# Patient Record
Sex: MALE | Race: WHITE | NOT HISPANIC OR LATINO | Employment: OTHER | URBAN - METROPOLITAN AREA
[De-identification: names, ages, dates, MRNs, and addresses within clinical notes are randomized per-mention and may not be internally consistent; named-entity substitution may affect disease eponyms.]

---

## 2017-04-28 ENCOUNTER — TELEPHONE (OUTPATIENT)
Dept: FAMILY MEDICINE | Facility: CLINIC | Age: 67
End: 2017-04-28

## 2017-04-28 NOTE — TELEPHONE ENCOUNTER
Left message that provider will be out of the clinic on day of appointment and will need to call back to reschedule appointment.

## 2017-05-19 DIAGNOSIS — E11.9 TYPE 2 DIABETES, HBA1C GOAL < 7%: Primary | ICD-10-CM

## 2017-05-22 ENCOUNTER — DOCUMENTATION ONLY (OUTPATIENT)
Dept: FAMILY MEDICINE | Facility: CLINIC | Age: 67
End: 2017-05-22

## 2017-05-22 NOTE — PROGRESS NOTES
Pre-Visit Chart Review  For Appointment Scheduled on 5/23/17.    Health Maintenance Due   Topic Date Due    TETANUS VACCINE  11/16/1966    Colonoscopy  11/16/1998    Zoster Vaccine  11/16/2008    Pneumococcal (65+) (2 of 2 - PCV13) 12/03/2014    Hemoglobin A1c  08/11/2015

## 2017-05-26 ENCOUNTER — DOCUMENTATION ONLY (OUTPATIENT)
Dept: FAMILY MEDICINE | Facility: CLINIC | Age: 67
End: 2017-05-26

## 2017-05-26 NOTE — PROGRESS NOTES
Pre-Visit Chart Review  For Appointment Scheduled on 5/31/17.    Health Maintenance Due   Topic Date Due    TETANUS VACCINE  11/16/1966    Colonoscopy  11/16/1998    Zoster Vaccine  11/16/2008    Pneumococcal (65+) (2 of 2 - PCV13) 12/03/2014    Hemoglobin A1c  08/11/2015

## 2017-05-31 ENCOUNTER — TELEPHONE (OUTPATIENT)
Dept: FAMILY MEDICINE | Facility: CLINIC | Age: 67
End: 2017-05-31

## 2017-05-31 ENCOUNTER — LAB VISIT (OUTPATIENT)
Dept: LAB | Facility: HOSPITAL | Age: 67
End: 2017-05-31
Attending: FAMILY MEDICINE
Payer: MEDICARE

## 2017-05-31 ENCOUNTER — OFFICE VISIT (OUTPATIENT)
Dept: FAMILY MEDICINE | Facility: CLINIC | Age: 67
End: 2017-05-31
Payer: MEDICARE

## 2017-05-31 VITALS
BODY MASS INDEX: 34.51 KG/M2 | SYSTOLIC BLOOD PRESSURE: 178 MMHG | TEMPERATURE: 98 F | RESPIRATION RATE: 18 BRPM | DIASTOLIC BLOOD PRESSURE: 70 MMHG | HEIGHT: 69 IN | WEIGHT: 233 LBS | HEART RATE: 60 BPM

## 2017-05-31 DIAGNOSIS — I10 HTN (HYPERTENSION), BENIGN: ICD-10-CM

## 2017-05-31 DIAGNOSIS — Z86.718 HISTORY OF DVT (DEEP VEIN THROMBOSIS): ICD-10-CM

## 2017-05-31 DIAGNOSIS — E11.9 TYPE 2 DIABETES MELLITUS WITHOUT COMPLICATION, UNSPECIFIED LONG TERM INSULIN USE STATUS: ICD-10-CM

## 2017-05-31 DIAGNOSIS — M65.30 TRIGGER FINGER, UNSPECIFIED FINGER, UNSPECIFIED LATERALITY: ICD-10-CM

## 2017-05-31 DIAGNOSIS — H61.22 LEFT EAR IMPACTED CERUMEN: ICD-10-CM

## 2017-05-31 DIAGNOSIS — I25.10 CORONARY ARTERY DISEASE INVOLVING NATIVE CORONARY ARTERY OF NATIVE HEART WITHOUT ANGINA PECTORIS: ICD-10-CM

## 2017-05-31 DIAGNOSIS — Z11.4 ENCOUNTER FOR SCREENING FOR HIV: ICD-10-CM

## 2017-05-31 DIAGNOSIS — I10 HTN (HYPERTENSION), BENIGN: Primary | ICD-10-CM

## 2017-05-31 DIAGNOSIS — E78.5 HYPERLIPIDEMIA WITH TARGET LOW DENSITY LIPOPROTEIN (LDL) CHOLESTEROL LESS THAN 100 MG/DL: ICD-10-CM

## 2017-05-31 DIAGNOSIS — Z29.9 PREVENTIVE MEASURE: ICD-10-CM

## 2017-05-31 DIAGNOSIS — Z12.11 COLON CANCER SCREENING: ICD-10-CM

## 2017-05-31 DIAGNOSIS — E11.9 TYPE 2 DIABETES, HBA1C GOAL < 7%: ICD-10-CM

## 2017-05-31 DIAGNOSIS — Z23 IMMUNIZATION DUE: ICD-10-CM

## 2017-05-31 DIAGNOSIS — L57.0 ACTINIC KERATOSIS: ICD-10-CM

## 2017-05-31 LAB
25(OH)D3+25(OH)D2 SERPL-MCNC: 69 NG/ML
ALBUMIN SERPL BCP-MCNC: 3.8 G/DL
ALP SERPL-CCNC: 59 U/L
ALT SERPL W/O P-5'-P-CCNC: 32 U/L
ANION GAP SERPL CALC-SCNC: 8 MMOL/L
AST SERPL-CCNC: 24 U/L
BASOPHILS # BLD AUTO: 0.03 K/UL
BASOPHILS NFR BLD: 0.5 %
BILIRUB SERPL-MCNC: 0.4 MG/DL
BUN SERPL-MCNC: 24 MG/DL
CALCIUM SERPL-MCNC: 9.5 MG/DL
CHLORIDE SERPL-SCNC: 106 MMOL/L
CHOLEST/HDLC SERPL: 5.5 {RATIO}
CO2 SERPL-SCNC: 26 MMOL/L
CREAT SERPL-MCNC: 1.5 MG/DL
DIFFERENTIAL METHOD: ABNORMAL
EOSINOPHIL # BLD AUTO: 0.1 K/UL
EOSINOPHIL NFR BLD: 2.2 %
ERYTHROCYTE [DISTWIDTH] IN BLOOD BY AUTOMATED COUNT: 12.8 %
EST. GFR  (AFRICAN AMERICAN): 54.5 ML/MIN/1.73 M^2
EST. GFR  (NON AFRICAN AMERICAN): 47.2 ML/MIN/1.73 M^2
GLUCOSE SERPL-MCNC: 183 MG/DL
HCT VFR BLD AUTO: 36.6 %
HDL/CHOLESTEROL RATIO: 18.1 %
HDLC SERPL-MCNC: 166 MG/DL
HDLC SERPL-MCNC: 30 MG/DL
HGB BLD-MCNC: 12.4 G/DL
LDLC SERPL CALC-MCNC: 101.8 MG/DL
LYMPHOCYTES # BLD AUTO: 1.5 K/UL
LYMPHOCYTES NFR BLD: 23.4 %
MCH RBC QN AUTO: 31.2 PG
MCHC RBC AUTO-ENTMCNC: 33.9 %
MCV RBC AUTO: 92 FL
MONOCYTES # BLD AUTO: 0.4 K/UL
MONOCYTES NFR BLD: 6 %
NEUTROPHILS # BLD AUTO: 4.3 K/UL
NEUTROPHILS NFR BLD: 67.6 %
NONHDLC SERPL-MCNC: 136 MG/DL
PLATELET # BLD AUTO: 172 K/UL
PMV BLD AUTO: 10.2 FL
POTASSIUM SERPL-SCNC: 4.6 MMOL/L
PROT SERPL-MCNC: 7.1 G/DL
RBC # BLD AUTO: 3.97 M/UL
SODIUM SERPL-SCNC: 140 MMOL/L
TRIGL SERPL-MCNC: 171 MG/DL
WBC # BLD AUTO: 6.36 K/UL

## 2017-05-31 PROCEDURE — 90670 PCV13 VACCINE IM: CPT | Mod: S$GLB,,, | Performed by: FAMILY MEDICINE

## 2017-05-31 PROCEDURE — 1159F MED LIST DOCD IN RCRD: CPT | Mod: S$GLB,,, | Performed by: FAMILY MEDICINE

## 2017-05-31 PROCEDURE — 99214 OFFICE O/P EST MOD 30 MIN: CPT | Mod: 25,S$GLB,, | Performed by: FAMILY MEDICINE

## 2017-05-31 PROCEDURE — G0009 ADMIN PNEUMOCOCCAL VACCINE: HCPCS | Mod: S$GLB,,, | Performed by: FAMILY MEDICINE

## 2017-05-31 PROCEDURE — 80061 LIPID PANEL: CPT

## 2017-05-31 PROCEDURE — 82306 VITAMIN D 25 HYDROXY: CPT

## 2017-05-31 PROCEDURE — 85025 COMPLETE CBC W/AUTO DIFF WBC: CPT

## 2017-05-31 PROCEDURE — 86592 SYPHILIS TEST NON-TREP QUAL: CPT

## 2017-05-31 PROCEDURE — 93005 ELECTROCARDIOGRAM TRACING: CPT | Mod: S$GLB,,, | Performed by: FAMILY MEDICINE

## 2017-05-31 PROCEDURE — 1126F AMNT PAIN NOTED NONE PRSNT: CPT | Mod: S$GLB,,, | Performed by: FAMILY MEDICINE

## 2017-05-31 PROCEDURE — 69209 REMOVE IMPACTED EAR WAX UNI: CPT | Mod: LT,S$GLB,, | Performed by: FAMILY MEDICINE

## 2017-05-31 PROCEDURE — 93010 ELECTROCARDIOGRAM REPORT: CPT | Mod: S$GLB,,, | Performed by: INTERNAL MEDICINE

## 2017-05-31 PROCEDURE — 36415 COLL VENOUS BLD VENIPUNCTURE: CPT | Mod: PO

## 2017-05-31 PROCEDURE — 4010F ACE/ARB THERAPY RXD/TAKEN: CPT | Mod: S$GLB,,, | Performed by: FAMILY MEDICINE

## 2017-05-31 PROCEDURE — 86703 HIV-1/HIV-2 1 RESULT ANTBDY: CPT

## 2017-05-31 PROCEDURE — 83036 HEMOGLOBIN GLYCOSYLATED A1C: CPT

## 2017-05-31 PROCEDURE — 80053 COMPREHEN METABOLIC PANEL: CPT

## 2017-05-31 PROCEDURE — 99999 PR PBB SHADOW E&M-EST. PATIENT-LVL III: CPT | Mod: PBBFAC,,, | Performed by: FAMILY MEDICINE

## 2017-05-31 RX ORDER — GLIPIZIDE 10 MG/1
10 TABLET ORAL 2 TIMES DAILY
Qty: 60 TABLET | Refills: 6 | Status: SHIPPED | OUTPATIENT
Start: 2017-05-31 | End: 2017-11-29 | Stop reason: SDUPTHER

## 2017-05-31 RX ORDER — PRAVASTATIN SODIUM 80 MG/1
80 TABLET ORAL DAILY
COMMUNITY
End: 2017-07-20 | Stop reason: SDUPTHER

## 2017-05-31 RX ORDER — GLIPIZIDE 10 MG/1
10 TABLET ORAL 2 TIMES DAILY
COMMUNITY
End: 2017-05-31 | Stop reason: SDUPTHER

## 2017-05-31 NOTE — PROGRESS NOTES
"[facility]  Progress Note    Subjective:       Patient ID: Amadeo Levin is a 66 y.o. male.    Chief Complaint: Establish Care and Annual Exam    HPI66 y.o.male     Mr Levin is a 65yo male with a PMH coronary artery disease, hypertension, and diabetes mellitus type 2 who presents today for establishment of care. All patients medical conditions have been under control with current medications. Patient complaining of his finger in left hand being stuck. Patient constantly uses finger to press down weed doreen. Finger will pop in and out with movement.  No further complaints at today's visit.     Review of Systems   Constitutional: Negative for activity change, appetite change, chills, fatigue and fever.   HENT: Negative for congestion, ear pain, postnasal drip, rhinorrhea, sinus pressure, sneezing and sore throat.    Eyes: Positive for redness. Negative for pain.   Respiratory: Negative for cough, chest tightness, shortness of breath and wheezing.    Cardiovascular: Negative for chest pain, palpitations and leg swelling.   Gastrointestinal: Negative for abdominal distention, abdominal pain, constipation, diarrhea, nausea and vomiting.   Genitourinary: Negative for difficulty urinating, dysuria, frequency and urgency.   Musculoskeletal: Negative for arthralgias, back pain, joint swelling and myalgias.   Skin: Negative for rash.   Neurological: Negative for dizziness, seizures, syncope, weakness and numbness.   Psychiatric/Behavioral: Negative for sleep disturbance. The patient is not nervous/anxious.        Objective:      Vitals:    05/31/17 0947 05/31/17 1035   BP: (!) 176/71 (!) 178/70   BP Location: Right arm Right arm   Patient Position: Sitting Sitting   BP Method: Automatic Manual   Pulse: 60    Resp: 18    Temp: 98.3 °F (36.8 °C)    TempSrc: Oral    Weight: 105.7 kg (233 lb 0.4 oz)    Height: 5' 9" (1.753 m)      Body mass index is 34.41 kg/m².  Physical Exam   Constitutional: He is oriented to " person, place, and time. He appears well-developed and well-nourished. No distress.   HENT:   Head: Normocephalic and atraumatic.   Left ear cerumen impaction   Eyes: Conjunctivae and EOM are normal. Pupils are equal, round, and reactive to light. No scleral icterus.   Neck: Normal range of motion. Neck supple. No JVD present.   Cardiovascular: Normal rate, regular rhythm, normal heart sounds and intact distal pulses.  Exam reveals no gallop and no friction rub.    No murmur heard.  Pulmonary/Chest: Effort normal and breath sounds normal. No respiratory distress. He has no wheezes.   Abdominal: Soft. Bowel sounds are normal. There is no tenderness. There is no guarding.   Musculoskeletal: Normal range of motion. He exhibits no tenderness.   Lymphadenopathy:     He has no cervical adenopathy.   Neurological: He is alert and oriented to person, place, and time. No cranial nerve deficit.   Skin: Skin is warm and dry. He is not diaphoretic.   Psychiatric: He has a normal mood and affect. His behavior is normal. Judgment and thought content normal.   Nursing note and vitals reviewed.      Assessment:       1. HTN (hypertension), benign    2. Coronary artery disease involving native coronary artery of native heart without angina pectoris    3. Type 2 diabetes mellitus without complication, unspecified long term insulin use status    4. Hyperlipidemia with target low density lipoprotein (LDL) cholesterol less than 100 mg/dL    5. BMI 34.0-34.9,adult    6. History of DVT (deep vein thrombosis)    7. Encounter for screening for HIV    8. Preventive measure    9. Immunization due    10. Colon cancer screening    11. Actinic keratosis    12. Trigger finger, unspecified finger, unspecified laterality    13. Left ear impacted cerumen        Plan:       HTN (hypertension), benign  -     Comprehensive metabolic panel; Future; Expected date: 05/31/2017  -     IN OFFICE EKG 12-LEAD (to [place])    Coronary artery disease involving  native coronary artery of native heart without angina pectoris  -     CBC auto differential; Future; Expected date: 05/31/2017    Type 2 diabetes mellitus without complication, unspecified long term insulin use status  -     Hemoglobin A1c; Future; Expected date: 05/31/2017  -     Microalbumin/creatinine urine ratio; Future; Expected date: 05/31/2017    Hyperlipidemia with target low density lipoprotein (LDL) cholesterol less than 100 mg/dL  -     Lipid panel; Future; Expected date: 05/31/2017    BMI 34.0-34.9,adult  -     Vitamin D; Future; Expected date: 05/31/2017    History of DVT (deep vein thrombosis)        -     EliQUIS 2.5 mg BID    Encounter for screening for HIV  -     HIV-1 and HIV-2 antibodies; Future; Expected date: 05/31/2017    Preventive measure  -     RPR; Future; Expected date: 05/31/2017    Trigger finger        - Ambulatory referral to      Left ear cerumen impaction         - Water irrigation with hydrogen peroxide was performed on left ear followed up removal of ear wax manually with curette patient tolerated procedure well without any complications       Patient readiness: acceptance and barriers:none    During the course of the visit the patient was educated and counseled about the following:     Diabetes:  Discussed general issues about diabetes pathophysiology and management.  Continued metformin; see  medication orders.  Hypertension:   Medication: no change.  Dietary sodium restriction.  Regular aerobic exercise.    Goals: Diabetes: Maintain Hemoglobin A1C below 7 and Hypertension: Reduce Blood Pressure    Did patient meet goals/outcomes: Yes    The following self management tools provided: declined    Patient Instructions (the written plan) was given to the patient/family.     Time spent with patient: 45 minutes      Return in about 3 months (around 8/31/2017).  Gurinder Turner MD  [facility]  5/31/2017 10:17 AM

## 2017-05-31 NOTE — TELEPHONE ENCOUNTER
----- Message from Makenna Quintero sent at 5/31/2017  1:25 PM CDT -----  Contact: Amadeo Julien is returning call. Please call [phone]. Thanks!

## 2017-05-31 NOTE — TELEPHONE ENCOUNTER
Staff called to see about specimen card and Derm appointment; at check out patient said he received card and would call back to schedule Derm appointment. Talked with patient that information passed along.

## 2017-05-31 NOTE — TELEPHONE ENCOUNTER
Left message for patient to call back to schedule appointment and see if he received occult cards

## 2017-06-01 LAB
ESTIMATED AVG GLUCOSE: 220 MG/DL
ESTIMATED AVG GLUCOSE: 220 MG/DL
HBA1C MFR BLD HPLC: 9.3 %
HBA1C MFR BLD HPLC: 9.3 %
HIV 1+2 AB+HIV1 P24 AG SERPL QL IA: NEGATIVE
RPR SER QL: NORMAL

## 2017-06-07 ENCOUNTER — TELEPHONE (OUTPATIENT)
Dept: FAMILY MEDICINE | Facility: CLINIC | Age: 67
End: 2017-06-07

## 2017-06-07 NOTE — TELEPHONE ENCOUNTER
----- Message from Gurinder Turner MD sent at 6/5/2017  9:41 AM CDT -----  Please call and inform patient that his HA1C has increased to 9.3.  Kidney function remains stable with CRE at 1.5. Please have patient make appointment as soon as possible to discuss treatment options.

## 2017-06-15 ENCOUNTER — DOCUMENTATION ONLY (OUTPATIENT)
Dept: FAMILY MEDICINE | Facility: CLINIC | Age: 67
End: 2017-06-15

## 2017-06-15 NOTE — PROGRESS NOTES
Pre-Visit Chart Review  For Appointment Scheduled on 06/16/2017    Health Maintenance Due   Topic Date Due    Foot Exam  11/16/1958    TETANUS VACCINE  11/16/1966    Colonoscopy  11/16/1998    Zoster Vaccine  11/16/2008    Eye Exam  06/16/2016

## 2017-06-16 ENCOUNTER — DOCUMENTATION ONLY (OUTPATIENT)
Dept: FAMILY MEDICINE | Facility: CLINIC | Age: 67
End: 2017-06-16

## 2017-06-16 ENCOUNTER — OFFICE VISIT (OUTPATIENT)
Dept: FAMILY MEDICINE | Facility: CLINIC | Age: 67
End: 2017-06-16
Payer: MEDICARE

## 2017-06-16 VITALS
BODY MASS INDEX: 33.96 KG/M2 | HEART RATE: 79 BPM | WEIGHT: 229.25 LBS | SYSTOLIC BLOOD PRESSURE: 152 MMHG | TEMPERATURE: 99 F | DIASTOLIC BLOOD PRESSURE: 68 MMHG | HEIGHT: 69 IN

## 2017-06-16 DIAGNOSIS — E11.9 TYPE 2 DIABETES MELLITUS WITHOUT COMPLICATION, UNSPECIFIED LONG TERM INSULIN USE STATUS: ICD-10-CM

## 2017-06-16 DIAGNOSIS — I10 HTN (HYPERTENSION), BENIGN: Primary | ICD-10-CM

## 2017-06-16 PROCEDURE — 4010F ACE/ARB THERAPY RXD/TAKEN: CPT | Mod: S$GLB,,, | Performed by: PHYSICIAN ASSISTANT

## 2017-06-16 PROCEDURE — 1126F AMNT PAIN NOTED NONE PRSNT: CPT | Mod: S$GLB,,, | Performed by: PHYSICIAN ASSISTANT

## 2017-06-16 PROCEDURE — 99999 PR PBB SHADOW E&M-EST. PATIENT-LVL IV: CPT | Mod: PBBFAC,,, | Performed by: PHYSICIAN ASSISTANT

## 2017-06-16 PROCEDURE — 99213 OFFICE O/P EST LOW 20 MIN: CPT | Mod: S$GLB,,, | Performed by: PHYSICIAN ASSISTANT

## 2017-06-16 PROCEDURE — 1159F MED LIST DOCD IN RCRD: CPT | Mod: S$GLB,,, | Performed by: PHYSICIAN ASSISTANT

## 2017-06-16 PROCEDURE — 3046F HEMOGLOBIN A1C LEVEL >9.0%: CPT | Mod: S$GLB,,, | Performed by: PHYSICIAN ASSISTANT

## 2017-06-16 NOTE — PROGRESS NOTES
Pre-Visit Chart Review  For Appointment Scheduled on 6/21/17.    Health Maintenance Due   Topic Date Due    Foot Exam  11/16/1958    TETANUS VACCINE  11/16/1966    Colonoscopy  11/16/1998    Zoster Vaccine  11/16/2008    Eye Exam  06/16/2016

## 2017-06-16 NOTE — PATIENT INSTRUCTIONS
Weight Management: Getting Started  Healthy bodies come in all shapes and sizes. Not all bodies are made to be thin. For some people, a healthy weight is higher than the average weight listed on weight charts. Your healthcare provider can help you decide on a healthy weight for you.    Reasons to lose weight  Losing weight can help with some health problems, such as high blood pressure, heart disease, diabetes, sleep apnea, and arthritis. You may also feel more energy.  Set your long-term goal  Your goal doesn't even have to be a specific weight. You may decide on a fitness goal (such as being able to walk 10 miles a week), or a health goal (such as lowering your blood pressure). Choose a goal that is measurable and reasonable, so you know when you've reached it. A goal of reaching a BMI of less than 25 is not always reasonable (or possible).   Make an action plan  Habits dont change overnight. Setting your goals too high can leave you feeling discouraged if you cant reach them. Be realistic. Choose one or two small changes you can make now. Set an action plan for how you are going to make these changes. When you can stick to this plan, keep making a few more small changes. Taking small steps will help you stay on the path to success.  Track your progress  Write down your goals. Then, keep a daily record of your progress. Write down what you eat and how active you are. This record lets you look back on how much youve done. It may also help when youre feeling frustrated. Reward yourself for success. Even if you dont reach every goal, give yourself credit for what you do get done.  Get support  Encouragement from others can help make losing weight easier. Ask your family members and friends for support. They may even want to join you. Also look to your healthcare provider, registered dietitian, and  for help. Your local hospital can give you more information about nutrition, exercise, and  weight loss.  Date Last Reviewed: 1/31/2016 © 2000-2016 [employer]. [address] [place] [address], [place] [address]. All rights reserved. This information is not intended as a substitute for professional medical care. Always follow your healthcare professional's instructions.        Walking for Fitness  Fitness walking has something for everyone, even people who are already fit. Walking is one of the safest ways to condition your body aerobically. It can boost energy, help you lose weight, and reduce stress.    Physical benefits  · Walking strengthens your heart and lungs, and tones your muscles.  · When walking, your feet land with less impact than in other sports. This reduces chances of muscle, bone, and joint injury.  · Regular walking improves your cholesterol levels and lowers your risk of heart disease. And it helps you control your blood sugar if you have diabetes.  · Walking is a weight-bearing activity, which helps maintain bone density. This can help prevent osteoporosis.  Personal rewards  · Taking walks can help you relax and manage stress. And fitness walking may make you feel better about yourself.  · Walking can help you sleep better at night and make you less likely to be depressed.  · Regular walking may help maintain your memory as you get older.  · Walking is a great way to spend extra time with friends and family members. Be sure to invite your dog along!  Q&A about fitness walking  Q: Will walking keep me fit?  A: Yes. Regular walking at the right pace gives you all the benefits of other aerobic activities, such as jogging and swimming.  Q: Will walking help me lose weight and keep it off?  A: Yes. Per mile, walking can burn as many calories as jogging. Your health care provider can help work walking into your weight-loss plan.  Q: Is walking safe for my health?  A: Yes. Walking is safe if you have high blood pressure, diabetes, heart disease, or other conditions. Talk to your health  care provider before you start.  Date Last Reviewed: 5/9/2015  © 4779-8952 [employer]. [address] [place] [address], [place] [address]. All rights reserved. This information is not intended as a substitute for professional medical care. Always follow your healthcare professional's instructions.

## 2017-06-16 NOTE — PROGRESS NOTES
Subjective:       Patient ID: Amadeo Levin is a 66 y.o. male.    Chief Complaint: Results (labs - 05/31)    Patient presents for follow up of recent lab indicating poor control of diabetes with A1C of 9.3.  He admits to non compliance with diet eating candy and sweets. He does not check his glucose often.  He is checking his BP and bring in monitor.  Reading are at goal of less than 140/80.  He has some diastolic readings in the 40s.  He has no complaints.  Due for foot exam.  Just had eye exam       Review of Systems   Constitutional: Negative for appetite change, fatigue and unexpected weight change.   Eyes: Negative for visual disturbance.   Respiratory: Negative for shortness of breath.    Cardiovascular: Positive for leg swelling. Negative for chest pain and palpitations.   Endocrine: Negative for polydipsia and polyuria.   Neurological: Negative for weakness and numbness.       Objective:      Physical Exam   Constitutional: He appears well-developed and well-nourished. He is cooperative. No distress.   HENT:   Head: Normocephalic and atraumatic.   Eyes: Conjunctivae and EOM are normal. No scleral icterus.   Neck: Carotid bruit is not present.   Cardiovascular: Normal rate, regular rhythm and normal heart sounds.    Pulses:       Dorsalis pedis pulses are 2+ on the right side, and 2+ on the left side.   Pulmonary/Chest: Effort normal and breath sounds normal.   Abdominal: Soft. Bowel sounds are normal. There is no tenderness.   Musculoskeletal:        Right lower leg: He exhibits edema (trace).        Left lower leg: He exhibits edema (1 + ).   Hyperpigmentation to left lower extremity      Feet:   Right Foot:   Protective Sensation: 5 sites tested. 5 sites sensed.   Skin Integrity: Negative for skin breakdown.   Left Foot:   Protective Sensation: 5 sites tested. 5 sites sensed.   Skin Integrity: Negative for skin breakdown.   Neurological: He is alert.   Vitals reviewed.      Assessment:       1. HTN  (hypertension), benign    2. Type 2 diabetes mellitus without complication, unspecified long term insulin use status    3. BMI 33.0-33.9,adult        Plan:       Amadeo was seen today for results.    Diagnoses and all orders for this visit:    HTN (hypertension), benign    Type 2 diabetes mellitus without complication, unspecified long term insulin use status    BMI 33.0-33.9,adult    Other orders  -     SITagliptin (JANUVIA) 50 MG Tab; Take 1 tablet (50 mg total) by mouth once daily.    Patient readiness: acceptance and barriers:readiness    During the course of the visit the patient was educated and counseled about the following:     Diabetes:  Labs: hemoglobin A1C.  Reminded to bring in blood sugar diary at next visit.  continue metformin and glucotrol add januvia  Hypertension:   Check blood pressures daily and record.  Obesity:   Diet interventions: diet diary indefinitely.    Goals: Diabetes: Maintain Hemoglobin A1C below 7, Hypertension: Reduce Blood Pressure and Obesity: Reduce calorie intake and BMI    Did patient meet goals/outcomes: No    The following self management tools provided: blood pressure log  blood glucose log  excercise log    Patient Instructions (the written plan) was given to the patient/family.     Time spent with patient: 30 minutes

## 2017-06-22 DIAGNOSIS — E11.9 DIABETES MELLITUS WITHOUT COMPLICATION: ICD-10-CM

## 2017-06-22 RX ORDER — METFORMIN HYDROCHLORIDE 1000 MG/1
1000 TABLET ORAL 2 TIMES DAILY WITH MEALS
Qty: 180 TABLET | Refills: 1 | Status: SHIPPED | OUTPATIENT
Start: 2017-06-22 | End: 2018-01-08 | Stop reason: SDUPTHER

## 2017-07-13 DIAGNOSIS — E11.9 DIABETES MELLITUS WITHOUT COMPLICATION: ICD-10-CM

## 2017-07-20 NOTE — TELEPHONE ENCOUNTER
----- Message from Virgen Fiore sent at 7/20/2017 11:03 AM CDT -----  Contact: patient  Patient calling in regards to requesting a new prescription for Pravastatin. He is out of refills. Please advise.  Call back   Thanks!  [facility] - [place], [place] - [address]  [address] [facility] [address] [address]  Phone: [phone] Fax: [phone]

## 2017-07-21 RX ORDER — PRAVASTATIN SODIUM 80 MG/1
80 TABLET ORAL DAILY
Qty: 90 TABLET | Refills: 3 | Status: SHIPPED | OUTPATIENT
Start: 2017-07-21 | End: 2018-11-26 | Stop reason: SDUPTHER

## 2017-09-18 ENCOUNTER — TELEPHONE (OUTPATIENT)
Dept: FAMILY MEDICINE | Facility: CLINIC | Age: 67
End: 2017-09-18

## 2017-10-10 DIAGNOSIS — Z12.11 SCREENING FOR COLON CANCER: Primary | ICD-10-CM

## 2017-11-06 ENCOUNTER — TELEPHONE (OUTPATIENT)
Dept: FAMILY MEDICINE | Facility: CLINIC | Age: 67
End: 2017-11-06

## 2017-11-06 NOTE — TELEPHONE ENCOUNTER
----- Message from Sapphire Rodriguez sent at 11/6/2017  9:21 AM CST -----  Contact: self  Pt calling to schedule 3 month labs on 11/27 before appt. Thanks    [phone]

## 2017-11-21 ENCOUNTER — TELEPHONE (OUTPATIENT)
Dept: FAMILY MEDICINE | Facility: CLINIC | Age: 67
End: 2017-11-21

## 2017-11-29 DIAGNOSIS — E11.9 TYPE 2 DIABETES MELLITUS WITHOUT COMPLICATION, UNSPECIFIED LONG TERM INSULIN USE STATUS: ICD-10-CM

## 2017-11-29 RX ORDER — GLIPIZIDE 10 MG/1
10 TABLET ORAL 2 TIMES DAILY
Qty: 180 TABLET | Refills: 3 | Status: SHIPPED | OUTPATIENT
Start: 2017-11-29 | End: 2018-01-24 | Stop reason: SDUPTHER

## 2017-11-29 NOTE — TELEPHONE ENCOUNTER
----- Message from Makenna Hendrickson sent at 11/29/2017 11:01 AM CST -----  Contact: self  Patient [phone] (please call patient on this phone number) has his 3 month followup appt with Dr Turner on Thursday 01 04 18 at 9:20am/He had an appt with Dr Turner on Monday 11 27 17 but the doctor booked out/patient is needing a 3 month refill on his Glipizide 10mg tablets - takes one tablet twice daily - #180 as he is almost out/please send to pharmacy and advise patient when sent    [facility] - [place], [place] - [address]  [address] [facility] [address]  Phone: [phone] Fax: [phone]

## 2017-12-07 DIAGNOSIS — Z13.5 DIABETIC RETINOPATHY SCREENING: ICD-10-CM

## 2017-12-29 ENCOUNTER — DOCUMENTATION ONLY (OUTPATIENT)
Dept: FAMILY MEDICINE | Facility: CLINIC | Age: 67
End: 2017-12-29

## 2017-12-29 NOTE — PROGRESS NOTES
Pre-Visit Chart Review  For Appointment Scheduled on 1/4/17.    Health Maintenance Due   Topic Date Due    TETANUS VACCINE  11/16/1966    Colonoscopy  11/16/1998    Zoster Vaccine  11/16/2008    Eye Exam  06/16/2016    Influenza Vaccine  08/01/2017    Hemoglobin A1c  08/31/2017

## 2018-01-04 ENCOUNTER — LAB VISIT (OUTPATIENT)
Dept: LAB | Facility: HOSPITAL | Age: 68
End: 2018-01-04
Attending: FAMILY MEDICINE
Payer: MEDICARE

## 2018-01-04 ENCOUNTER — OFFICE VISIT (OUTPATIENT)
Dept: FAMILY MEDICINE | Facility: CLINIC | Age: 68
End: 2018-01-04
Payer: MEDICARE

## 2018-01-04 VITALS
RESPIRATION RATE: 20 BRPM | TEMPERATURE: 98 F | HEIGHT: 69 IN | BODY MASS INDEX: 34.78 KG/M2 | WEIGHT: 234.81 LBS | SYSTOLIC BLOOD PRESSURE: 134 MMHG | DIASTOLIC BLOOD PRESSURE: 75 MMHG | HEART RATE: 69 BPM

## 2018-01-04 DIAGNOSIS — E66.9 OBESITY (BMI 30.0-34.9): ICD-10-CM

## 2018-01-04 DIAGNOSIS — L57.0 ACTINIC KERATOSIS: ICD-10-CM

## 2018-01-04 DIAGNOSIS — E11.9 TYPE 2 DIABETES MELLITUS WITHOUT COMPLICATION, UNSPECIFIED LONG TERM INSULIN USE STATUS: ICD-10-CM

## 2018-01-04 DIAGNOSIS — I10 HTN (HYPERTENSION), BENIGN: ICD-10-CM

## 2018-01-04 DIAGNOSIS — I10 HTN (HYPERTENSION), BENIGN: Primary | ICD-10-CM

## 2018-01-04 DIAGNOSIS — E78.5 HYPERLIPIDEMIA WITH TARGET LOW DENSITY LIPOPROTEIN (LDL) CHOLESTEROL LESS THAN 100 MG/DL: ICD-10-CM

## 2018-01-04 LAB
25(OH)D3+25(OH)D2 SERPL-MCNC: 69 NG/ML
ALBUMIN SERPL BCP-MCNC: 3.8 G/DL
ALP SERPL-CCNC: 61 U/L
ALT SERPL W/O P-5'-P-CCNC: 33 U/L
ANION GAP SERPL CALC-SCNC: 9 MMOL/L
AST SERPL-CCNC: 23 U/L
BASOPHILS # BLD AUTO: 0.09 K/UL
BASOPHILS NFR BLD: 1.2 %
BILIRUB SERPL-MCNC: 0.5 MG/DL
BUN SERPL-MCNC: 27 MG/DL
CALCIUM SERPL-MCNC: 9.6 MG/DL
CHLORIDE SERPL-SCNC: 106 MMOL/L
CHOLEST SERPL-MCNC: 231 MG/DL
CHOLEST/HDLC SERPL: 6.1 {RATIO}
CO2 SERPL-SCNC: 25 MMOL/L
CREAT SERPL-MCNC: 1.6 MG/DL
DIFFERENTIAL METHOD: ABNORMAL
EOSINOPHIL # BLD AUTO: 0.2 K/UL
EOSINOPHIL NFR BLD: 3.2 %
ERYTHROCYTE [DISTWIDTH] IN BLOOD BY AUTOMATED COUNT: 12.9 %
EST. GFR  (AFRICAN AMERICAN): 50.1 ML/MIN/1.73 M^2
EST. GFR  (NON AFRICAN AMERICAN): 43.3 ML/MIN/1.73 M^2
ESTIMATED AVG GLUCOSE: 212 MG/DL
GLUCOSE SERPL-MCNC: 228 MG/DL
HBA1C MFR BLD HPLC: 9 %
HCT VFR BLD AUTO: 37.2 %
HDLC SERPL-MCNC: 38 MG/DL
HDLC SERPL: 16.5 %
HGB BLD-MCNC: 12.4 G/DL
IMM GRANULOCYTES # BLD AUTO: 0.02 K/UL
IMM GRANULOCYTES NFR BLD AUTO: 0.3 %
LDLC SERPL CALC-MCNC: 146.8 MG/DL
LYMPHOCYTES # BLD AUTO: 1.8 K/UL
LYMPHOCYTES NFR BLD: 24.9 %
MCH RBC QN AUTO: 30.7 PG
MCHC RBC AUTO-ENTMCNC: 33.3 G/DL
MCV RBC AUTO: 92 FL
MONOCYTES # BLD AUTO: 0.5 K/UL
MONOCYTES NFR BLD: 7.1 %
NEUTROPHILS # BLD AUTO: 4.6 K/UL
NEUTROPHILS NFR BLD: 63.3 %
NONHDLC SERPL-MCNC: 193 MG/DL
NRBC BLD-RTO: 0 /100 WBC
PLATELET # BLD AUTO: 193 K/UL
PMV BLD AUTO: 10.8 FL
POTASSIUM SERPL-SCNC: 4.4 MMOL/L
PROT SERPL-MCNC: 7.3 G/DL
RBC # BLD AUTO: 4.04 M/UL
SODIUM SERPL-SCNC: 140 MMOL/L
TRIGL SERPL-MCNC: 231 MG/DL
TSH SERPL DL<=0.005 MIU/L-ACNC: 1.09 UIU/ML
WBC # BLD AUTO: 7.23 K/UL

## 2018-01-04 PROCEDURE — 83036 HEMOGLOBIN GLYCOSYLATED A1C: CPT

## 2018-01-04 PROCEDURE — 82306 VITAMIN D 25 HYDROXY: CPT

## 2018-01-04 PROCEDURE — 84443 ASSAY THYROID STIM HORMONE: CPT

## 2018-01-04 PROCEDURE — 36415 COLL VENOUS BLD VENIPUNCTURE: CPT | Mod: PO

## 2018-01-04 PROCEDURE — 85025 COMPLETE CBC W/AUTO DIFF WBC: CPT

## 2018-01-04 PROCEDURE — 80061 LIPID PANEL: CPT

## 2018-01-04 PROCEDURE — 99214 OFFICE O/P EST MOD 30 MIN: CPT | Mod: S$GLB,,, | Performed by: FAMILY MEDICINE

## 2018-01-04 PROCEDURE — 80053 COMPREHEN METABOLIC PANEL: CPT

## 2018-01-04 PROCEDURE — 99999 PR PBB SHADOW E&M-EST. PATIENT-LVL IV: CPT | Mod: PBBFAC,,, | Performed by: FAMILY MEDICINE

## 2018-01-04 NOTE — PROGRESS NOTES
"[facility]  Progress Note    Subjective:       Patient ID: Amadeo Levin is a 67 y.o. male.    Chief Complaint: DM follow up     HPI67 y.o.male is here today for diabetic follow-up visit.  Patient discontinued using Januvia secondary to cost.  Patient is due for annual labs today.  Patient's blood pressure is elevated at today's visit.  Patient says his blood pressures at home have been at goal below 140/80.  All other medical issues currently stable.  Review of Systems   Constitutional: Negative for chills and fever.   HENT: Negative for congestion.    Eyes: Negative for pain.   Respiratory: Negative for shortness of breath and wheezing.    Cardiovascular: Negative for chest pain, palpitations and leg swelling.   Gastrointestinal: Negative for abdominal pain, nausea and vomiting.   Genitourinary: Negative for difficulty urinating.   Musculoskeletal: Negative for arthralgias, gait problem and myalgias.   Skin: Negative for rash.   Neurological: Negative for seizures.       Objective:      Vitals:    01/04/18 0926 01/04/18 0935 01/04/18 1142   BP: (!) 178/72 (!) 182/72 134/75   BP Location: Right arm Right arm    Patient Position: Sitting Sitting    BP Method: Medium (Automatic) Medium (Manual)    Pulse: 69     Resp: 20     Temp: 97.9 °F (36.6 °C)     TempSrc: Oral     Weight: 106.5 kg (234 lb 12.6 oz)     Height: 5' 9" (1.753 m)       Body mass index is 34.67 kg/m².  Physical Exam   Constitutional: He is oriented to person, place, and time. He appears well-developed and well-nourished. No distress.   HENT:   Head: Normocephalic and atraumatic.   Cardiovascular: Normal rate, regular rhythm, normal heart sounds and intact distal pulses.  Exam reveals no gallop and no friction rub.    No murmur heard.  Pulmonary/Chest: Effort normal and breath sounds normal. No respiratory distress. He has no rales.   Abdominal: Soft. He exhibits no distension and no mass. There is no rebound and no guarding. No hernia. "   Musculoskeletal: Normal range of motion.   Neurological: He is alert and oriented to person, place, and time.   Skin: Skin is warm.   Psychiatric: He has a normal mood and affect. His behavior is normal. Judgment and thought content normal.   Vitals reviewed.      Assessment:       1. HTN (hypertension), benign    2. Hyperlipidemia with target low density lipoprotein (LDL) cholesterol less than 100 mg/dL    3. Type 2 diabetes mellitus without complication, unspecified long term insulin use status    4. Obesity (BMI 30.0-34.9)    5. Body mass index (BMI) of 33.0-33.9 in adult     6. Actinic keratosis        Plan:       HTN (hypertension), benign  -     CBC auto differential; Future; Expected date: 01/04/2018    Hyperlipidemia with target low density lipoprotein (LDL) cholesterol less than 100 mg/dL  -     Lipid panel; Future; Expected date: 01/04/2018    Type 2 diabetes mellitus without complication, unspecified long term insulin use status  -     Comprehensive metabolic panel; Future; Expected date: 01/04/2018  -     Hemoglobin A1c; Future; Expected date: 01/04/2018  -     TSH; Future; Expected date: 01/04/2018    Obesity (BMI 30.0-34.9)  -     Vitamin D; Future; Expected date: 01/04/2018    Body mass index (BMI) of 33.0-33.9 in adult   -     Vitamin D; Future; Expected date: 01/04/2018    Actinic keratosis  -     Ambulatory referral to Dermatology    Patient readiness: acceptance and barriers:none    During the course of the visit the patient was educated and counseled about the following:     Diabetes:  Discussed general issues about diabetes pathophysiology and management.  Hypertension:   Dietary sodium restriction.  Regular aerobic exercise.  Check blood pressures daily and record.  Obesity:   General weight loss/lifestyle modification strategies discussed (elicit support from others; identify saboteurs; non-food rewards, etc).    Goals: Diabetes: Maintain Hemoglobin A1C below 7, Hypertension: Reduce Blood  Pressure and Obesity: Reduce calorie intake and BMI    Did patient meet goals/outcomes: No    The following self management tools provided: blood pressure log  blood glucose log  excercise log    Patient Instructions (the written plan) was given to the patient/family.     Time spent with patient: 30 minutes    Barriers to medications present (no )    Adverse reactions to current medications (no)    Over the counter medications reviewed (Yes)    Return in about 2 weeks (around 1/18/2018).  Gurinder Turner MD  [facility]  1/4/2018 9:31 AM

## 2018-01-08 DIAGNOSIS — E11.9 DIABETES MELLITUS WITHOUT COMPLICATION: ICD-10-CM

## 2018-01-08 RX ORDER — METFORMIN HYDROCHLORIDE 1000 MG/1
TABLET ORAL
Qty: 180 TABLET | Refills: 1 | Status: SHIPPED | OUTPATIENT
Start: 2018-01-08 | End: 2018-05-21 | Stop reason: SDUPTHER

## 2018-01-22 ENCOUNTER — DOCUMENTATION ONLY (OUTPATIENT)
Dept: FAMILY MEDICINE | Facility: CLINIC | Age: 68
End: 2018-01-22

## 2018-01-22 NOTE — PROGRESS NOTES
Pre-Visit Chart Review  For Appointment Scheduled on 01/24/2018    Health Maintenance Due   Topic Date Due    TETANUS VACCINE  11/16/1966    Colonoscopy  11/16/1998    Zoster Vaccine  11/16/2008    Eye Exam  06/16/2016    Influenza Vaccine  08/01/2017

## 2018-01-24 ENCOUNTER — OFFICE VISIT (OUTPATIENT)
Dept: FAMILY MEDICINE | Facility: CLINIC | Age: 68
End: 2018-01-24
Payer: MEDICARE

## 2018-01-24 ENCOUNTER — TELEPHONE (OUTPATIENT)
Dept: ENDOCRINOLOGY | Facility: CLINIC | Age: 68
End: 2018-01-24

## 2018-01-24 VITALS
BODY MASS INDEX: 34.61 KG/M2 | HEART RATE: 67 BPM | SYSTOLIC BLOOD PRESSURE: 144 MMHG | WEIGHT: 233.69 LBS | HEIGHT: 69 IN | TEMPERATURE: 98 F | DIASTOLIC BLOOD PRESSURE: 82 MMHG

## 2018-01-24 DIAGNOSIS — E78.5 HYPERLIPIDEMIA WITH TARGET LOW DENSITY LIPOPROTEIN (LDL) CHOLESTEROL LESS THAN 100 MG/DL: ICD-10-CM

## 2018-01-24 DIAGNOSIS — E11.59 HYPERTENSION ASSOCIATED WITH DIABETES: ICD-10-CM

## 2018-01-24 DIAGNOSIS — I15.2 HYPERTENSION ASSOCIATED WITH DIABETES: ICD-10-CM

## 2018-01-24 DIAGNOSIS — E66.9 OBESITY (BMI 30.0-34.9): ICD-10-CM

## 2018-01-24 DIAGNOSIS — I25.10 CORONARY ARTERY DISEASE INVOLVING NATIVE CORONARY ARTERY OF NATIVE HEART WITHOUT ANGINA PECTORIS: ICD-10-CM

## 2018-01-24 DIAGNOSIS — Z12.11 SCREEN FOR COLON CANCER: ICD-10-CM

## 2018-01-24 PROCEDURE — 99214 OFFICE O/P EST MOD 30 MIN: CPT | Mod: S$GLB,,, | Performed by: PHYSICIAN ASSISTANT

## 2018-01-24 PROCEDURE — 99999 PR PBB SHADOW E&M-EST. PATIENT-LVL IV: CPT | Mod: PBBFAC,,, | Performed by: PHYSICIAN ASSISTANT

## 2018-01-24 RX ORDER — GLIPIZIDE 10 MG/1
TABLET ORAL
Qty: 270 TABLET | Refills: 3
Start: 2018-01-24 | End: 2018-11-26 | Stop reason: SDUPTHER

## 2018-01-24 RX ORDER — GLIPIZIDE 10 MG/1
TABLET ORAL
Qty: 270 TABLET | Refills: 3 | Status: SHIPPED | OUTPATIENT
Start: 2018-01-24 | End: 2018-01-24 | Stop reason: SDUPTHER

## 2018-01-24 NOTE — TELEPHONE ENCOUNTER
----- Message from Penelope Mooney sent at 1/24/2018 11:23 AM CST -----  Pt needs an appt for Uncontrolled type 2 diabetes mellitus with stage 3 chronic kidney disease, without long-term current use of insulin  He is willing to see a N. P.  Please call him @ [phone], okay to leave message  Thanks !

## 2018-01-24 NOTE — PROGRESS NOTES
Subjective:       Patient ID: Amadeo Levin is a 67 y.o. male.    Chief Complaint: Follow-up    Patient with uncontrolled diabetes, hypertension,hyperlipidemia presents for follow up of recent lab indicating poor diabetic control.  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  He admits to diet non compliance.  He was on januvia in the past but he is no longer able to afford the medication.  He brought in BP log showing -140/50-60.        Review of Systems   Constitutional: Negative for activity change, appetite change, fatigue and unexpected weight change.   Respiratory: Negative for cough, chest tightness and shortness of breath.    Cardiovascular: Negative for chest pain, palpitations and leg swelling.   Gastrointestinal: Negative for abdominal pain, anal bleeding, blood in stool, constipation, diarrhea and nausea.   Endocrine: Negative for polydipsia and polyuria.   Genitourinary: Negative for difficulty urinating, frequency, hematuria and urgency.   Musculoskeletal: Negative for arthralgias.   Skin: Negative for rash.   Neurological: Negative for dizziness and headaches.   Psychiatric/Behavioral: Negative for dysphoric mood. The patient is not nervous/anxious.        Objective:      Physical Exam   Constitutional: He appears well-developed and well-nourished. He is cooperative. No distress.   HENT:   Head: Normocephalic and atraumatic.   Eyes: Conjunctivae and EOM are normal. No scleral icterus.   Neck: Carotid bruit is not present.   Cardiovascular: Normal rate, regular rhythm and normal heart sounds.    Pulmonary/Chest: Effort normal and breath sounds normal.   Abdominal: Soft. Bowel sounds are normal. There is no tenderness.   Musculoskeletal:        Right lower leg: He exhibits no edema.        Left lower leg: He exhibits no edema.   Neurological: He is alert.   Vitals reviewed.      Assessment:       1. Uncontrolled type 2 diabetes mellitus with stage 3  chronic kidney disease, without long-term current use of insulin    2. Hypertension associated with diabetes    3. Body mass index (BMI) of 33.0-33.9 in adult     4. Obesity (BMI 30.0-34.9)    5. Hyperlipidemia with target low density lipoprotein (LDL) cholesterol less than 100 mg/dL    6. Coronary artery disease involving native coronary artery of native heart without angina pectoris    7. Type 2 diabetes mellitus without complication, unspecified long term insulin use status        Plan:       Amdaeo was seen today for follow-up.    Diagnoses and all orders for this visit:    Uncontrolled type 2 diabetes mellitus with stage 3 chronic kidney disease, without long-term current use of insulin  -     Ambulatory referral to Endocrinology  -     Ambulatory consult to Diabetic Education  -      -     glipiZIDE (GLUCOTROL) 10 MG tablet; 20 mg q am and 10 mg q pm    Hypertension associated with diabetes  Continue as Is   Body mass index (BMI) of 33.0-33.9 in adult     Obesity (BMI 30.0-34.9)    Hyperlipidemia with target low density lipoprotein (LDL) cholesterol less than 100 mg/dL  Increase pravastin back to 80 mg qd   Co q 10   Coronary artery disease involving native coronary artery of native heart without angina pectoris  Follow up as scheduled with cardiology   Screen for colon cancer  -     Fecal Immunochemical Test (iFOBT); Future    Patient readiness: acceptance and barriers:readiness    During the course of the visit the patient was educated and counseled about the following:     Diabetes:  Reminded to bring in blood sugar diary at next visit.  see above  Hypertension:   Medication: no change.  Obesity:   diet referral     Goals: Diabetes: Maintain Hemoglobin A1C below 7, Hypertension: Reduce Blood Pressure and Obesity: Reduce calorie intake and BMI    Did patient meet goals/outcomes: No    The following self management tools provided: blood pressure log  blood glucose log    Patient Instructions (the written  plan) was given to the patient/family.     Time spent with patient: 45 minutes    Barriers to medications present (yes )    Adverse reactions to current medications (no)    Over the counter medications reviewed (Yes)

## 2018-02-08 ENCOUNTER — PATIENT MESSAGE (OUTPATIENT)
Dept: FAMILY MEDICINE | Facility: CLINIC | Age: 68
End: 2018-02-08

## 2018-02-08 DIAGNOSIS — N18.30 STAGE 3 CHRONIC KIDNEY DISEASE: Primary | ICD-10-CM

## 2018-02-14 ENCOUNTER — TELEPHONE (OUTPATIENT)
Dept: FAMILY MEDICINE | Facility: CLINIC | Age: 68
End: 2018-02-14

## 2018-02-14 NOTE — TELEPHONE ENCOUNTER
Spoke with patient, informed of lab results and PCP/PA recommendations to follow up with nephro. Patient states he will be out of town until next month and will follow up when he returns. Understanding verbalized.

## 2018-02-14 NOTE — TELEPHONE ENCOUNTER
----- Message from Jacqueline Mane sent at 2/14/2018  8:24 AM CST -----  Contact: patient   Patient calling in regards to a call received a week ago. NP stated that he had to go to see a kidney Doctor. He wants to know how serious this matter is. Please advise. He is [place] of state and will not be back until some time in March. Call to pod. No answer.  Call back    Thanks!

## 2018-04-26 ENCOUNTER — TELEPHONE (OUTPATIENT)
Dept: FAMILY MEDICINE | Facility: CLINIC | Age: 68
End: 2018-04-26

## 2018-04-26 NOTE — TELEPHONE ENCOUNTER
----- Message from Saphpire Franco LPN sent at 4/26/2018  9:18 AM CDT -----  Contact: Patient      ----- Message -----  From: Sapphire Franco LPN  Sent: 4/25/2018   4:59 PM  To: Sapphire Liu LPN        ----- Message -----  From: Manoj Ewing  Sent: 4/25/2018   3:49 PM  To: [facility] Staff    Amadeo, [phone]. Calling to reschedule his 5/2 appointment until about the last week of May or sometime in June. He's out of town and won't be back until around that time. No schedule available for me to schedule. Please advise. Thanks.

## 2018-05-03 DIAGNOSIS — E11.9 TYPE 2 DIABETES MELLITUS WITHOUT COMPLICATION: ICD-10-CM

## 2018-05-21 DIAGNOSIS — E11.9 DIABETES MELLITUS WITHOUT COMPLICATION: ICD-10-CM

## 2018-05-21 RX ORDER — METFORMIN HYDROCHLORIDE 1000 MG/1
TABLET ORAL
Qty: 180 TABLET | Refills: 1 | Status: SHIPPED | OUTPATIENT
Start: 2018-05-21 | End: 2018-11-26 | Stop reason: SDUPTHER

## 2018-11-26 ENCOUNTER — DOCUMENTATION ONLY (OUTPATIENT)
Dept: FAMILY MEDICINE | Facility: CLINIC | Age: 68
End: 2018-11-26

## 2018-11-26 ENCOUNTER — OFFICE VISIT (OUTPATIENT)
Dept: FAMILY MEDICINE | Facility: CLINIC | Age: 68
End: 2018-11-26
Payer: MEDICARE

## 2018-11-26 VITALS
SYSTOLIC BLOOD PRESSURE: 180 MMHG | BODY MASS INDEX: 34.78 KG/M2 | DIASTOLIC BLOOD PRESSURE: 90 MMHG | HEART RATE: 73 BPM | WEIGHT: 234.81 LBS | HEIGHT: 69 IN | TEMPERATURE: 98 F

## 2018-11-26 DIAGNOSIS — E66.9 OBESITY (BMI 30.0-34.9): ICD-10-CM

## 2018-11-26 DIAGNOSIS — Z12.11 COLON CANCER SCREENING: ICD-10-CM

## 2018-11-26 DIAGNOSIS — Z23 NEEDS FLU SHOT: ICD-10-CM

## 2018-11-26 DIAGNOSIS — E78.5 HYPERLIPIDEMIA WITH TARGET LOW DENSITY LIPOPROTEIN (LDL) CHOLESTEROL LESS THAN 100 MG/DL: ICD-10-CM

## 2018-11-26 DIAGNOSIS — I25.10 CORONARY ARTERY DISEASE, ANGINA PRESENCE UNSPECIFIED, UNSPECIFIED VESSEL OR LESION TYPE, UNSPECIFIED WHETHER NATIVE OR TRANSPLANTED HEART: ICD-10-CM

## 2018-11-26 DIAGNOSIS — I10 HYPERTENSION, UNCONTROLLED: Primary | ICD-10-CM

## 2018-11-26 PROCEDURE — 90662 IIV NO PRSV INCREASED AG IM: CPT | Mod: S$GLB,,, | Performed by: PHYSICIAN ASSISTANT

## 2018-11-26 PROCEDURE — 3078F DIAST BP <80 MM HG: CPT | Mod: CPTII,S$GLB,, | Performed by: PHYSICIAN ASSISTANT

## 2018-11-26 PROCEDURE — 1101F PT FALLS ASSESS-DOCD LE1/YR: CPT | Mod: CPTII,S$GLB,, | Performed by: PHYSICIAN ASSISTANT

## 2018-11-26 PROCEDURE — G0008 ADMIN INFLUENZA VIRUS VAC: HCPCS | Mod: S$GLB,,, | Performed by: PHYSICIAN ASSISTANT

## 2018-11-26 PROCEDURE — 99214 OFFICE O/P EST MOD 30 MIN: CPT | Mod: 25,S$GLB,, | Performed by: PHYSICIAN ASSISTANT

## 2018-11-26 PROCEDURE — 99999 PR PBB SHADOW E&M-EST. PATIENT-LVL III: CPT | Mod: PBBFAC,,, | Performed by: PHYSICIAN ASSISTANT

## 2018-11-26 PROCEDURE — 3045F PR MOST RECENT HEMOGLOBIN A1C LEVEL 7.0-9.0%: CPT | Mod: CPTII,S$GLB,, | Performed by: PHYSICIAN ASSISTANT

## 2018-11-26 PROCEDURE — 3077F SYST BP >= 140 MM HG: CPT | Mod: CPTII,S$GLB,, | Performed by: PHYSICIAN ASSISTANT

## 2018-11-26 RX ORDER — GLIPIZIDE 10 MG/1
TABLET ORAL
Qty: 270 TABLET | Refills: 3
Start: 2018-11-26 | End: 2019-12-20 | Stop reason: SDUPTHER

## 2018-11-26 RX ORDER — METFORMIN HYDROCHLORIDE 1000 MG/1
1000 TABLET ORAL 2 TIMES DAILY WITH MEALS
Qty: 180 TABLET | Refills: 1 | Status: SHIPPED | OUTPATIENT
Start: 2018-11-26 | End: 2019-12-20 | Stop reason: SDUPTHER

## 2018-11-26 RX ORDER — VALSARTAN AND HYDROCHLOROTHIAZIDE 320; 25 MG/1; MG/1
1 TABLET, FILM COATED ORAL DAILY
Qty: 90 TABLET | Refills: 3 | Status: SHIPPED | OUTPATIENT
Start: 2018-11-26 | End: 2019-12-20

## 2018-11-26 RX ORDER — PRAVASTATIN SODIUM 80 MG/1
40 TABLET ORAL DAILY
Qty: 45 TABLET | Refills: 3 | Status: SHIPPED | OUTPATIENT
Start: 2018-11-26 | End: 2019-12-20

## 2018-11-26 NOTE — PROGRESS NOTES
Subjective:       Patient ID: Amadeo Levin is a 68 y.o. male.    Chief Complaint: Routine follow-up    HPI   Patient is a 68 year old  male presenting to the clinic for follow-up on chronic health conditions. Patient presents with very elevated blood pressure today 207/87 and 180/90 on recheck. He reports he has been out of his amlodipine for a very long time, but his cuff readings at home are improved. He agrees to restart medicine and return for BP check. Also, patient with uncontrolled diabetes requesting metformin and glipizide as he is close to being out. He is not compliant with diabetic diet or exercise. He sees his cardiologist- Dr. Baltazar next week. He has seen dermatology for skin cancer screening within the past week.   Review of Systems   Constitutional: Negative for activity change, appetite change, chills, fatigue and fever.   HENT: Negative for congestion, ear pain, postnasal drip, rhinorrhea and sinus pressure.    Respiratory: Negative for cough, shortness of breath and wheezing.    Cardiovascular: Negative for chest pain and palpitations.   Gastrointestinal: Negative for abdominal pain, constipation, diarrhea, nausea and vomiting.   Genitourinary: Negative for frequency, hematuria and urgency.   Musculoskeletal: Negative for back pain and gait problem.   Skin: Negative for rash.   Neurological: Negative for syncope, weakness and headaches.   Psychiatric/Behavioral: Negative for agitation and confusion.       Objective:      Physical Exam   Constitutional: Vital signs are normal. He appears well-developed and well-nourished. No distress.   Cardiovascular: Normal rate, regular rhythm, S1 normal, S2 normal and normal heart sounds. Exam reveals no gallop.   No murmur heard.  Pulses:       Radial pulses are 2+ on the right side, and 2+ on the left side.        Dorsalis pedis pulses are 2+ on the right side, and 2+ on the left side.        Posterior tibial pulses are 2+ on the right side, and  2+ on the left side.   <2sec cap refill fingers bilat     Pulmonary/Chest: Effort normal and breath sounds normal. No respiratory distress. He has no wheezes. He has no rhonchi.   Feet:   Right Foot:   Protective Sensation: 7 sites tested. 5 sites sensed.   Skin Integrity: Positive for dry skin. Negative for ulcer, blister, skin breakdown, erythema, warmth or callus.   Left Foot:   Protective Sensation: 7 sites tested. 5 sites sensed.   Skin Integrity: Positive for dry skin. Negative for ulcer, blister, skin breakdown, erythema, warmth or callus.   Skin: Skin is warm and dry. He is not diaphoretic.   Appropriate skin turgor   Psychiatric: He has a normal mood and affect. His speech is normal and behavior is normal. Judgment and thought content normal. Cognition and memory are normal.       Assessment:       1. Hypertension, uncontrolled    2. Uncontrolled type 2 diabetes mellitus with stage 3 chronic kidney disease, without long-term current use of insulin    3. Hyperlipidemia with target low density lipoprotein (LDL) cholesterol less than 100 mg/dL    4. Coronary artery disease, angina presence unspecified, unspecified vessel or lesion type, unspecified whether native or transplanted heart    5. Obesity (BMI 30.0-34.9)    6. Needs flu shot    7. Colon cancer screening        Plan:       Amadeo was seen today for annual exam.    Diagnoses and all orders for this visit:    Hypertension, uncontrolled  -     CBC auto differential; Future  -     Comprehensive metabolic panel; Future  -     CBC auto differential  -     Comprehensive metabolic panel    Uncontrolled type 2 diabetes mellitus with stage 3 chronic kidney disease, without long-term current use of insulin  -     Comprehensive metabolic panel; Future  -     Hemoglobin A1c; Future  -     Lipid panel; Future  -     Comprehensive metabolic panel  -     Hemoglobin A1c  -     Lipid panel  -     metFORMIN (GLUCOPHAGE) 1000 MG tablet; Take 1 tablet (1,000 mg total) by  mouth 2 (two) times daily with meals.  -     glipiZIDE (GLUCOTROL) 10 MG tablet; 20 mg q am and 10 mg q pm    Hyperlipidemia with target low density lipoprotein (LDL) cholesterol less than 100 mg/dL  -     Comprehensive metabolic panel; Future  -     Hemoglobin A1c; Future  -     Lipid panel; Future  -     pravastatin (PRAVACHOL) 80 MG tablet; Take 0.5 tablets (40 mg total) by mouth once daily.        Coronary artery disease, angina presence unspecified, unspecified vessel or lesion type, unspecified whether native or transplanted heart  -     Comprehensive metabolic panel; Future  -     Hemoglobin A1c; Future  -     Lipid panel; Future    -     pravastatin (PRAVACHOL) 80 MG tablet; Take 0.5 tablets (40 mg total) by mouth once daily.      Obesity (BMI 30.0-34.9)    Needs flu shot  -     Influenza - High Dose (65+) (PF) (IM)    Colon cancer screening    Other orders  -     Cancel: Hemoglobin A1c; Future  -     valsartan-hydrochlorothiazide (DIOVAN-HCT) 320-25 mg per tablet; Take 1 tablet by mouth once daily.  -     pravastatin (PRAVACHOL) 80 MG tablet; Take 0.5 tablets (40 mg total) by mouth once daily.    Patient readiness: acceptance and barriers:occupational issues    During the course of the visit the patient was educated and counseled about the following:     Diabetes:  Discussed general issues about diabetes pathophysiology and management.  Encouraged aerobic exercise.  Discussed foot care.  Reminded to get yearly retinal exam.  Restarted sulfonylurea; see medication orders.   Restarted metformin; see  medication orders.  Follow up in 3 months or as needed.  Hypertension:   Medication: resume amlodipine 5mg daily.  Regular aerobic exercise.  Check blood pressures daily and record.  Follow up: 2 weeks and as needed.  Obesity:   Regular aerobic exercise program discussed.    Goals: Diabetes: Maintain Hemoglobin A1C below 7, Hypertension: Reduce Blood Pressure and Obesity: Reduce calorie intake and BMI    Did  patient meet goals/outcomes: No    The following self management tools provided: blood pressure log  blood glucose log    Patient Instructions (the written plan) was given to the patient/family.     Time spent with patient: 30 minutes    Barriers to medications present (no )    Adverse reactions to current medications (no)    Over the counter medications reviewed (Yes)

## 2018-11-30 ENCOUNTER — TELEPHONE (OUTPATIENT)
Dept: FAMILY MEDICINE | Facility: CLINIC | Age: 68
End: 2018-11-30

## 2018-11-30 NOTE — TELEPHONE ENCOUNTER
----- Message from Darinel Ramírez sent at 11/30/2018 11:18 AM CST -----  Contact: patient  Type: Needs Medical Advice    Who Called:  Patient  Symptoms (please be specific):    How long has patient had these symptoms:    Pharmacy name and phone #:    Best Call Back Number: [phone]  Additional Information: called to advise stated that he couldn't have labs done,came down with the flu,after taking the shot. Will go have labs done 12/03/18.

## 2018-12-04 ENCOUNTER — TELEPHONE (OUTPATIENT)
Dept: FAMILY MEDICINE | Facility: CLINIC | Age: 68
End: 2018-12-04

## 2018-12-04 NOTE — TELEPHONE ENCOUNTER
"Spoke to patient. Patient appears upset by the tone of his voice.Patient states he does not know why Yaneli sent in a prescription for valsartan states his cardiologist refills this and he did not request this. Notify patient that I will look into his chart to see when it was sent in and how we got the request for it. Patient states the medication is on recall and his cardiologist told him not to take the valsartan and to throw away. Patient states he  the medication and paid 25 dollars. Advise patient to hold one min so I can review his chart patient states "I will not hold, I am will get another doctor somewhere else" patient then ended call   "

## 2018-12-04 NOTE — TELEPHONE ENCOUNTER
----- Message from Barbara Infante sent at 12/4/2018  3:23 PM CST -----  Type: Needs Medical Advice    Who Called: Patient   Best Call Back Number: [phone]  Additional Information: patient is requesting to know why valsartan-hydrochlorothiazide (DIOVAN-HCT) 320-25 mg per tablet was prescribed for him, he states he heart doctor took him off of this medication. Patient is upset regarding this issue.     Thank you

## 2019-12-04 ENCOUNTER — TELEPHONE (OUTPATIENT)
Dept: FAMILY MEDICINE | Facility: CLINIC | Age: 69
End: 2019-12-04

## 2019-12-04 NOTE — TELEPHONE ENCOUNTER
Action from W for [place] BP check. Spoke with pt he states he has an appt this month we will check it then. Pt also states he gets White coat syndrome when he comes in. He states he has been doing good with it and he is out of town right now but will be here for appt.

## 2019-12-17 ENCOUNTER — OFFICE VISIT (OUTPATIENT)
Dept: FAMILY MEDICINE | Facility: CLINIC | Age: 69
End: 2019-12-17
Payer: MEDICARE

## 2019-12-17 VITALS
BODY MASS INDEX: 34.07 KG/M2 | HEIGHT: 69 IN | SYSTOLIC BLOOD PRESSURE: 158 MMHG | HEART RATE: 64 BPM | DIASTOLIC BLOOD PRESSURE: 72 MMHG | WEIGHT: 230 LBS

## 2019-12-17 DIAGNOSIS — E78.5 HYPERLIPIDEMIA WITH TARGET LOW DENSITY LIPOPROTEIN (LDL) CHOLESTEROL LESS THAN 100 MG/DL: ICD-10-CM

## 2019-12-17 DIAGNOSIS — I25.10 CORONARY ARTERY DISEASE, ANGINA PRESENCE UNSPECIFIED, UNSPECIFIED VESSEL OR LESION TYPE, UNSPECIFIED WHETHER NATIVE OR TRANSPLANTED HEART: ICD-10-CM

## 2019-12-17 DIAGNOSIS — I10 HTN (HYPERTENSION), BENIGN: ICD-10-CM

## 2019-12-17 DIAGNOSIS — Z23 NEED FOR IMMUNIZATION AGAINST INFLUENZA: ICD-10-CM

## 2019-12-17 DIAGNOSIS — E11.9 TYPE 2 DIABETES MELLITUS WITHOUT COMPLICATION, UNSPECIFIED WHETHER LONG TERM INSULIN USE: ICD-10-CM

## 2019-12-17 DIAGNOSIS — E66.9 OBESITY (BMI 30.0-34.9): ICD-10-CM

## 2019-12-17 DIAGNOSIS — L82.1 SEBORRHEIC KERATOSES: Primary | ICD-10-CM

## 2019-12-17 PROCEDURE — 1101F PT FALLS ASSESS-DOCD LE1/YR: CPT | Mod: S$GLB,,, | Performed by: NURSE PRACTITIONER

## 2019-12-17 PROCEDURE — 3078F DIAST BP <80 MM HG: CPT | Mod: S$GLB,,, | Performed by: NURSE PRACTITIONER

## 2019-12-17 PROCEDURE — 99214 PR OFFICE/OUTPT VISIT, EST, LEVL IV, 30-39 MIN: ICD-10-PCS | Mod: 25,S$GLB,, | Performed by: NURSE PRACTITIONER

## 2019-12-17 PROCEDURE — G0008 ADMIN INFLUENZA VIRUS VAC: HCPCS | Mod: S$GLB,,, | Performed by: NURSE PRACTITIONER

## 2019-12-17 PROCEDURE — 1159F MED LIST DOCD IN RCRD: CPT | Mod: S$GLB,,, | Performed by: NURSE PRACTITIONER

## 2019-12-17 PROCEDURE — 3077F SYST BP >= 140 MM HG: CPT | Mod: S$GLB,,, | Performed by: NURSE PRACTITIONER

## 2019-12-17 PROCEDURE — 3078F PR MOST RECENT DIASTOLIC BLOOD PRESSURE < 80 MM HG: ICD-10-PCS | Mod: S$GLB,,, | Performed by: NURSE PRACTITIONER

## 2019-12-17 PROCEDURE — 90662 FLU VACCINE - HIGH DOSE (65+) PRESERVATIVE FREE IM: ICD-10-PCS | Mod: S$GLB,,, | Performed by: NURSE PRACTITIONER

## 2019-12-17 PROCEDURE — G0008 FLU VACCINE - HIGH DOSE (65+) PRESERVATIVE FREE IM: ICD-10-PCS | Mod: S$GLB,,, | Performed by: NURSE PRACTITIONER

## 2019-12-17 PROCEDURE — 1159F PR MEDICATION LIST DOCUMENTED IN MEDICAL RECORD: ICD-10-PCS | Mod: S$GLB,,, | Performed by: NURSE PRACTITIONER

## 2019-12-17 PROCEDURE — 99214 OFFICE O/P EST MOD 30 MIN: CPT | Mod: 25,S$GLB,, | Performed by: NURSE PRACTITIONER

## 2019-12-17 PROCEDURE — 90662 IIV NO PRSV INCREASED AG IM: CPT | Mod: S$GLB,,, | Performed by: NURSE PRACTITIONER

## 2019-12-17 PROCEDURE — 3077F PR MOST RECENT SYSTOLIC BLOOD PRESSURE >= 140 MM HG: ICD-10-PCS | Mod: S$GLB,,, | Performed by: NURSE PRACTITIONER

## 2019-12-17 PROCEDURE — 1101F PR PT FALLS ASSESS DOC 0-1 FALLS W/OUT INJ PAST YR: ICD-10-PCS | Mod: S$GLB,,, | Performed by: NURSE PRACTITIONER

## 2019-12-17 NOTE — PROGRESS NOTES
SUBJECTIVE:    Patient ID: Amadeo Levin is a 69 y.o. male.    Chief Complaint: Follow-up (no bottles// SW)    69-YEAR-OLD MALE PRESENTING FOR ROUTINE CHECKUP.  REPORTS HAS BEEN OUT OF JANUVIA TIMES ABOUT 6 WEEKS.  PATIENT HAS NOT BEEN CHECKING BLOOD SUGAR REGULARLY.  PATIENT REPORTS HAS BEEN EATING LOTS OF SWEETS.  NOT CHECKING BLOOD PRESSURE DAILY.  DID NOT TAKE BLOOD PRESSURE MEDICATION TODAY.  NOT EXERCISING TRAVELS FREQUENTLY FOR WORK.      No visits with results within 6 Month(s) from this visit.   Latest known visit with results is:   Lab Visit on 01/04/2018   Component Date Value Ref Range Status    WBC 01/04/2018 7.23  3.90 - 12.70 K/uL Final    RBC 01/04/2018 4.04* 4.60 - 6.20 M/uL Final    Hemoglobin 01/04/2018 12.4* 14.0 - 18.0 g/dL Final    Hematocrit 01/04/2018 37.2* 40.0 - 54.0 % Final    Mean Corpuscular Volume 01/04/2018 92  82 - 98 fL Final    Mean Corpuscular Hemoglobin 01/04/2018 30.7  27.0 - 31.0 pg Final    Mean Corpuscular Hemoglobin Conc 01/04/2018 33.3  32.0 - 36.0 g/dL Final    RDW 01/04/2018 12.9  11.5 - 14.5 % Final    Platelets 01/04/2018 193  150 - 350 K/uL Final    MPV 01/04/2018 10.8  9.2 - 12.9 fL Final    Immature Granulocytes 01/04/2018 0.3  0.0 - 0.5 % Final    Gran # (ANC) 01/04/2018 4.6  1.8 - 7.7 K/uL Final    Immature Grans (Abs) 01/04/2018 0.02  0.00 - 0.04 K/uL Final    Lymph # 01/04/2018 1.8  1.0 - 4.8 K/uL Final    Mono # 01/04/2018 0.5  0.3 - 1.0 K/uL Final    Eos # 01/04/2018 0.2  0.0 - 0.5 K/uL Final    Baso # 01/04/2018 0.09  0.00 - 0.20 K/uL Final    nRBC 01/04/2018 0  0 /100 WBC Final    Gran% 01/04/2018 63.3  38.0 - 73.0 % Final    Lymph% 01/04/2018 24.9  18.0 - 48.0 % Final    Mono% 01/04/2018 7.1  4.0 - 15.0 % Final    Eosinophil% 01/04/2018 3.2  0.0 - 8.0 % Final    Basophil% 01/04/2018 1.2  0.0 - 1.9 % Final    Differential Method 01/04/2018 Automated   Final    Sodium 01/04/2018 140  136 - 145 mmol/L Final    Potassium 01/04/2018  4.4  3.5 - 5.1 mmol/L Final    Chloride 01/04/2018 106  95 - 110 mmol/L Final    CO2 01/04/2018 25  23 - 29 mmol/L Final    Glucose 01/04/2018 228* 70 - 110 mg/dL Final    BUN, Bld 01/04/2018 27* 8 - 23 mg/dL Final    Creatinine 01/04/2018 1.6* 0.5 - 1.4 mg/dL Final    Calcium 01/04/2018 9.6  8.7 - 10.5 mg/dL Final    Total Protein 01/04/2018 7.3  6.0 - 8.4 g/dL Final    Albumin 01/04/2018 3.8  3.5 - 5.2 g/dL Final    Total Bilirubin 01/04/2018 0.5  0.1 - 1.0 mg/dL Final    Alkaline Phosphatase 01/04/2018 61  55 - 135 U/L Final    AST 01/04/2018 23  10 - 40 U/L Final    ALT 01/04/2018 33  10 - 44 U/L Final    Anion Gap 01/04/2018 9  8 - 16 mmol/L Final    eGFR if  01/04/2018 50.1* >60 mL/min/1.73 m^2 Final    eGFR if non African American 01/04/2018 43.3* >60 mL/min/1.73 m^2 Final    Hemoglobin A1C 01/04/2018 9.0* 4.0 - 5.6 % Final    Estimated Avg Glucose 01/04/2018 212* 68 - 131 mg/dL Final    Cholesterol 01/04/2018 231* 120 - 199 mg/dL Final    Triglycerides 01/04/2018 231* 30 - 150 mg/dL Final    HDL 01/04/2018 38* 40 - 75 mg/dL Final    LDL Cholesterol 01/04/2018 146.8  63.0 - 159.0 mg/dL Final    Hdl/Cholesterol Ratio 01/04/2018 16.5* 20.0 - 50.0 % Final    Total Cholesterol/HDL Ratio 01/04/2018 6.1* 2.0 - 5.0 Final    Non-HDL Cholesterol 01/04/2018 193  mg/dL Final    TSH 01/04/2018 1.094  0.400 - 4.000 uIU/mL Final    Vit D, 25-Hydroxy 01/04/2018 69  30 - 96 ng/mL Final       Past Medical History:   Diagnosis Date    Coronary artery disease     Diabetes mellitus type II     Hypertension      Past Surgical History:   Procedure Laterality Date    APPENDECTOMY      HERNIA REPAIR      stents       Family History   Problem Relation Age of Onset    Diabetes Mother     Cancer Neg Hx     Macular degeneration Neg Hx     Retinal detachment Neg Hx     Glaucoma Neg Hx        Marital Status:   Alcohol History:  reports that he drinks alcohol.  Tobacco History:   reports that he has never smoked. He has never used smokeless tobacco.  Drug History:  reports that he does not use drugs.    Review of patient's allergies indicates:   Allergen Reactions    Morphine Nausea And Vomiting    Vicodin [hydrocodone-acetaminophen] Nausea And Vomiting       Current Outpatient Medications:     alcohol swabs (BD ALCOHOL SWABS) PadM, Apply 1 each topically as needed., Disp: 100 each, Rfl: 6    amlodipine (NORVASC) 5 MG tablet, Take 5 mg by mouth once daily. , Disp: , Rfl:     aspirin (ECOTRIN) 81 MG EC tablet, Take 81 mg by mouth once daily., Disp: , Rfl:     blood glucose control high,low (ACCU-CHEK OLGA CONTROL SOLN) Soln, 1 each by Misc.(Non-Drug; Combo Route) route as directed., Disp: 1 each, Rfl: 11    blood sugar diagnostic (ACCU-CHEK OLGA PLUS TEST STRP) Strp, 1 each by Misc.(Non-Drug; Combo Route) route daily as needed., Disp: 100 each, Rfl: 6    blood-glucose meter (ACCU-CHEK OLGA PLUS METER) Misc, Or insurance approved.  Strips to match.  Once daily use.  DM2, Disp: 1 each, Rfl: 0    carvedilol (COREG) 6.25 MG tablet, Take 6.25 mg by mouth 2 (two) times daily. , Disp: , Rfl:     ELIQUIS 2.5 mg Tab, Take 2.5 mg by mouth 2 (two) times daily. , Disp: , Rfl:     fish oil-omega-3 fatty acids 300-1,000 mg capsule, Take 2 g by mouth once daily., Disp: , Rfl:     glipiZIDE (GLUCOTROL) 10 MG tablet, 20 mg q am and 10 mg q pm, Disp: 270 tablet, Rfl: 3    lancets (ACCU-CHEK SOFTCLIX LANCETS) Misc, 1 each by Misc.(Non-Drug; Combo Route) route daily as needed., Disp: 100 each, Rfl: 6    metFORMIN (GLUCOPHAGE) 1000 MG tablet, Take 1 tablet (1,000 mg total) by mouth 2 (two) times daily with meals., Disp: 180 tablet, Rfl: 1    MULTIVITAMIN W-MINERALS/LUTEIN (CENTRUM SILVER ORAL), Take by mouth., Disp: , Rfl:     pravastatin (PRAVACHOL) 80 MG tablet, Take 0.5 tablets (40 mg total) by mouth once daily., Disp: 45 tablet, Rfl: 3    valsartan-hydrochlorothiazide (DIOVAN-HCT) 320-25  "mg per tablet, Take 1 tablet by mouth once daily., Disp: 90 tablet, Rfl: 3    vitamin D 1000 units Tab, Take 185 mg by mouth once daily., Disp: , Rfl:     Review of Systems   Constitutional: Negative for fatigue, fever and unexpected weight change.   HENT: Negative for ear pain, sinus pressure and sore throat.    Eyes: Negative for pain.   Respiratory: Negative for cough and shortness of breath.    Cardiovascular: Negative for chest pain and leg swelling.   Gastrointestinal: Negative for abdominal pain, constipation, nausea and vomiting.   Genitourinary: Negative for dysuria, frequency and urgency.   Musculoskeletal: Negative for arthralgias.   Skin: Negative for rash.   Neurological: Negative for dizziness, weakness and headaches.   Psychiatric/Behavioral: Negative for sleep disturbance.          Objective:      Vitals:    12/17/19 1003 12/17/19 1006   BP: (!) 158/72 (!) 158/72   Pulse: 64    Weight: 104.3 kg (230 lb)    Height: 5' 9" (1.753 m)      Body mass index is 33.97 kg/m².  Physical Exam   Constitutional: He is oriented to person, place, and time. He appears well-developed and well-nourished.   ABDOMINAL OBESITY   HENT:   Right Ear: External ear normal.   Left Ear: External ear normal.   Mouth/Throat: Oropharynx is clear and moist.   Neck: Neck supple. No tracheal deviation present.   Cardiovascular: Normal rate and regular rhythm. Exam reveals no gallop and no friction rub.   No murmur heard.  Pulmonary/Chest: Breath sounds normal. No stridor. He has no wheezes. He has no rales.   Abdominal: Soft. He exhibits no mass. There is no tenderness.   Musculoskeletal: He exhibits edema (A TRACE PITTING EDEMA BILATERALLY). He exhibits no tenderness or deformity.   Lymphadenopathy:     He has no cervical adenopathy.   Neurological: He is alert and oriented to person, place, and time. Coordination normal.   Skin: Skin is warm and dry.   Psychiatric: He has a normal mood and affect. Thought content normal.     "     Assessment:       1. Seborrheic keratoses    2. Coronary artery disease, angina presence unspecified, unspecified vessel or lesion type, unspecified whether native or transplanted heart    3. Hyperlipidemia with target low density lipoprotein (LDL) cholesterol less than 100 mg/dL    4. HTN (hypertension), benign    5. Type 2 diabetes mellitus without complication, unspecified whether long term insulin use    6. Obesity (BMI 30.0-34.9)    7. Need for immunization against influenza         Plan:       Seborrheic keratoses    Coronary artery disease, angina presence unspecified, unspecified vessel or lesion type, unspecified whether native or transplanted heart    Hyperlipidemia with target low density lipoprotein (LDL) cholesterol less than 100 mg/dL    HTN (hypertension), benign    Type 2 diabetes mellitus without complication, unspecified whether long term insulin use  Comments:  PATIENT DOES NOT WANT START INJECTABLES TODAY WILL RESTART JANUVIA AND PATIENT AGREES TO WORK ON DIET.  AWARE OF RISKS OF UNCONTROLLED BLOOD SUGAR.  Orders:  -     Hemoglobin A1C, POCT    Obesity (BMI 30.0-34.9)    Need for immunization against influenza  -     Influenza - High Dose (65+) (PF) (IM)      Follow up in about 6 weeks (around 1/28/2020).

## 2019-12-19 ENCOUNTER — TELEPHONE (OUTPATIENT)
Dept: FAMILY MEDICINE | Facility: CLINIC | Age: 69
End: 2019-12-19

## 2019-12-19 NOTE — TELEPHONE ENCOUNTER
----- Message from Geovanna Bishop sent at 12/19/2019  8:15 AM CST -----  - pt was in Tuesday and needs refill on his medications. He would like a call back to go over what he needs  [facility]  [phone]

## 2019-12-20 ENCOUNTER — CLINICAL SUPPORT (OUTPATIENT)
Dept: FAMILY MEDICINE | Facility: CLINIC | Age: 69
End: 2019-12-20
Payer: MEDICARE

## 2019-12-20 DIAGNOSIS — E78.2 MIXED HYPERLIPIDEMIA: ICD-10-CM

## 2019-12-20 DIAGNOSIS — I10 ESSENTIAL HYPERTENSION: Primary | ICD-10-CM

## 2019-12-20 RX ORDER — PRAVASTATIN SODIUM 40 MG/1
40 TABLET ORAL DAILY
Qty: 90 TABLET | Refills: 1 | Status: SHIPPED | OUTPATIENT
Start: 2019-12-20 | End: 2020-05-27 | Stop reason: SDUPTHER

## 2019-12-20 RX ORDER — PRAVASTATIN SODIUM 40 MG/1
40 TABLET ORAL DAILY
COMMUNITY
Start: 2019-10-02 | End: 2019-12-20 | Stop reason: SDUPTHER

## 2019-12-20 RX ORDER — IRBESARTAN AND HYDROCHLOROTHIAZIDE 300; 12.5 MG/1; MG/1
1 TABLET, FILM COATED ORAL DAILY
Qty: 90 TABLET | Refills: 1 | Status: SHIPPED | OUTPATIENT
Start: 2019-12-20 | End: 2020-07-06 | Stop reason: SDUPTHER

## 2019-12-20 RX ORDER — IRBESARTAN AND HYDROCHLOROTHIAZIDE 300; 12.5 MG/1; MG/1
1 TABLET, FILM COATED ORAL DAILY
COMMUNITY
Start: 2019-11-05 | End: 2019-12-20 | Stop reason: SDUPTHER

## 2019-12-20 RX ORDER — METFORMIN HYDROCHLORIDE 1000 MG/1
1000 TABLET ORAL 2 TIMES DAILY WITH MEALS
Qty: 180 TABLET | Refills: 1 | Status: SHIPPED | OUTPATIENT
Start: 2019-12-20 | End: 2020-05-27 | Stop reason: SDUPTHER

## 2019-12-20 RX ORDER — GLIPIZIDE 10 MG/1
10 TABLET ORAL 2 TIMES DAILY WITH MEALS
Qty: 180 TABLET | Refills: 1 | Status: SHIPPED | OUTPATIENT
Start: 2019-12-20 | End: 2020-05-27 | Stop reason: SDUPTHER

## 2020-01-06 ENCOUNTER — TELEPHONE (OUTPATIENT)
Dept: FAMILY MEDICINE | Facility: CLINIC | Age: 70
End: 2020-01-06

## 2020-01-06 NOTE — TELEPHONE ENCOUNTER
----- Message from Grace Arauz sent at 1/6/2020 11:18 AM CST -----  Dr. Barr's office needs the most current A1C on the pt. [facility]  #[phone]. Fax #[phone]

## 2020-01-14 ENCOUNTER — TELEPHONE (OUTPATIENT)
Dept: FAMILY MEDICINE | Facility: CLINIC | Age: 70
End: 2020-01-14

## 2020-01-14 DIAGNOSIS — Z79.899 ENCOUNTER FOR LONG-TERM (CURRENT) USE OF OTHER MEDICATIONS: ICD-10-CM

## 2020-01-14 DIAGNOSIS — E78.5 HYPERLIPIDEMIA WITH TARGET LOW DENSITY LIPOPROTEIN (LDL) CHOLESTEROL LESS THAN 100 MG/DL: ICD-10-CM

## 2020-01-14 DIAGNOSIS — Z12.5 SPECIAL SCREENING FOR MALIGNANT NEOPLASM OF PROSTATE: ICD-10-CM

## 2020-01-14 DIAGNOSIS — I10 HTN (HYPERTENSION), BENIGN: ICD-10-CM

## 2020-01-14 DIAGNOSIS — E11.9 TYPE 2 DIABETES MELLITUS WITHOUT COMPLICATION, UNSPECIFIED WHETHER LONG TERM INSULIN USE: ICD-10-CM

## 2020-01-14 DIAGNOSIS — Z00.00 ROUTINE GENERAL MEDICAL EXAMINATION AT A HEALTH CARE FACILITY: Primary | ICD-10-CM

## 2020-01-22 ENCOUNTER — TELEPHONE (OUTPATIENT)
Dept: FAMILY MEDICINE | Facility: CLINIC | Age: 70
End: 2020-01-22

## 2020-01-22 NOTE — TELEPHONE ENCOUNTER
----- Message from Jerod Sheth sent at 1/22/2020  4:26 PM CST -----  Pt is needing samples of insulin   Pt [phone]

## 2020-01-28 ENCOUNTER — OFFICE VISIT (OUTPATIENT)
Dept: FAMILY MEDICINE | Facility: CLINIC | Age: 70
End: 2020-01-28
Payer: MEDICARE

## 2020-01-28 VITALS
SYSTOLIC BLOOD PRESSURE: 156 MMHG | WEIGHT: 230.38 LBS | BODY MASS INDEX: 34.92 KG/M2 | HEIGHT: 68 IN | HEART RATE: 68 BPM | DIASTOLIC BLOOD PRESSURE: 70 MMHG

## 2020-01-28 DIAGNOSIS — Z86.718 HISTORY OF DVT (DEEP VEIN THROMBOSIS): ICD-10-CM

## 2020-01-28 DIAGNOSIS — E78.5 HYPERLIPIDEMIA WITH TARGET LOW DENSITY LIPOPROTEIN (LDL) CHOLESTEROL LESS THAN 100 MG/DL: ICD-10-CM

## 2020-01-28 DIAGNOSIS — I25.10 CORONARY ARTERY DISEASE, ANGINA PRESENCE UNSPECIFIED, UNSPECIFIED VESSEL OR LESION TYPE, UNSPECIFIED WHETHER NATIVE OR TRANSPLANTED HEART: Primary | ICD-10-CM

## 2020-01-28 DIAGNOSIS — E11.9 TYPE 2 DIABETES MELLITUS WITHOUT COMPLICATION, UNSPECIFIED WHETHER LONG TERM INSULIN USE: ICD-10-CM

## 2020-01-28 DIAGNOSIS — I10 HTN (HYPERTENSION), BENIGN: ICD-10-CM

## 2020-01-28 PROCEDURE — 3078F DIAST BP <80 MM HG: CPT | Mod: S$GLB,,, | Performed by: NURSE PRACTITIONER

## 2020-01-28 PROCEDURE — 1159F MED LIST DOCD IN RCRD: CPT | Mod: S$GLB,,, | Performed by: NURSE PRACTITIONER

## 2020-01-28 PROCEDURE — 1101F PT FALLS ASSESS-DOCD LE1/YR: CPT | Mod: S$GLB,,, | Performed by: NURSE PRACTITIONER

## 2020-01-28 PROCEDURE — 1159F PR MEDICATION LIST DOCUMENTED IN MEDICAL RECORD: ICD-10-PCS | Mod: S$GLB,,, | Performed by: NURSE PRACTITIONER

## 2020-01-28 PROCEDURE — 1101F PR PT FALLS ASSESS DOC 0-1 FALLS W/OUT INJ PAST YR: ICD-10-PCS | Mod: S$GLB,,, | Performed by: NURSE PRACTITIONER

## 2020-01-28 PROCEDURE — 3078F PR MOST RECENT DIASTOLIC BLOOD PRESSURE < 80 MM HG: ICD-10-PCS | Mod: S$GLB,,, | Performed by: NURSE PRACTITIONER

## 2020-01-28 PROCEDURE — 99214 PR OFFICE/OUTPT VISIT, EST, LEVL IV, 30-39 MIN: ICD-10-PCS | Mod: S$GLB,,, | Performed by: NURSE PRACTITIONER

## 2020-01-28 PROCEDURE — 99214 OFFICE O/P EST MOD 30 MIN: CPT | Mod: S$GLB,,, | Performed by: NURSE PRACTITIONER

## 2020-01-28 PROCEDURE — 3077F SYST BP >= 140 MM HG: CPT | Mod: S$GLB,,, | Performed by: NURSE PRACTITIONER

## 2020-01-28 PROCEDURE — 3077F PR MOST RECENT SYSTOLIC BLOOD PRESSURE >= 140 MM HG: ICD-10-PCS | Mod: S$GLB,,, | Performed by: NURSE PRACTITIONER

## 2020-02-01 NOTE — PROGRESS NOTES
SUBJECTIVE:    Patient ID: Amadeo Levin is a 69 y.o. male.    Chief Complaint: Follow-up    69year old male presents to discuss medication. Feeling ok. Stopped taking januvia because insurance was not cover. However when taking sugar was much lower. Would like to discuss options.       No visits with results within 6 Month(s) from this visit.   Latest known visit with results is:   Lab Visit on 01/04/2018   Component Date Value Ref Range Status    WBC 01/04/2018 7.23  3.90 - 12.70 K/uL Final    RBC 01/04/2018 4.04* 4.60 - 6.20 M/uL Final    Hemoglobin 01/04/2018 12.4* 14.0 - 18.0 g/dL Final    Hematocrit 01/04/2018 37.2* 40.0 - 54.0 % Final    Mean Corpuscular Volume 01/04/2018 92  82 - 98 fL Final    Mean Corpuscular Hemoglobin 01/04/2018 30.7  27.0 - 31.0 pg Final    Mean Corpuscular Hemoglobin Conc 01/04/2018 33.3  32.0 - 36.0 g/dL Final    RDW 01/04/2018 12.9  11.5 - 14.5 % Final    Platelets 01/04/2018 193  150 - 350 K/uL Final    MPV 01/04/2018 10.8  9.2 - 12.9 fL Final    Immature Granulocytes 01/04/2018 0.3  0.0 - 0.5 % Final    Gran # (ANC) 01/04/2018 4.6  1.8 - 7.7 K/uL Final    Immature Grans (Abs) 01/04/2018 0.02  0.00 - 0.04 K/uL Final    Lymph # 01/04/2018 1.8  1.0 - 4.8 K/uL Final    Mono # 01/04/2018 0.5  0.3 - 1.0 K/uL Final    Eos # 01/04/2018 0.2  0.0 - 0.5 K/uL Final    Baso # 01/04/2018 0.09  0.00 - 0.20 K/uL Final    nRBC 01/04/2018 0  0 /100 WBC Final    Gran% 01/04/2018 63.3  38.0 - 73.0 % Final    Lymph% 01/04/2018 24.9  18.0 - 48.0 % Final    Mono% 01/04/2018 7.1  4.0 - 15.0 % Final    Eosinophil% 01/04/2018 3.2  0.0 - 8.0 % Final    Basophil% 01/04/2018 1.2  0.0 - 1.9 % Final    Differential Method 01/04/2018 Automated   Final    Sodium 01/04/2018 140  136 - 145 mmol/L Final    Potassium 01/04/2018 4.4  3.5 - 5.1 mmol/L Final    Chloride 01/04/2018 106  95 - 110 mmol/L Final    CO2 01/04/2018 25  23 - 29 mmol/L Final    Glucose 01/04/2018 228* 70 - 110  mg/dL Final    BUN, Bld 01/04/2018 27* 8 - 23 mg/dL Final    Creatinine 01/04/2018 1.6* 0.5 - 1.4 mg/dL Final    Calcium 01/04/2018 9.6  8.7 - 10.5 mg/dL Final    Total Protein 01/04/2018 7.3  6.0 - 8.4 g/dL Final    Albumin 01/04/2018 3.8  3.5 - 5.2 g/dL Final    Total Bilirubin 01/04/2018 0.5  0.1 - 1.0 mg/dL Final    Alkaline Phosphatase 01/04/2018 61  55 - 135 U/L Final    AST 01/04/2018 23  10 - 40 U/L Final    ALT 01/04/2018 33  10 - 44 U/L Final    Anion Gap 01/04/2018 9  8 - 16 mmol/L Final    eGFR if  01/04/2018 50.1* >60 mL/min/1.73 m^2 Final    eGFR if non African American 01/04/2018 43.3* >60 mL/min/1.73 m^2 Final    Hemoglobin A1C 01/04/2018 9.0* 4.0 - 5.6 % Final    Estimated Avg Glucose 01/04/2018 212* 68 - 131 mg/dL Final    Cholesterol 01/04/2018 231* 120 - 199 mg/dL Final    Triglycerides 01/04/2018 231* 30 - 150 mg/dL Final    HDL 01/04/2018 38* 40 - 75 mg/dL Final    LDL Cholesterol 01/04/2018 146.8  63.0 - 159.0 mg/dL Final    Hdl/Cholesterol Ratio 01/04/2018 16.5* 20.0 - 50.0 % Final    Total Cholesterol/HDL Ratio 01/04/2018 6.1* 2.0 - 5.0 Final    Non-HDL Cholesterol 01/04/2018 193  mg/dL Final    TSH 01/04/2018 1.094  0.400 - 4.000 uIU/mL Final    Vit D, 25-Hydroxy 01/04/2018 69  30 - 96 ng/mL Final       Past Medical History:   Diagnosis Date    Coronary artery disease     Diabetes mellitus type II     Hypertension      Past Surgical History:   Procedure Laterality Date    APPENDECTOMY      HERNIA REPAIR      stents       Family History   Problem Relation Age of Onset    Diabetes Mother     Cancer Neg Hx     Macular degeneration Neg Hx     Retinal detachment Neg Hx     Glaucoma Neg Hx        Marital Status:   Alcohol History:  reports that he drinks alcohol.  Tobacco History:  reports that he has never smoked. He has never used smokeless tobacco.  Drug History:  reports that he does not use drugs.    Review of patient's allergies  indicates:   Allergen Reactions    Morphine Nausea And Vomiting    Vicodin [hydrocodone-acetaminophen] Nausea And Vomiting       Current Outpatient Medications:     alcohol swabs (BD ALCOHOL SWABS) PadM, Apply 1 each topically as needed., Disp: 100 each, Rfl: 6    aspirin (ECOTRIN) 81 MG EC tablet, Take 81 mg by mouth once daily., Disp: , Rfl:     blood sugar diagnostic (ACCU-CHEK OLGA PLUS TEST STRP) Strp, 1 each by Misc.(Non-Drug; Combo Route) route daily as needed., Disp: 100 each, Rfl: 6    blood-glucose meter (ACCU-CHEK OLGA PLUS METER) Misc, Or insurance approved.  Strips to match.  Once daily use.  DM2, Disp: 1 each, Rfl: 0    carvedilol (COREG) 6.25 MG tablet, Take 6.25 mg by mouth 2 (two) times daily. , Disp: , Rfl:     ELIQUIS 2.5 mg Tab, Take 2.5 mg by mouth 2 (two) times daily. , Disp: , Rfl:     glipiZIDE (GLUCOTROL) 10 MG tablet, Take 1 tablet (10 mg total) by mouth 2 (two) times daily with meals., Disp: 180 tablet, Rfl: 1    irbesartan-hydrochlorothiazide (AVALIDE) 300-12.5 mg per tablet, Take 1 tablet by mouth once daily., Disp: 90 tablet, Rfl: 1    lancets (ACCU-CHEK SOFTCLIX LANCETS) Misc, 1 each by Misc.(Non-Drug; Combo Route) route daily as needed., Disp: 100 each, Rfl: 6    metFORMIN (GLUCOPHAGE) 1000 MG tablet, Take 1 tablet (1,000 mg total) by mouth 2 (two) times daily with meals., Disp: 180 tablet, Rfl: 1    MULTIVITAMIN W-MINERALS/LUTEIN (CENTRUM SILVER ORAL), Take by mouth., Disp: , Rfl:     pravastatin (PRAVACHOL) 40 MG tablet, Take 1 tablet (40 mg total) by mouth once daily., Disp: 90 tablet, Rfl: 1    SITagliptin (JANUVIA) 100 MG Tab, Take 1 tablet (100 mg total) by mouth once daily., Disp: 90 tablet, Rfl: 1    blood glucose control high,low (ACCU-CHEK OLGA CONTROL SOLN) Soln, 1 each by Misc.(Non-Drug; Combo Route) route as directed., Disp: 1 each, Rfl: 11    Review of Systems   Constitutional: Negative for fatigue, fever and unexpected weight change.   Respiratory:  "Negative for cough and shortness of breath.    Cardiovascular: Negative for chest pain and leg swelling.   Gastrointestinal: Negative for abdominal pain, constipation, nausea and vomiting.   Musculoskeletal: Negative for arthralgias.   Skin: Negative for rash.   Psychiatric/Behavioral: Negative for sleep disturbance.          Objective:      Vitals:    01/28/20 0951   BP: (!) 156/70   Pulse: 68   Weight: 104.5 kg (230 lb 6.4 oz)   Height: 5' 8" (1.727 m)     Body mass index is 35.03 kg/m².  Physical Exam   Constitutional: He is oriented to person, place, and time. He appears well-developed and well-nourished.   Cardiovascular: Normal rate, regular rhythm and normal heart sounds.   Pulses:       Dorsalis pedis pulses are 2+ on the right side.        Posterior tibial pulses are 2+ on the right side.   Pulmonary/Chest: Effort normal and breath sounds normal.   Musculoskeletal: Normal range of motion. He exhibits no edema or deformity.        Right foot: There is normal range of motion and no deformity.   Feet:   Right Foot:   Protective Sensation: 5 sites tested. 5 sites sensed.   Left Foot:   Protective Sensation: 5 sites tested. 5 sites sensed.   Skin Integrity: Negative for skin breakdown.   Lymphadenopathy:     He has no cervical adenopathy.   Neurological: He is alert and oriented to person, place, and time.   Skin: Skin is warm and dry.         Assessment:       1. Coronary artery disease, angina presence unspecified, unspecified vessel or lesion type, unspecified whether native or transplanted heart    2. Hyperlipidemia with target low density lipoprotein (LDL) cholesterol less than 100 mg/dL    3. HTN (hypertension), benign    4. History of DVT (deep vein thrombosis)    5. Type 2 diabetes mellitus without complication, unspecified whether long term insulin use         Plan:       Coronary artery disease, angina presence unspecified, unspecified vessel or lesion type, unspecified whether native or transplanted " heart    Hyperlipidemia with target low density lipoprotein (LDL) cholesterol less than 100 mg/dL    HTN (hypertension), benign    History of DVT (deep vein thrombosis)    Type 2 diabetes mellitus without complication, unspecified whether long term insulin use  Comments:  januvia sample to patient      Follow up in about 6 weeks (around 3/10/2020) for Diabetic Check-Up, medication management.

## 2020-03-03 ENCOUNTER — PATIENT MESSAGE (OUTPATIENT)
Dept: FAMILY MEDICINE | Facility: CLINIC | Age: 70
End: 2020-03-03

## 2020-03-09 ENCOUNTER — TELEPHONE (OUTPATIENT)
Dept: FAMILY MEDICINE | Facility: CLINIC | Age: 70
End: 2020-03-09

## 2020-03-09 NOTE — TELEPHONE ENCOUNTER
My chart mess not ready-called pt. Spoke to pt that fasting lab is due prior to 3/16 office visit.

## 2020-03-09 NOTE — TELEPHONE ENCOUNTER
----- Message from [facility], RT sent at 3/9/2020  9:50 AM CDT -----  Would like samples of Januvia. States that he will  when he comes for lab this Wednesday. Pt [phone]

## 2020-04-09 ENCOUNTER — OFFICE VISIT (OUTPATIENT)
Dept: FAMILY MEDICINE | Facility: CLINIC | Age: 70
End: 2020-04-09
Payer: MEDICARE

## 2020-04-09 DIAGNOSIS — E11.9 TYPE 2 DIABETES MELLITUS WITHOUT COMPLICATION, UNSPECIFIED WHETHER LONG TERM INSULIN USE: Primary | ICD-10-CM

## 2020-04-09 DIAGNOSIS — I10 HTN (HYPERTENSION), BENIGN: ICD-10-CM

## 2020-04-09 DIAGNOSIS — E78.5 HYPERLIPIDEMIA WITH TARGET LOW DENSITY LIPOPROTEIN (LDL) CHOLESTEROL LESS THAN 100 MG/DL: ICD-10-CM

## 2020-04-09 DIAGNOSIS — Z86.718 HISTORY OF DVT (DEEP VEIN THROMBOSIS): ICD-10-CM

## 2020-04-09 DIAGNOSIS — I25.10 CORONARY ARTERY DISEASE, ANGINA PRESENCE UNSPECIFIED, UNSPECIFIED VESSEL OR LESION TYPE, UNSPECIFIED WHETHER NATIVE OR TRANSPLANTED HEART: ICD-10-CM

## 2020-04-09 PROCEDURE — 99213 PR OFFICE/OUTPT VISIT, EST, LEVL III, 20-29 MIN: ICD-10-PCS | Mod: 95,,, | Performed by: NURSE PRACTITIONER

## 2020-04-09 PROCEDURE — 1159F MED LIST DOCD IN RCRD: CPT | Mod: 95,,, | Performed by: NURSE PRACTITIONER

## 2020-04-09 PROCEDURE — 1101F PR PT FALLS ASSESS DOC 0-1 FALLS W/OUT INJ PAST YR: ICD-10-PCS | Mod: 95,,, | Performed by: NURSE PRACTITIONER

## 2020-04-09 PROCEDURE — 1159F PR MEDICATION LIST DOCUMENTED IN MEDICAL RECORD: ICD-10-PCS | Mod: 95,,, | Performed by: NURSE PRACTITIONER

## 2020-04-09 PROCEDURE — 1101F PT FALLS ASSESS-DOCD LE1/YR: CPT | Mod: 95,,, | Performed by: NURSE PRACTITIONER

## 2020-04-09 PROCEDURE — 99213 OFFICE O/P EST LOW 20 MIN: CPT | Mod: 95,,, | Performed by: NURSE PRACTITIONER

## 2020-04-09 RX ORDER — LANCETS
EACH MISCELLANEOUS
Qty: 100 EACH | Refills: 0 | Status: SHIPPED | OUTPATIENT
Start: 2020-04-09

## 2020-04-09 RX ORDER — INSULIN PUMP SYRINGE, 3 ML
EACH MISCELLANEOUS
Qty: 1 EACH | Refills: 0 | Status: SHIPPED | OUTPATIENT
Start: 2020-04-09 | End: 2021-03-01 | Stop reason: SDUPTHER

## 2020-04-09 NOTE — PROGRESS NOTES
Subjective:        The chief complaint leading to consultation is: check up  The patient location is:  Home  Visit type: Virtual visit with synchronous audio/video or audio only  This was a video visit in lieu of in-person visit due to the coronavirus emergency. Patient acknowledged and consented to the video visit encounter.     69 year old male presents for virtual visit. Reports overall feels great. Working in yard staying busy around the house. Not checking sugar daily due to machine being broken. However has been eating better. Not going out. 6lb weight loss. Still taking januvia. But will run out this week. Insurance will not cover. Requesting samples. Does not refills. Due for labs this summer.       Past Surgical History:   Procedure Laterality Date    APPENDECTOMY      HERNIA REPAIR      stents       Past Medical History:   Diagnosis Date    Coronary artery disease     Diabetes mellitus type II     Hypertension      Family History   Problem Relation Age of Onset    Diabetes Mother     Cancer Neg Hx     Macular degeneration Neg Hx     Retinal detachment Neg Hx     Glaucoma Neg Hx         Social History:   Marital Status:   Alcohol History:  reports that he drinks alcohol.  Tobacco History:  reports that he has never smoked. He has never used smokeless tobacco.  Drug History:  reports that he does not use drugs.    Review of patient's allergies indicates:   Allergen Reactions    Morphine Nausea And Vomiting    Vicodin [hydrocodone-acetaminophen] Nausea And Vomiting       Current Outpatient Medications   Medication Sig Dispense Refill    aspirin (ECOTRIN) 81 MG EC tablet Take 81 mg by mouth once daily.      blood sugar diagnostic (ACCU-CHEK OLGA PLUS TEST STRP) Strp 1 each by Misc.(Non-Drug; Combo Route) route daily as needed. 100 each 6    carvedilol (COREG) 6.25 MG tablet Take 6.25 mg by mouth 2 (two) times daily.       ELIQUIS 2.5 mg Tab Take 2.5 mg by mouth 2 (two) times daily.        glipiZIDE (GLUCOTROL) 10 MG tablet Take 1 tablet (10 mg total) by mouth 2 (two) times daily with meals. 180 tablet 1    irbesartan-hydrochlorothiazide (AVALIDE) 300-12.5 mg per tablet Take 1 tablet by mouth once daily. 90 tablet 1    metFORMIN (GLUCOPHAGE) 1000 MG tablet Take 1 tablet (1,000 mg total) by mouth 2 (two) times daily with meals. 180 tablet 1    MULTIVITAMIN W-MINERALS/LUTEIN (CENTRUM SILVER ORAL) Take by mouth.      pravastatin (PRAVACHOL) 40 MG tablet Take 1 tablet (40 mg total) by mouth once daily. 90 tablet 1    SITagliptin (JANUVIA) 100 MG Tab Take 1 tablet (100 mg total) by mouth once daily. 90 tablet 1    alcohol swabs (BD ALCOHOL SWABS) PadM Apply 1 each topically as needed. 100 each 6    blood glucose control high,low (ACCU-CHEK OLGA CONTROL SOLN) Soln 1 each by Misc.(Non-Drug; Combo Route) route as directed. 1 each 11    blood sugar diagnostic Strp To check BG 2 times daily, to use with insurance preferred meter 100 strip 0    blood-glucose meter kit To check BG 1 times daily, to use with insurance preferred meter 1 each 0    lancets Misc To check BG 2 times daily, to use with insurance preferred meter 100 each 0     No current facility-administered medications for this visit.        Review of Systems   Constitutional: Negative for fatigue, fever and unexpected weight change.   HENT: Negative for ear pain, sinus pressure and sore throat.    Eyes: Negative for pain.   Respiratory: Negative for cough and shortness of breath.    Cardiovascular: Negative for chest pain and leg swelling.   Gastrointestinal: Negative for abdominal pain, constipation, nausea and vomiting.   Genitourinary: Negative for dysuria, frequency and urgency.   Musculoskeletal: Negative for arthralgias.   Skin: Negative for rash.   Neurological: Negative for dizziness, weakness and headaches.   Psychiatric/Behavioral: Negative for sleep disturbance.         Objective:        Physical Exam:   Physical Exam    Constitutional: He appears well-developed and well-nourished.            Assessment:       1. Type 2 diabetes mellitus without complication, unspecified whether long term insulin use    2. Hyperlipidemia with target low density lipoprotein (LDL) cholesterol less than 100 mg/dL    3. Coronary artery disease, angina presence unspecified, unspecified vessel or lesion type, unspecified whether native or transplanted heart    4. HTN (hypertension), benign    5. History of DVT (deep vein thrombosis)      Plan:   Type 2 diabetes mellitus without complication, unspecified whether long term insulin use  Comments:  will request samples. call in 1 week with readings.   Orders:  -     blood-glucose meter kit; To check BG 1 times daily, to use with insurance preferred meter  Dispense: 1 each; Refill: 0  -     lancets Misc; To check BG 2 times daily, to use with insurance preferred meter  Dispense: 100 each; Refill: 0  -     blood sugar diagnostic Strp; To check BG 2 times daily, to use with insurance preferred meter  Dispense: 100 strip; Refill: 0    Hyperlipidemia with target low density lipoprotein (LDL) cholesterol less than 100 mg/dL    Coronary artery disease, angina presence unspecified, unspecified vessel or lesion type, unspecified whether native or transplanted heart    HTN (hypertension), benign    History of DVT (deep vein thrombosis)      Follow up in about 3 months (around 7/9/2020) for Diabetic Check-Up.    Total time spent with patient: 15min    Each patient to whom he or she provides medical services by telemedicine is:  (1) informed of the relationship between the physician and patient and the respective role of any other health care provider with respect to management of the patient; and (2) notified that he or she may decline to receive medical services by telemedicine and may withdraw from such care at any time.    This note was created using [employer] voice recognition software that occasionally misinterprets  phrases or words.

## 2020-04-17 ENCOUNTER — TELEPHONE (OUTPATIENT)
Dept: FAMILY MEDICINE | Facility: CLINIC | Age: 70
End: 2020-04-17

## 2020-04-17 NOTE — TELEPHONE ENCOUNTER
Spoke with pt let him know we have ordered it we are waiting for it to come in and as soon as it does we will call him and let him know. Pt stated okay thank you.

## 2020-04-17 NOTE — TELEPHONE ENCOUNTER
----- Message from Lizzeth Rush sent at 4/17/2020 10:53 AM CDT -----  Contact: Carlos Manuel Levin   Pt would like to know if we have any Januvia samples?? Please give him a call back he has been waiting since his last week.   Pt# [phone]

## 2020-05-27 DIAGNOSIS — E78.2 MIXED HYPERLIPIDEMIA: ICD-10-CM

## 2020-05-27 RX ORDER — METFORMIN HYDROCHLORIDE 1000 MG/1
1000 TABLET ORAL 2 TIMES DAILY WITH MEALS
Qty: 180 TABLET | Refills: 1 | Status: SHIPPED | OUTPATIENT
Start: 2020-05-27 | End: 2020-08-17 | Stop reason: SDUPTHER

## 2020-05-27 RX ORDER — PRAVASTATIN SODIUM 40 MG/1
40 TABLET ORAL DAILY
Qty: 90 TABLET | Refills: 1 | Status: SHIPPED | OUTPATIENT
Start: 2020-05-27 | End: 2020-08-17 | Stop reason: SDUPTHER

## 2020-05-27 RX ORDER — GLIPIZIDE 10 MG/1
10 TABLET ORAL 2 TIMES DAILY WITH MEALS
Qty: 180 TABLET | Refills: 1 | Status: SHIPPED | OUTPATIENT
Start: 2020-05-27 | End: 2020-08-17 | Stop reason: SDUPTHER

## 2020-05-27 RX ORDER — CARVEDILOL 6.25 MG/1
6.25 TABLET ORAL 2 TIMES DAILY
Qty: 90 TABLET | Refills: 1 | Status: SHIPPED | OUTPATIENT
Start: 2020-05-27 | End: 2020-08-17 | Stop reason: SDUPTHER

## 2020-05-27 NOTE — TELEPHONE ENCOUNTER
----- Message from Jerod Sheth sent at 5/27/2020 11:33 AM CDT -----  Refills on metformin, glipizide,pravastatin,  carvedilol   Pharm [facility]   Pt [phone]

## 2020-06-17 ENCOUNTER — TELEPHONE (OUTPATIENT)
Dept: FAMILY MEDICINE | Facility: CLINIC | Age: 70
End: 2020-06-17

## 2020-06-17 DIAGNOSIS — N52.9 ERECTILE DYSFUNCTION, UNSPECIFIED ERECTILE DYSFUNCTION TYPE: Primary | ICD-10-CM

## 2020-06-17 RX ORDER — TADALAFIL 20 MG/1
20 TABLET ORAL DAILY PRN
Qty: 6 TABLET | Refills: 2 | Status: SHIPPED | OUTPATIENT
Start: 2020-06-17 | End: 2020-08-17 | Stop reason: SDUPTHER

## 2020-06-17 RX ORDER — TADALAFIL 20 MG/1
20 TABLET ORAL DAILY PRN
Qty: 30 TABLET | Refills: 1 | Status: SHIPPED | OUTPATIENT
Start: 2020-06-17 | End: 2020-06-17

## 2020-06-17 NOTE — TELEPHONE ENCOUNTER
Looked in ecw and he was prescribed Cialis 20mg prn about a year ago.    Rx set up if your ok to send.

## 2020-06-17 NOTE — TELEPHONE ENCOUNTER
Spoke to pt. He wants it to [facility] now because he doesn't think [facility] mail order will provide it and also worried about cost so he would like just 6 tabs called in with refill    Rx set up

## 2020-06-17 NOTE — TELEPHONE ENCOUNTER
----- Message from Heather Flynn MA sent at 6/17/2020  2:25 PM CDT -----  VM @ 1:27p - Pt called to advise that his pharmacy closes at 4:00pm, so he would like his Rx sent in before they close.    GUSTAVO Gary Staff  Caller: Unspecified (Today,  9:56 AM)         VM @ 9:32a - Pt left a message stating he called several weeks ago, requesting a new Rx for Cialis.  He stated he has taken in in the past and would like to begin taking it again.  Please contact pt to advise.     Pt - [phone]

## 2020-06-17 NOTE — TELEPHONE ENCOUNTER
----- Message from Heather Flynn MA sent at 6/17/2020  9:56 AM CDT -----  VM @ 9:32a - Pt left a message stating he called several weeks ago, requesting a new Rx for Cialis.  He stated he has taken in in the past and would like to begin taking it again.  Please contact pt to advise.    Pt - [phone]

## 2020-07-02 ENCOUNTER — TELEPHONE (OUTPATIENT)
Dept: FAMILY MEDICINE | Facility: CLINIC | Age: 70
End: 2020-07-02

## 2020-07-02 NOTE — TELEPHONE ENCOUNTER
----- Message from Lizzeth Rush sent at 7/2/2020  8:38 AM CDT -----  Regarding: Returning nurse call  Contact: Amadeo Levin  Pt is returning nurse Ty call in regards to his appt next week. But he says that he has to r/s his appt . He won't be back for the next couple weeks  because he's out of town. He would like to know can he do a virtual visit?   Pt# [phone]

## 2020-07-06 DIAGNOSIS — I10 ESSENTIAL HYPERTENSION: ICD-10-CM

## 2020-07-06 RX ORDER — IRBESARTAN AND HYDROCHLOROTHIAZIDE 300; 12.5 MG/1; MG/1
1 TABLET, FILM COATED ORAL DAILY
Qty: 90 TABLET | Refills: 1 | Status: SHIPPED | OUTPATIENT
Start: 2020-07-06 | End: 2020-08-17 | Stop reason: SDUPTHER

## 2020-07-06 NOTE — TELEPHONE ENCOUNTER
----- Message from Grace Arauz sent at 7/6/2020 12:39 PM CDT -----  Refill for Irbesartan-chtz 300-12.5 mg. [facility] mail [facility]. Pt #[phone]

## 2020-08-06 ENCOUNTER — TELEPHONE (OUTPATIENT)
Dept: FAMILY MEDICINE | Facility: CLINIC | Age: 70
End: 2020-08-06

## 2020-08-06 NOTE — TELEPHONE ENCOUNTER
----- Message from Azeb Irvin sent at 8/6/2020  2:47 PM CDT -----  The patient wants to know if he can change his apt to a virtual. Please call.   pt's # [phone] GH

## 2020-08-06 NOTE — TELEPHONE ENCOUNTER
Spoke to pt regarding message below. I was able to switch pts appt to virtual visit. Let pt know that he needs to have labs done before his visit

## 2020-08-17 ENCOUNTER — TELEPHONE (OUTPATIENT)
Dept: FAMILY MEDICINE | Facility: CLINIC | Age: 70
End: 2020-08-17

## 2020-08-17 LAB
ALBUMIN SERPL-MCNC: 4.5 G/DL (ref 3.6–5.1)
ALBUMIN/CREAT UR: 33 MCG/MG CREAT
ALBUMIN/GLOB SERPL: 1.7 (CALC) (ref 1–2.5)
ALP SERPL-CCNC: 63 U/L (ref 35–144)
ALT SERPL-CCNC: 20 U/L (ref 9–46)
APPEARANCE UR: CLEAR
AST SERPL-CCNC: 17 U/L (ref 10–35)
BACTERIA #/AREA URNS HPF: ABNORMAL /HPF
BACTERIA UR CULT: ABNORMAL
BASOPHILS # BLD AUTO: 79 CELLS/UL (ref 0–200)
BASOPHILS NFR BLD AUTO: 1.1 %
BILIRUB SERPL-MCNC: 0.6 MG/DL (ref 0.2–1.2)
BILIRUB UR QL STRIP: NEGATIVE
BUN SERPL-MCNC: 33 MG/DL (ref 7–25)
BUN/CREAT SERPL: 19 (CALC) (ref 6–22)
CALCIUM SERPL-MCNC: 9.7 MG/DL (ref 8.6–10.3)
CHLORIDE SERPL-SCNC: 106 MMOL/L (ref 98–110)
CHOLEST SERPL-MCNC: 201 MG/DL
CHOLEST/HDLC SERPL: 5.7 (CALC)
CO2 SERPL-SCNC: 25 MMOL/L (ref 20–32)
COLOR UR: YELLOW
CREAT SERPL-MCNC: 1.73 MG/DL (ref 0.7–1.18)
CREAT UR-MCNC: 132 MG/DL (ref 20–320)
EOSINOPHIL # BLD AUTO: 230 CELLS/UL (ref 15–500)
EOSINOPHIL NFR BLD AUTO: 3.2 %
ERYTHROCYTE [DISTWIDTH] IN BLOOD BY AUTOMATED COUNT: 12.7 % (ref 11–15)
GFRSERPLBLD MDRD-ARVRAT: 39 ML/MIN/1.73M2
GLOBULIN SER CALC-MCNC: 2.7 G/DL (CALC) (ref 1.9–3.7)
GLUCOSE SERPL-MCNC: 206 MG/DL (ref 65–99)
GLUCOSE UR QL STRIP: ABNORMAL
HBA1C MFR BLD: 9.7 % OF TOTAL HGB
HCT VFR BLD AUTO: 38.6 % (ref 38.5–50)
HDLC SERPL-MCNC: 35 MG/DL
HGB BLD-MCNC: 13.1 G/DL (ref 13.2–17.1)
HGB UR QL STRIP: NEGATIVE
HYALINE CASTS #/AREA URNS LPF: ABNORMAL /LPF
KETONES UR QL STRIP: NEGATIVE
LDLC SERPL CALC-MCNC: 135 MG/DL (CALC)
LEUKOCYTE ESTERASE UR QL STRIP: NEGATIVE
LYMPHOCYTES # BLD AUTO: 1706 CELLS/UL (ref 850–3900)
LYMPHOCYTES NFR BLD AUTO: 23.7 %
MCH RBC QN AUTO: 31.8 PG (ref 27–33)
MCHC RBC AUTO-ENTMCNC: 33.9 G/DL (ref 32–36)
MCV RBC AUTO: 93.7 FL (ref 80–100)
MICROALBUMIN UR-MCNC: 4.3 MG/DL
MONOCYTES # BLD AUTO: 576 CELLS/UL (ref 200–950)
MONOCYTES NFR BLD AUTO: 8 %
NEUTROPHILS # BLD AUTO: 4608 CELLS/UL (ref 1500–7800)
NEUTROPHILS NFR BLD AUTO: 64 %
NITRITE UR QL STRIP: NEGATIVE
NONHDLC SERPL-MCNC: 166 MG/DL (CALC)
PH UR STRIP: ABNORMAL [PH] (ref 5–8)
PLATELET # BLD AUTO: 218 THOUSAND/UL (ref 140–400)
PMV BLD REES-ECKER: 10.9 FL (ref 7.5–12.5)
POTASSIUM SERPL-SCNC: 5.1 MMOL/L (ref 3.5–5.3)
PROT SERPL-MCNC: 7.2 G/DL (ref 6.1–8.1)
PROT UR QL STRIP: ABNORMAL
PSA SERPL-MCNC: 1.2 NG/ML
RBC # BLD AUTO: 4.12 MILLION/UL (ref 4.2–5.8)
RBC #/AREA URNS HPF: ABNORMAL /HPF
SODIUM SERPL-SCNC: 141 MMOL/L (ref 135–146)
SP GR UR STRIP: 1.02 (ref 1–1.03)
SQUAMOUS #/AREA URNS HPF: ABNORMAL /HPF
TRIGL SERPL-MCNC: 170 MG/DL
TSH SERPL-ACNC: 1.3 MIU/L (ref 0.4–4.5)
WBC # BLD AUTO: 7.2 THOUSAND/UL (ref 3.8–10.8)
WBC #/AREA URNS HPF: ABNORMAL /HPF

## 2020-08-19 ENCOUNTER — OFFICE VISIT (OUTPATIENT)
Dept: FAMILY MEDICINE | Facility: CLINIC | Age: 70
End: 2020-08-19
Payer: MEDICARE

## 2020-08-19 DIAGNOSIS — Z79.899 HIGH RISK MEDICATION USE: Primary | ICD-10-CM

## 2020-08-19 DIAGNOSIS — N52.9 ERECTILE DYSFUNCTION, UNSPECIFIED ERECTILE DYSFUNCTION TYPE: ICD-10-CM

## 2020-08-19 DIAGNOSIS — I25.10 CORONARY ARTERY DISEASE, ANGINA PRESENCE UNSPECIFIED, UNSPECIFIED VESSEL OR LESION TYPE, UNSPECIFIED WHETHER NATIVE OR TRANSPLANTED HEART: ICD-10-CM

## 2020-08-19 DIAGNOSIS — I10 ESSENTIAL HYPERTENSION: ICD-10-CM

## 2020-08-19 DIAGNOSIS — E78.2 MIXED HYPERLIPIDEMIA: ICD-10-CM

## 2020-08-19 PROCEDURE — 99214 OFFICE O/P EST MOD 30 MIN: CPT | Mod: 95,,, | Performed by: NURSE PRACTITIONER

## 2020-08-19 PROCEDURE — 3046F HEMOGLOBIN A1C LEVEL >9.0%: CPT | Mod: 95,,, | Performed by: NURSE PRACTITIONER

## 2020-08-19 PROCEDURE — 1101F PR PT FALLS ASSESS DOC 0-1 FALLS W/OUT INJ PAST YR: ICD-10-PCS | Mod: 95,,, | Performed by: NURSE PRACTITIONER

## 2020-08-19 PROCEDURE — 1101F PT FALLS ASSESS-DOCD LE1/YR: CPT | Mod: 95,,, | Performed by: NURSE PRACTITIONER

## 2020-08-19 PROCEDURE — 1159F PR MEDICATION LIST DOCUMENTED IN MEDICAL RECORD: ICD-10-PCS | Mod: 95,,, | Performed by: NURSE PRACTITIONER

## 2020-08-19 PROCEDURE — 1159F MED LIST DOCD IN RCRD: CPT | Mod: 95,,, | Performed by: NURSE PRACTITIONER

## 2020-08-19 PROCEDURE — 99214 PR OFFICE/OUTPT VISIT, EST, LEVL IV, 30-39 MIN: ICD-10-PCS | Mod: 95,,, | Performed by: NURSE PRACTITIONER

## 2020-08-19 PROCEDURE — 3046F PR MOST RECENT HEMOGLOBIN A1C LEVEL > 9.0%: ICD-10-PCS | Mod: 95,,, | Performed by: NURSE PRACTITIONER

## 2020-08-19 RX ORDER — METFORMIN HYDROCHLORIDE 1000 MG/1
1000 TABLET ORAL 2 TIMES DAILY WITH MEALS
Qty: 180 TABLET | Refills: 1 | Status: SHIPPED | OUTPATIENT
Start: 2020-08-19 | End: 2021-01-27 | Stop reason: SDUPTHER

## 2020-08-19 RX ORDER — TADALAFIL 20 MG/1
20 TABLET ORAL DAILY PRN
Qty: 6 TABLET | Refills: 2 | Status: ON HOLD | OUTPATIENT
Start: 2020-08-19 | End: 2021-12-01 | Stop reason: HOSPADM

## 2020-08-19 RX ORDER — CARVEDILOL 6.25 MG/1
6.25 TABLET ORAL 2 TIMES DAILY
Qty: 90 TABLET | Refills: 1 | Status: SHIPPED | OUTPATIENT
Start: 2020-08-19 | End: 2021-01-27 | Stop reason: SDUPTHER

## 2020-08-19 RX ORDER — PRAVASTATIN SODIUM 40 MG/1
40 TABLET ORAL DAILY
Qty: 90 TABLET | Refills: 1 | Status: SHIPPED | OUTPATIENT
Start: 2020-08-19 | End: 2021-01-27 | Stop reason: SDUPTHER

## 2020-08-19 RX ORDER — IRBESARTAN AND HYDROCHLOROTHIAZIDE 300; 12.5 MG/1; MG/1
1 TABLET, FILM COATED ORAL DAILY
Qty: 90 TABLET | Refills: 1 | Status: SHIPPED | OUTPATIENT
Start: 2020-08-19 | End: 2021-01-27 | Stop reason: SDUPTHER

## 2020-08-19 RX ORDER — GLIPIZIDE 10 MG/1
10 TABLET ORAL 2 TIMES DAILY WITH MEALS
Qty: 180 TABLET | Refills: 1 | Status: SHIPPED | OUTPATIENT
Start: 2020-08-19 | End: 2021-01-27 | Stop reason: SDUPTHER

## 2020-08-19 NOTE — PROGRESS NOTES
Subjective:        The chief complaint leading to consultation is: check up  The patient location is:  Home  Visit type: Virtual visit with synchronous audio/video or audio only  This was a video visit in lieu of in-person visit due to the coronavirus emergency. Patient acknowledged and consented to the video visit encounter.     69-year-old male presents for virtual visit.  Patient is treated for diabetes hypertension and hyperlipidemia.  Patient Mets to not checking sugar regularly and not watching diet.  Does have donut and coffee in most mornings with his friends.  He does stay busy.  Active around the [place] and does do some neighborhood grass cutting.  Sleeps well.  Appetite is too good according to patient.  Would like to discuss recent labs.      Past Surgical History:   Procedure Laterality Date    APPENDECTOMY      HERNIA REPAIR      stents       Past Medical History:   Diagnosis Date    Coronary artery disease     Diabetes mellitus type II     Hypertension      Family History   Problem Relation Age of Onset    Diabetes Mother     Cancer Neg Hx     Macular degeneration Neg Hx     Retinal detachment Neg Hx     Glaucoma Neg Hx         Social History:   Marital Status:   Alcohol History:  reports current alcohol use.  Tobacco History:  reports that he has never smoked. He has never used smokeless tobacco.  Drug History:  reports no history of drug use.    Review of patient's allergies indicates:   Allergen Reactions    Morphine Nausea And Vomiting    Vicodin [hydrocodone-acetaminophen] Nausea And Vomiting       Current Outpatient Medications   Medication Sig Dispense Refill    alcohol swabs (BD ALCOHOL SWABS) PadM Apply 1 each topically as needed. 100 each 6    aspirin (ECOTRIN) 81 MG EC tablet Take 81 mg by mouth once daily.      blood glucose control high,low (ACCU-CHEK OLGA CONTROL SOLN) Soln 1 each by Misc.(Non-Drug; Combo Route) route as directed. 1 each 11    blood sugar  diagnostic (ACCU-CHEK OLGA PLUS TEST STRP) Strp 1 each by Misc.(Non-Drug; Combo Route) route daily as needed. 100 each 6    blood sugar diagnostic Strp To check BG 2 times daily, to use with insurance preferred meter 100 strip 0    blood-glucose meter kit To check BG 1 times daily, to use with insurance preferred meter 1 each 0    carvediloL (COREG) 6.25 MG tablet Take 1 tablet (6.25 mg total) by mouth 2 (two) times daily. 90 tablet 1    ELIQUIS 2.5 mg Tab Take 2.5 mg by mouth 2 (two) times daily.       glipiZIDE (GLUCOTROL) 10 MG tablet Take 1 tablet (10 mg total) by mouth 2 (two) times daily with meals. 180 tablet 1    irbesartan-hydrochlorothiazide (AVALIDE) 300-12.5 mg per tablet Take 1 tablet by mouth once daily. 90 tablet 1    lancets Misc To check BG 2 times daily, to use with insurance preferred meter 100 each 0    metFORMIN (GLUCOPHAGE) 1000 MG tablet Take 1 tablet (1,000 mg total) by mouth 2 (two) times daily with meals. 180 tablet 1    MULTIVITAMIN W-MINERALS/LUTEIN (CENTRUM SILVER ORAL) Take by mouth.      pravastatin (PRAVACHOL) 40 MG tablet Take 1 tablet (40 mg total) by mouth once daily. 90 tablet 1    SITagliptin (JANUVIA) 100 MG Tab Take 1 tablet (100 mg total) by mouth once daily. 90 tablet 1    tadalafiL (CIALIS) 20 MG Tab Take 1 tablet (20 mg total) by mouth daily as needed. 6 tablet 2     No current facility-administered medications for this visit.        Review of Systems   Constitutional: Negative for fatigue, fever and unexpected weight change.   HENT: Negative for ear pain, sinus pressure and sore throat.    Eyes: Negative for pain.   Respiratory: Negative for cough and shortness of breath.    Cardiovascular: Negative for chest pain and leg swelling.   Gastrointestinal: Negative for abdominal pain, constipation, nausea and vomiting.   Genitourinary: Negative for dysuria, frequency and urgency.   Musculoskeletal: Negative for arthralgias.   Skin: Negative for rash.    Neurological: Negative for dizziness, weakness and headaches.   Psychiatric/Behavioral: Negative for sleep disturbance.         Objective:        Physical Exam:   Physical Exam  Constitutional:       Appearance: Normal appearance.   Neurological:      Mental Status: He is alert.              Assessment:       1. High risk medication use    2. Essential hypertension    3. Uncontrolled type 2 diabetes mellitus with stage 3 chronic kidney disease, without long-term current use of insulin    4. Erectile dysfunction, unspecified erectile dysfunction type    5. Mixed hyperlipidemia    6. Coronary artery disease, angina presence unspecified, unspecified vessel or lesion type, unspecified whether native or transplanted heart      Plan:   High risk medication use    Essential hypertension  -     irbesartan-hydrochlorothiazide (AVALIDE) 300-12.5 mg per tablet; Take 1 tablet by mouth once daily.  Dispense: 90 tablet; Refill: 1    Uncontrolled type 2 diabetes mellitus with stage 3 chronic kidney disease, without long-term current use of insulin  Comments:  Discussed following ADA diet  Orders:  -     metFORMIN (GLUCOPHAGE) 1000 MG tablet; Take 1 tablet (1,000 mg total) by mouth 2 (two) times daily with meals.  Dispense: 180 tablet; Refill: 1  -     glipiZIDE (GLUCOTROL) 10 MG tablet; Take 1 tablet (10 mg total) by mouth 2 (two) times daily with meals.  Dispense: 180 tablet; Refill: 1  -     SITagliptin (JANUVIA) 100 MG Tab; Take 1 tablet (100 mg total) by mouth once daily.  Dispense: 90 tablet; Refill: 1    Erectile dysfunction, unspecified erectile dysfunction type  -     tadalafiL (CIALIS) 20 MG Tab; Take 1 tablet (20 mg total) by mouth daily as needed.  Dispense: 6 tablet; Refill: 2    Mixed hyperlipidemia  -     pravastatin (PRAVACHOL) 40 MG tablet; Take 1 tablet (40 mg total) by mouth once daily.  Dispense: 90 tablet; Refill: 1    Coronary artery disease, angina presence unspecified, unspecified vessel or lesion type,  unspecified whether native or transplanted heart    Other orders  -     carvediloL (COREG) 6.25 MG tablet; Take 1 tablet (6.25 mg total) by mouth 2 (two) times daily.  Dispense: 90 tablet; Refill: 1      Follow up in about 3 months (around 11/19/2020) for Diabetic Check-Up.    Total time spent with patient: 30min    Each patient to whom he or she provides medical services by telemedicine is:  (1) informed of the relationship between the physician and patient and the respective role of any other health care provider with respect to management of the patient; and (2) notified that he or she may decline to receive medical services by telemedicine and may withdraw from such care at any time.    This note was created using [facility] voice recognition software that occasionally misinterprets phrases or words.

## 2020-10-08 ENCOUNTER — TELEPHONE (OUTPATIENT)
Dept: FAMILY MEDICINE | Facility: CLINIC | Age: 70
End: 2020-10-08

## 2020-10-08 NOTE — TELEPHONE ENCOUNTER
----- Message from Jerod Sheth sent at 10/8/2020  2:40 PM CDT -----  Regarding: needing call back  Pt is needing a callback about bp med   Pt [phone]

## 2020-10-23 NOTE — PROGRESS NOTES
Pre-Visit Chart Review  For Appointment Scheduled on 11/26/18    Health Maintenance Due   Topic Date Due    TETANUS VACCINE  11/16/1966    Colonoscopy  11/16/1998    Zoster Vaccine  11/16/2008    Eye Exam  06/16/2016    Hemoglobin A1c  04/04/2018    Foot Exam  06/16/2018    Influenza Vaccine  08/01/2018                      chest pain

## 2021-01-12 ENCOUNTER — TELEPHONE (OUTPATIENT)
Dept: FAMILY MEDICINE | Facility: CLINIC | Age: 71
End: 2021-01-12

## 2021-01-12 DIAGNOSIS — Z79.899 ENCOUNTER FOR LONG-TERM (CURRENT) USE OF OTHER MEDICATIONS: Primary | ICD-10-CM

## 2021-01-27 DIAGNOSIS — E78.2 MIXED HYPERLIPIDEMIA: ICD-10-CM

## 2021-01-27 DIAGNOSIS — I10 ESSENTIAL HYPERTENSION: ICD-10-CM

## 2021-01-27 LAB
ALBUMIN SERPL-MCNC: 4.1 G/DL (ref 3.6–5.1)
ALBUMIN/CREAT UR: 34 MCG/MG CREAT
ALBUMIN/GLOB SERPL: 1.6 (CALC) (ref 1–2.5)
ALP SERPL-CCNC: 54 U/L (ref 35–144)
ALT SERPL-CCNC: 23 U/L (ref 9–46)
APPEARANCE UR: CLEAR
AST SERPL-CCNC: 16 U/L (ref 10–35)
BACTERIA #/AREA URNS HPF: ABNORMAL /HPF
BACTERIA UR CULT: ABNORMAL
BASOPHILS # BLD AUTO: 60 CELLS/UL (ref 0–200)
BASOPHILS NFR BLD AUTO: 1 %
BILIRUB SERPL-MCNC: 0.6 MG/DL (ref 0.2–1.2)
BILIRUB UR QL STRIP: NEGATIVE
BUN SERPL-MCNC: 33 MG/DL (ref 7–25)
BUN/CREAT SERPL: 20 (CALC) (ref 6–22)
CALCIUM SERPL-MCNC: 9.3 MG/DL (ref 8.6–10.3)
CHLORIDE SERPL-SCNC: 104 MMOL/L (ref 98–110)
CHOLEST SERPL-MCNC: 238 MG/DL
CHOLEST/HDLC SERPL: 6.4 (CALC)
CO2 SERPL-SCNC: 23 MMOL/L (ref 20–32)
COLOR UR: YELLOW
CREAT SERPL-MCNC: 1.66 MG/DL (ref 0.7–1.18)
CREAT UR-MCNC: 92 MG/DL (ref 20–320)
EOSINOPHIL # BLD AUTO: 132 CELLS/UL (ref 15–500)
EOSINOPHIL NFR BLD AUTO: 2.2 %
ERYTHROCYTE [DISTWIDTH] IN BLOOD BY AUTOMATED COUNT: 13.2 % (ref 11–15)
GFRSERPLBLD MDRD-ARVRAT: 41 ML/MIN/1.73M2
GLOBULIN SER CALC-MCNC: 2.5 G/DL (CALC) (ref 1.9–3.7)
GLUCOSE SERPL-MCNC: 296 MG/DL (ref 65–99)
GLUCOSE UR QL STRIP: ABNORMAL
HCT VFR BLD AUTO: 38.8 % (ref 38.5–50)
HDLC SERPL-MCNC: 37 MG/DL
HGB BLD-MCNC: 12.4 G/DL (ref 13.2–17.1)
HGB UR QL STRIP: NEGATIVE
HYALINE CASTS #/AREA URNS LPF: ABNORMAL /LPF
KETONES UR QL STRIP: NEGATIVE
LDLC SERPL CALC-MCNC: 161 MG/DL (CALC)
LEUKOCYTE ESTERASE UR QL STRIP: NEGATIVE
LYMPHOCYTES # BLD AUTO: 1572 CELLS/UL (ref 850–3900)
LYMPHOCYTES NFR BLD AUTO: 26.2 %
MCH RBC QN AUTO: 30.1 PG (ref 27–33)
MCHC RBC AUTO-ENTMCNC: 32 G/DL (ref 32–36)
MCV RBC AUTO: 94.2 FL (ref 80–100)
MICROALBUMIN UR-MCNC: 3.1 MG/DL
MONOCYTES # BLD AUTO: 402 CELLS/UL (ref 200–950)
MONOCYTES NFR BLD AUTO: 6.7 %
NEUTROPHILS # BLD AUTO: 3834 CELLS/UL (ref 1500–7800)
NEUTROPHILS NFR BLD AUTO: 63.9 %
NITRITE UR QL STRIP: NEGATIVE
NONHDLC SERPL-MCNC: 201 MG/DL (CALC)
PH UR STRIP: 6.5 [PH] (ref 5–8)
PLATELET # BLD AUTO: 208 THOUSAND/UL (ref 140–400)
PMV BLD REES-ECKER: 10.8 FL (ref 7.5–12.5)
POTASSIUM SERPL-SCNC: 4.6 MMOL/L (ref 3.5–5.3)
PROT SERPL-MCNC: 6.6 G/DL (ref 6.1–8.1)
PROT UR QL STRIP: NEGATIVE
RBC # BLD AUTO: 4.12 MILLION/UL (ref 4.2–5.8)
RBC #/AREA URNS HPF: ABNORMAL /HPF
SODIUM SERPL-SCNC: 139 MMOL/L (ref 135–146)
SP GR UR STRIP: 1.02 (ref 1–1.03)
SQUAMOUS #/AREA URNS HPF: ABNORMAL /HPF
TRIGL SERPL-MCNC: 235 MG/DL
TSH SERPL-ACNC: 1.51 MIU/L (ref 0.4–4.5)
WBC # BLD AUTO: 6 THOUSAND/UL (ref 3.8–10.8)
WBC #/AREA URNS HPF: ABNORMAL /HPF

## 2021-01-27 RX ORDER — PRAVASTATIN SODIUM 40 MG/1
40 TABLET ORAL DAILY
Qty: 90 TABLET | Refills: 1 | Status: SHIPPED | OUTPATIENT
Start: 2021-01-27 | End: 2021-07-27 | Stop reason: SDUPTHER

## 2021-01-27 RX ORDER — GLIPIZIDE 10 MG/1
10 TABLET ORAL 2 TIMES DAILY WITH MEALS
Qty: 180 TABLET | Refills: 1 | Status: SHIPPED | OUTPATIENT
Start: 2021-01-27 | End: 2021-07-27 | Stop reason: SDUPTHER

## 2021-01-27 RX ORDER — METFORMIN HYDROCHLORIDE 1000 MG/1
1000 TABLET ORAL 2 TIMES DAILY WITH MEALS
Qty: 180 TABLET | Refills: 1 | Status: SHIPPED | OUTPATIENT
Start: 2021-01-27 | End: 2021-07-27 | Stop reason: SDUPTHER

## 2021-01-27 RX ORDER — IRBESARTAN AND HYDROCHLOROTHIAZIDE 300; 12.5 MG/1; MG/1
1 TABLET, FILM COATED ORAL DAILY
Qty: 90 TABLET | Refills: 1 | Status: SHIPPED | OUTPATIENT
Start: 2021-01-27 | End: 2021-07-27 | Stop reason: SDUPTHER

## 2021-01-27 RX ORDER — CARVEDILOL 6.25 MG/1
6.25 TABLET ORAL 2 TIMES DAILY
Qty: 180 TABLET | Refills: 1 | Status: SHIPPED | OUTPATIENT
Start: 2021-01-27 | End: 2021-07-27 | Stop reason: SDUPTHER

## 2021-01-28 ENCOUNTER — TELEPHONE (OUTPATIENT)
Dept: FAMILY MEDICINE | Facility: CLINIC | Age: 71
End: 2021-01-28

## 2021-02-25 ENCOUNTER — OFFICE VISIT (OUTPATIENT)
Dept: FAMILY MEDICINE | Facility: CLINIC | Age: 71
End: 2021-02-25
Payer: MEDICARE

## 2021-02-25 VITALS
HEIGHT: 68 IN | WEIGHT: 226 LBS | SYSTOLIC BLOOD PRESSURE: 152 MMHG | BODY MASS INDEX: 34.25 KG/M2 | HEART RATE: 68 BPM | DIASTOLIC BLOOD PRESSURE: 78 MMHG

## 2021-02-25 DIAGNOSIS — I10 ESSENTIAL HYPERTENSION: ICD-10-CM

## 2021-02-25 DIAGNOSIS — Z79.899 ENCOUNTER FOR LONG-TERM (CURRENT) USE OF OTHER MEDICATIONS: ICD-10-CM

## 2021-02-25 DIAGNOSIS — I10 HTN (HYPERTENSION), BENIGN: ICD-10-CM

## 2021-02-25 DIAGNOSIS — Z79.899 HIGH RISK MEDICATION USE: ICD-10-CM

## 2021-02-25 DIAGNOSIS — N52.9 ERECTILE DYSFUNCTION, UNSPECIFIED ERECTILE DYSFUNCTION TYPE: ICD-10-CM

## 2021-02-25 DIAGNOSIS — I25.10 CORONARY ARTERY DISEASE, ANGINA PRESENCE UNSPECIFIED, UNSPECIFIED VESSEL OR LESION TYPE, UNSPECIFIED WHETHER NATIVE OR TRANSPLANTED HEART: ICD-10-CM

## 2021-02-25 DIAGNOSIS — E11.9 TYPE 2 DIABETES MELLITUS WITHOUT COMPLICATION, UNSPECIFIED WHETHER LONG TERM INSULIN USE: ICD-10-CM

## 2021-02-25 DIAGNOSIS — E78.5 HYPERLIPIDEMIA WITH TARGET LOW DENSITY LIPOPROTEIN (LDL) CHOLESTEROL LESS THAN 100 MG/DL: ICD-10-CM

## 2021-02-25 DIAGNOSIS — E78.2 MIXED HYPERLIPIDEMIA: ICD-10-CM

## 2021-02-25 LAB — HBA1C MFR BLD: 11.3 %

## 2021-02-25 PROCEDURE — 3288F FALL RISK ASSESSMENT DOCD: CPT | Mod: S$GLB,,, | Performed by: NURSE PRACTITIONER

## 2021-02-25 PROCEDURE — 3288F PR FALLS RISK ASSESSMENT DOCUMENTED: ICD-10-PCS | Mod: S$GLB,,, | Performed by: NURSE PRACTITIONER

## 2021-02-25 PROCEDURE — 1101F PT FALLS ASSESS-DOCD LE1/YR: CPT | Mod: S$GLB,,, | Performed by: NURSE PRACTITIONER

## 2021-02-25 PROCEDURE — 3077F PR MOST RECENT SYSTOLIC BLOOD PRESSURE >= 140 MM HG: ICD-10-PCS | Mod: S$GLB,,, | Performed by: NURSE PRACTITIONER

## 2021-02-25 PROCEDURE — 1159F PR MEDICATION LIST DOCUMENTED IN MEDICAL RECORD: ICD-10-PCS | Mod: S$GLB,,, | Performed by: NURSE PRACTITIONER

## 2021-02-25 PROCEDURE — 3078F DIAST BP <80 MM HG: CPT | Mod: S$GLB,,, | Performed by: NURSE PRACTITIONER

## 2021-02-25 PROCEDURE — 83036 POCT HEMOGLOBIN A1C: ICD-10-PCS | Mod: QW,,, | Performed by: NURSE PRACTITIONER

## 2021-02-25 PROCEDURE — 99214 PR OFFICE/OUTPT VISIT, EST, LEVL IV, 30-39 MIN: ICD-10-PCS | Mod: S$GLB,,, | Performed by: NURSE PRACTITIONER

## 2021-02-25 PROCEDURE — 1101F PR PT FALLS ASSESS DOC 0-1 FALLS W/OUT INJ PAST YR: ICD-10-PCS | Mod: S$GLB,,, | Performed by: NURSE PRACTITIONER

## 2021-02-25 PROCEDURE — 3008F BODY MASS INDEX DOCD: CPT | Mod: S$GLB,,, | Performed by: NURSE PRACTITIONER

## 2021-02-25 PROCEDURE — 3046F PR MOST RECENT HEMOGLOBIN A1C LEVEL > 9.0%: ICD-10-PCS | Mod: S$GLB,,, | Performed by: NURSE PRACTITIONER

## 2021-02-25 PROCEDURE — 3008F PR BODY MASS INDEX (BMI) DOCUMENTED: ICD-10-PCS | Mod: S$GLB,,, | Performed by: NURSE PRACTITIONER

## 2021-02-25 PROCEDURE — 83036 HEMOGLOBIN GLYCOSYLATED A1C: CPT | Mod: QW,,, | Performed by: NURSE PRACTITIONER

## 2021-02-25 PROCEDURE — 3077F SYST BP >= 140 MM HG: CPT | Mod: S$GLB,,, | Performed by: NURSE PRACTITIONER

## 2021-02-25 PROCEDURE — 1159F MED LIST DOCD IN RCRD: CPT | Mod: S$GLB,,, | Performed by: NURSE PRACTITIONER

## 2021-02-25 PROCEDURE — 3078F PR MOST RECENT DIASTOLIC BLOOD PRESSURE < 80 MM HG: ICD-10-PCS | Mod: S$GLB,,, | Performed by: NURSE PRACTITIONER

## 2021-02-25 PROCEDURE — 3046F HEMOGLOBIN A1C LEVEL >9.0%: CPT | Mod: S$GLB,,, | Performed by: NURSE PRACTITIONER

## 2021-02-25 PROCEDURE — 99214 OFFICE O/P EST MOD 30 MIN: CPT | Mod: S$GLB,,, | Performed by: NURSE PRACTITIONER

## 2021-03-01 DIAGNOSIS — E11.9 TYPE 2 DIABETES MELLITUS WITHOUT COMPLICATION, UNSPECIFIED WHETHER LONG TERM INSULIN USE: ICD-10-CM

## 2021-03-01 RX ORDER — INSULIN PUMP SYRINGE, 3 ML
EACH MISCELLANEOUS
Qty: 1 EACH | Refills: 0 | Status: SHIPPED | OUTPATIENT
Start: 2021-03-01 | End: 2021-12-13

## 2021-03-04 ENCOUNTER — TELEPHONE (OUTPATIENT)
Dept: FAMILY MEDICINE | Facility: CLINIC | Age: 71
End: 2021-03-04

## 2021-03-30 ENCOUNTER — OFFICE VISIT (OUTPATIENT)
Dept: FAMILY MEDICINE | Facility: CLINIC | Age: 71
End: 2021-03-30
Payer: MEDICARE

## 2021-03-30 VITALS
BODY MASS INDEX: 33.91 KG/M2 | SYSTOLIC BLOOD PRESSURE: 132 MMHG | WEIGHT: 223 LBS | DIASTOLIC BLOOD PRESSURE: 86 MMHG | HEART RATE: 78 BPM

## 2021-03-30 DIAGNOSIS — I25.10 CORONARY ARTERY DISEASE, ANGINA PRESENCE UNSPECIFIED, UNSPECIFIED VESSEL OR LESION TYPE, UNSPECIFIED WHETHER NATIVE OR TRANSPLANTED HEART: ICD-10-CM

## 2021-03-30 DIAGNOSIS — E78.5 HYPERLIPIDEMIA WITH TARGET LOW DENSITY LIPOPROTEIN (LDL) CHOLESTEROL LESS THAN 100 MG/DL: ICD-10-CM

## 2021-03-30 DIAGNOSIS — Z79.899 HIGH RISK MEDICATION USE: ICD-10-CM

## 2021-03-30 DIAGNOSIS — I10 HTN (HYPERTENSION), BENIGN: ICD-10-CM

## 2021-03-30 LAB — HBA1C MFR BLD: 8.7 %

## 2021-03-30 PROCEDURE — 3008F BODY MASS INDEX DOCD: CPT | Mod: S$GLB,,, | Performed by: NURSE PRACTITIONER

## 2021-03-30 PROCEDURE — 83036 HEMOGLOBIN GLYCOSYLATED A1C: CPT | Mod: QW,,, | Performed by: NURSE PRACTITIONER

## 2021-03-30 PROCEDURE — 1159F PR MEDICATION LIST DOCUMENTED IN MEDICAL RECORD: ICD-10-PCS | Mod: S$GLB,,, | Performed by: NURSE PRACTITIONER

## 2021-03-30 PROCEDURE — 3052F HG A1C>EQUAL 8.0%<EQUAL 9.0%: CPT | Mod: S$GLB,,, | Performed by: NURSE PRACTITIONER

## 2021-03-30 PROCEDURE — 1101F PT FALLS ASSESS-DOCD LE1/YR: CPT | Mod: S$GLB,,, | Performed by: NURSE PRACTITIONER

## 2021-03-30 PROCEDURE — 3079F DIAST BP 80-89 MM HG: CPT | Mod: S$GLB,,, | Performed by: NURSE PRACTITIONER

## 2021-03-30 PROCEDURE — 3079F PR MOST RECENT DIASTOLIC BLOOD PRESSURE 80-89 MM HG: ICD-10-PCS | Mod: S$GLB,,, | Performed by: NURSE PRACTITIONER

## 2021-03-30 PROCEDURE — 3288F PR FALLS RISK ASSESSMENT DOCUMENTED: ICD-10-PCS | Mod: S$GLB,,, | Performed by: NURSE PRACTITIONER

## 2021-03-30 PROCEDURE — 3052F PR MOST RECENT HEMOGLOBIN A1C LEVEL 8.0 - < 9.0%: ICD-10-PCS | Mod: S$GLB,,, | Performed by: NURSE PRACTITIONER

## 2021-03-30 PROCEDURE — 1101F PR PT FALLS ASSESS DOC 0-1 FALLS W/OUT INJ PAST YR: ICD-10-PCS | Mod: S$GLB,,, | Performed by: NURSE PRACTITIONER

## 2021-03-30 PROCEDURE — 3008F PR BODY MASS INDEX (BMI) DOCUMENTED: ICD-10-PCS | Mod: S$GLB,,, | Performed by: NURSE PRACTITIONER

## 2021-03-30 PROCEDURE — 99214 OFFICE O/P EST MOD 30 MIN: CPT | Mod: S$GLB,,, | Performed by: NURSE PRACTITIONER

## 2021-03-30 PROCEDURE — 3075F SYST BP GE 130 - 139MM HG: CPT | Mod: S$GLB,,, | Performed by: NURSE PRACTITIONER

## 2021-03-30 PROCEDURE — 3075F PR MOST RECENT SYSTOLIC BLOOD PRESS GE 130-139MM HG: ICD-10-PCS | Mod: S$GLB,,, | Performed by: NURSE PRACTITIONER

## 2021-03-30 PROCEDURE — 1159F MED LIST DOCD IN RCRD: CPT | Mod: S$GLB,,, | Performed by: NURSE PRACTITIONER

## 2021-03-30 PROCEDURE — 3288F FALL RISK ASSESSMENT DOCD: CPT | Mod: S$GLB,,, | Performed by: NURSE PRACTITIONER

## 2021-03-30 PROCEDURE — 83036 POCT HEMOGLOBIN A1C: ICD-10-PCS | Mod: QW,,, | Performed by: NURSE PRACTITIONER

## 2021-03-30 PROCEDURE — 99214 PR OFFICE/OUTPT VISIT, EST, LEVL IV, 30-39 MIN: ICD-10-PCS | Mod: S$GLB,,, | Performed by: NURSE PRACTITIONER

## 2021-04-26 ENCOUNTER — TELEPHONE (OUTPATIENT)
Dept: FAMILY MEDICINE | Facility: CLINIC | Age: 71
End: 2021-04-26

## 2021-05-06 RX ORDER — SEMAGLUTIDE 1.34 MG/ML
0.5 INJECTION, SOLUTION SUBCUTANEOUS
Qty: 2 PEN | Refills: 0 | Status: SHIPPED | OUTPATIENT
Start: 2021-05-06 | End: 2021-07-05

## 2021-05-06 RX ORDER — SEMAGLUTIDE 1.34 MG/ML
INJECTION, SOLUTION SUBCUTANEOUS
COMMUNITY
End: 2021-05-06 | Stop reason: SDUPTHER

## 2021-06-01 ENCOUNTER — TELEPHONE (OUTPATIENT)
Dept: FAMILY MEDICINE | Facility: CLINIC | Age: 71
End: 2021-06-01

## 2021-06-01 DIAGNOSIS — Z12.11 SPECIAL SCREENING FOR MALIGNANT NEOPLASMS, COLON: Primary | ICD-10-CM

## 2021-06-01 DIAGNOSIS — Z13.5 SCREENING FOR DIABETIC RETINOPATHY: ICD-10-CM

## 2021-07-06 ENCOUNTER — TELEPHONE (OUTPATIENT)
Dept: FAMILY MEDICINE | Facility: CLINIC | Age: 71
End: 2021-07-06

## 2021-07-20 ENCOUNTER — TELEPHONE (OUTPATIENT)
Dept: FAMILY MEDICINE | Facility: CLINIC | Age: 71
End: 2021-07-20

## 2021-07-22 ENCOUNTER — PATIENT OUTREACH (OUTPATIENT)
Dept: FAMILY MEDICINE | Facility: CLINIC | Age: 71
End: 2021-07-22

## 2021-07-26 ENCOUNTER — TELEPHONE (OUTPATIENT)
Dept: FAMILY MEDICINE | Facility: CLINIC | Age: 71
End: 2021-07-26

## 2021-07-27 DIAGNOSIS — E78.2 MIXED HYPERLIPIDEMIA: ICD-10-CM

## 2021-07-27 DIAGNOSIS — I10 ESSENTIAL HYPERTENSION: ICD-10-CM

## 2021-07-27 RX ORDER — PRAVASTATIN SODIUM 40 MG/1
40 TABLET ORAL DAILY
Qty: 90 TABLET | Refills: 1 | Status: ON HOLD | OUTPATIENT
Start: 2021-07-27 | End: 2021-12-01 | Stop reason: HOSPADM

## 2021-07-27 RX ORDER — IRBESARTAN AND HYDROCHLOROTHIAZIDE 300; 12.5 MG/1; MG/1
1 TABLET, FILM COATED ORAL DAILY
Qty: 90 TABLET | Refills: 1 | Status: ON HOLD | OUTPATIENT
Start: 2021-07-27 | End: 2021-12-01 | Stop reason: HOSPADM

## 2021-07-27 RX ORDER — CARVEDILOL 6.25 MG/1
6.25 TABLET ORAL 2 TIMES DAILY
Qty: 180 TABLET | Refills: 1 | Status: ON HOLD | OUTPATIENT
Start: 2021-07-27 | End: 2021-12-01 | Stop reason: HOSPADM

## 2021-07-27 RX ORDER — METFORMIN HYDROCHLORIDE 1000 MG/1
1000 TABLET ORAL 2 TIMES DAILY WITH MEALS
Qty: 180 TABLET | Refills: 1 | Status: ON HOLD | OUTPATIENT
Start: 2021-07-27 | End: 2021-12-01 | Stop reason: SDUPTHER

## 2021-07-27 RX ORDER — GLIPIZIDE 10 MG/1
10 TABLET ORAL 2 TIMES DAILY WITH MEALS
Qty: 180 TABLET | Refills: 1 | Status: SHIPPED | OUTPATIENT
Start: 2021-07-27 | End: 2022-04-01 | Stop reason: SDUPTHER

## 2021-09-17 ENCOUNTER — TELEPHONE (OUTPATIENT)
Dept: FAMILY MEDICINE | Facility: CLINIC | Age: 71
End: 2021-09-17

## 2021-09-29 ENCOUNTER — TELEPHONE (OUTPATIENT)
Dept: FAMILY MEDICINE | Facility: CLINIC | Age: 71
End: 2021-09-29

## 2021-09-29 DIAGNOSIS — E78.5 HYPERLIPIDEMIA WITH TARGET LOW DENSITY LIPOPROTEIN (LDL) CHOLESTEROL LESS THAN 100 MG/DL: ICD-10-CM

## 2021-09-29 DIAGNOSIS — Z79.899 HIGH RISK MEDICATION USE: Primary | ICD-10-CM

## 2021-10-11 ENCOUNTER — TELEPHONE (OUTPATIENT)
Dept: FAMILY MEDICINE | Facility: CLINIC | Age: 71
End: 2021-10-11

## 2021-11-11 ENCOUNTER — TELEPHONE (OUTPATIENT)
Dept: FAMILY MEDICINE | Facility: CLINIC | Age: 71
End: 2021-11-11
Payer: MEDICARE

## 2021-11-24 LAB
ALBUMIN SERPL-MCNC: 4.3 G/DL (ref 3.6–5.1)
ALBUMIN/GLOB SERPL: 1.9 (CALC) (ref 1–2.5)
ALP SERPL-CCNC: 70 U/L (ref 35–144)
ALT SERPL-CCNC: 17 U/L (ref 9–46)
AST SERPL-CCNC: 16 U/L (ref 10–35)
BILIRUB SERPL-MCNC: 0.5 MG/DL (ref 0.2–1.2)
BUN SERPL-MCNC: 30 MG/DL (ref 7–25)
BUN/CREAT SERPL: 17 (CALC) (ref 6–22)
CALCIUM SERPL-MCNC: 9.4 MG/DL (ref 8.6–10.3)
CHLORIDE SERPL-SCNC: 107 MMOL/L (ref 98–110)
CHOLEST SERPL-MCNC: 172 MG/DL
CHOLEST/HDLC SERPL: 5.1 (CALC)
CO2 SERPL-SCNC: 24 MMOL/L (ref 20–32)
CREAT SERPL-MCNC: 1.75 MG/DL (ref 0.7–1.18)
GLOBULIN SER CALC-MCNC: 2.3 G/DL (CALC) (ref 1.9–3.7)
GLUCOSE SERPL-MCNC: 180 MG/DL (ref 65–99)
HBA1C MFR BLD: 9.6 % OF TOTAL HGB
HDLC SERPL-MCNC: 34 MG/DL
LDLC SERPL CALC-MCNC: 112 MG/DL (CALC)
NONHDLC SERPL-MCNC: 138 MG/DL (CALC)
POTASSIUM SERPL-SCNC: 5.4 MMOL/L (ref 3.5–5.3)
PROT SERPL-MCNC: 6.6 G/DL (ref 6.1–8.1)
SODIUM SERPL-SCNC: 140 MMOL/L (ref 135–146)
TRIGL SERPL-MCNC: 147 MG/DL

## 2021-11-26 ENCOUNTER — HOSPITAL ENCOUNTER (INPATIENT)
Facility: HOSPITAL | Age: 71
LOS: 1 days | Discharge: ANOTHER HOSPITAL | DRG: 281 | End: 2021-11-27
Attending: EMERGENCY MEDICINE | Admitting: INTERNAL MEDICINE
Payer: MEDICARE

## 2021-11-26 DIAGNOSIS — R07.9 CHEST PAIN: ICD-10-CM

## 2021-11-26 DIAGNOSIS — I25.10 CAD (CORONARY ARTERY DISEASE): ICD-10-CM

## 2021-11-26 DIAGNOSIS — I21.4 NSTEMI (NON-ST ELEVATED MYOCARDIAL INFARCTION): Primary | ICD-10-CM

## 2021-11-26 LAB
ALBUMIN SERPL BCP-MCNC: 4 G/DL (ref 3.5–5.2)
ALP SERPL-CCNC: 63 U/L (ref 55–135)
ALT SERPL W/O P-5'-P-CCNC: 25 U/L (ref 10–44)
ANION GAP SERPL CALC-SCNC: 12 MMOL/L (ref 8–16)
AST SERPL-CCNC: 25 U/L (ref 10–40)
BACTERIA #/AREA URNS HPF: NEGATIVE /HPF
BASOPHILS # BLD AUTO: 0.07 K/UL (ref 0–0.2)
BASOPHILS NFR BLD: 0.8 % (ref 0–1.9)
BILIRUB SERPL-MCNC: 1 MG/DL (ref 0.1–1)
BILIRUB UR QL STRIP: NEGATIVE
BUN SERPL-MCNC: 35 MG/DL (ref 8–23)
CALCIUM SERPL-MCNC: 8.7 MG/DL (ref 8.7–10.5)
CHLORIDE SERPL-SCNC: 106 MMOL/L (ref 95–110)
CLARITY UR: CLEAR
CO2 SERPL-SCNC: 19 MMOL/L (ref 23–29)
COLOR UR: YELLOW
CREAT SERPL-MCNC: 1.8 MG/DL (ref 0.5–1.4)
DIFFERENTIAL METHOD: ABNORMAL
EOSINOPHIL # BLD AUTO: 0.1 K/UL (ref 0–0.5)
EOSINOPHIL NFR BLD: 1.4 % (ref 0–8)
ERYTHROCYTE [DISTWIDTH] IN BLOOD BY AUTOMATED COUNT: 12.7 % (ref 11.5–14.5)
EST. GFR  (AFRICAN AMERICAN): 42.2 ML/MIN/1.73 M^2
EST. GFR  (NON AFRICAN AMERICAN): 36.5 ML/MIN/1.73 M^2
GLUCOSE SERPL-MCNC: 371 MG/DL (ref 70–110)
GLUCOSE UR QL STRIP: ABNORMAL
HCT VFR BLD AUTO: 37.3 % (ref 40–54)
HGB BLD-MCNC: 12.3 G/DL (ref 14–18)
HGB UR QL STRIP: NEGATIVE
HYALINE CASTS #/AREA URNS LPF: 0 /LPF
IMM GRANULOCYTES # BLD AUTO: 0.02 K/UL (ref 0–0.04)
IMM GRANULOCYTES NFR BLD AUTO: 0.2 % (ref 0–0.5)
INR PPP: 1.4
KETONES UR QL STRIP: ABNORMAL
LEUKOCYTE ESTERASE UR QL STRIP: NEGATIVE
LYMPHOCYTES # BLD AUTO: 1.1 K/UL (ref 1–4.8)
LYMPHOCYTES NFR BLD: 12.5 % (ref 18–48)
MAGNESIUM SERPL-MCNC: 1.7 MG/DL (ref 1.6–2.6)
MCH RBC QN AUTO: 30.7 PG (ref 27–31)
MCHC RBC AUTO-ENTMCNC: 33 G/DL (ref 32–36)
MCV RBC AUTO: 93 FL (ref 82–98)
MICROSCOPIC COMMENT: ABNORMAL
MONOCYTES # BLD AUTO: 0.4 K/UL (ref 0.3–1)
MONOCYTES NFR BLD: 4.2 % (ref 4–15)
NEUTROPHILS # BLD AUTO: 7 K/UL (ref 1.8–7.7)
NEUTROPHILS NFR BLD: 80.9 % (ref 38–73)
NITRITE UR QL STRIP: NEGATIVE
NRBC BLD-RTO: 0 /100 WBC
PH UR STRIP: 7 [PH] (ref 5–8)
PLATELET # BLD AUTO: 196 K/UL (ref 150–450)
PMV BLD AUTO: 10.2 FL (ref 9.2–12.9)
POTASSIUM SERPL-SCNC: 4.8 MMOL/L (ref 3.5–5.1)
PROT SERPL-MCNC: 7.1 G/DL (ref 6–8.4)
PROT UR QL STRIP: ABNORMAL
PROTHROMBIN TIME: 16.5 SEC (ref 11.4–13.7)
RBC # BLD AUTO: 4.01 M/UL (ref 4.6–6.2)
RBC #/AREA URNS HPF: 1 /HPF (ref 0–4)
SARS-COV-2 RDRP RESP QL NAA+PROBE: NEGATIVE
SODIUM SERPL-SCNC: 137 MMOL/L (ref 136–145)
SP GR UR STRIP: 1.01 (ref 1–1.03)
SQUAMOUS #/AREA URNS HPF: 0 /HPF
TROPONIN I SERPL DL<=0.01 NG/ML-MCNC: 0.09 NG/ML
URN SPEC COLLECT METH UR: ABNORMAL
UROBILINOGEN UR STRIP-ACNC: NEGATIVE EU/DL
WBC # BLD AUTO: 8.67 K/UL (ref 3.9–12.7)
WBC #/AREA URNS HPF: 0 /HPF (ref 0–5)
YEAST URNS QL MICRO: ABNORMAL

## 2021-11-26 PROCEDURE — 93010 ELECTROCARDIOGRAM REPORT: CPT | Mod: ,,, | Performed by: SPECIALIST

## 2021-11-26 PROCEDURE — 83880 ASSAY OF NATRIURETIC PEPTIDE: CPT | Performed by: EMERGENCY MEDICINE

## 2021-11-26 PROCEDURE — 83735 ASSAY OF MAGNESIUM: CPT | Performed by: EMERGENCY MEDICINE

## 2021-11-26 PROCEDURE — 93005 ELECTROCARDIOGRAM TRACING: CPT | Performed by: SPECIALIST

## 2021-11-26 PROCEDURE — 93010 EKG 12-LEAD: ICD-10-PCS | Mod: ,,, | Performed by: SPECIALIST

## 2021-11-26 PROCEDURE — U0002 COVID-19 LAB TEST NON-CDC: HCPCS | Performed by: EMERGENCY MEDICINE

## 2021-11-26 PROCEDURE — 84443 ASSAY THYROID STIM HORMONE: CPT | Performed by: EMERGENCY MEDICINE

## 2021-11-26 PROCEDURE — 85025 COMPLETE CBC W/AUTO DIFF WBC: CPT | Performed by: EMERGENCY MEDICINE

## 2021-11-26 PROCEDURE — 99291 CRITICAL CARE FIRST HOUR: CPT

## 2021-11-26 PROCEDURE — 81001 URINALYSIS AUTO W/SCOPE: CPT | Performed by: EMERGENCY MEDICINE

## 2021-11-26 PROCEDURE — 25000003 PHARM REV CODE 250: Performed by: EMERGENCY MEDICINE

## 2021-11-26 PROCEDURE — 84484 ASSAY OF TROPONIN QUANT: CPT | Performed by: EMERGENCY MEDICINE

## 2021-11-26 PROCEDURE — 85610 PROTHROMBIN TIME: CPT | Performed by: EMERGENCY MEDICINE

## 2021-11-26 PROCEDURE — 80053 COMPREHEN METABOLIC PANEL: CPT | Performed by: EMERGENCY MEDICINE

## 2021-11-26 RX ORDER — NAPROXEN SODIUM 220 MG/1
243 TABLET, FILM COATED ORAL DAILY
Status: DISCONTINUED | OUTPATIENT
Start: 2021-11-27 | End: 2021-11-26

## 2021-11-26 RX ADMIN — NITROGLYCERIN 1 INCH: 20 OINTMENT TOPICAL at 10:11

## 2021-11-27 ENCOUNTER — HOSPITAL ENCOUNTER (INPATIENT)
Facility: HOSPITAL | Age: 71
LOS: 4 days | Discharge: HOME | DRG: 246 | End: 2021-12-01
Attending: INTERNAL MEDICINE | Admitting: INTERNAL MEDICINE
Payer: MEDICARE

## 2021-11-27 ENCOUNTER — CLINICAL SUPPORT (OUTPATIENT)
Dept: CARDIOLOGY | Facility: HOSPITAL | Age: 71
DRG: 281 | End: 2021-11-27
Payer: MEDICARE

## 2021-11-27 VITALS — BODY MASS INDEX: 33.34 KG/M2 | WEIGHT: 220 LBS | HEIGHT: 68 IN

## 2021-11-27 VITALS
HEART RATE: 64 BPM | WEIGHT: 220 LBS | OXYGEN SATURATION: 94 % | TEMPERATURE: 99 F | BODY MASS INDEX: 33.45 KG/M2 | DIASTOLIC BLOOD PRESSURE: 63 MMHG | RESPIRATION RATE: 18 BRPM | SYSTOLIC BLOOD PRESSURE: 141 MMHG

## 2021-11-27 DIAGNOSIS — I25.10 CAD (CORONARY ARTERY DISEASE): ICD-10-CM

## 2021-11-27 DIAGNOSIS — I21.4 NSTEMI (NON-ST ELEVATED MYOCARDIAL INFARCTION): ICD-10-CM

## 2021-11-27 DIAGNOSIS — I21.4 NSTEMI (NON-ST ELEVATION MYOCARDIAL INFARCTION): Primary | ICD-10-CM

## 2021-11-27 PROBLEM — I50.43 ACUTE ON CHRONIC COMBINED SYSTOLIC AND DIASTOLIC HEART FAILURE: Status: ACTIVE | Noted: 2021-11-27

## 2021-11-27 PROBLEM — I16.1 HYPERTENSIVE EMERGENCY: Status: ACTIVE | Noted: 2021-11-27

## 2021-11-27 LAB
ALBUMIN SERPL BCP-MCNC: 3.4 G/DL (ref 3.5–5.2)
ALP SERPL-CCNC: 64 U/L (ref 55–135)
ALT SERPL W/O P-5'-P-CCNC: 23 U/L (ref 10–44)
ANION GAP SERPL CALC-SCNC: 10 MMOL/L (ref 8–16)
ANION GAP SERPL CALC-SCNC: 9 MMOL/L (ref 8–16)
AORTIC ROOT ANNULUS: 3.08 CM
AORTIC VALVE CUSP SEPERATION: 2 CM
APTT BLDCRRT: 27.5 SEC (ref 21–32)
APTT PPP: 32.3 SEC (ref 23.3–35.1)
AST SERPL-CCNC: 49 U/L (ref 10–40)
AV INDEX (PROSTH): 0.74
AV MEAN GRADIENT: 6 MMHG
AV PEAK GRADIENT: 10 MMHG
AV VALVE AREA: 2.98 CM2
AV VELOCITY RATIO: 75.78
BASOPHILS # BLD AUTO: 0.07 K/UL (ref 0–0.2)
BASOPHILS # BLD AUTO: 0.07 K/UL (ref 0–0.2)
BASOPHILS NFR BLD: 0.7 % (ref 0–1.9)
BASOPHILS NFR BLD: 0.7 % (ref 0–1.9)
BILIRUB SERPL-MCNC: 0.7 MG/DL (ref 0.1–1)
BNP SERPL-MCNC: 75 PG/ML (ref 0–99)
BSA FOR ECHO PROCEDURE: 2.19 M2
BUN SERPL-MCNC: 28 MG/DL (ref 8–23)
BUN SERPL-MCNC: 35 MG/DL (ref 8–23)
CALCIUM SERPL-MCNC: 8.6 MG/DL (ref 8.7–10.5)
CALCIUM SERPL-MCNC: 8.7 MG/DL (ref 8.7–10.5)
CHLORIDE SERPL-SCNC: 108 MMOL/L (ref 95–110)
CHLORIDE SERPL-SCNC: 111 MMOL/L (ref 95–110)
CHOLEST SERPL-MCNC: 169 MG/DL (ref 120–199)
CHOLEST/HDLC SERPL: 5 {RATIO} (ref 2–5)
CO2 SERPL-SCNC: 20 MMOL/L (ref 23–29)
CO2 SERPL-SCNC: 21 MMOL/L (ref 23–29)
CREAT SERPL-MCNC: 1.5 MG/DL (ref 0.5–1.4)
CREAT SERPL-MCNC: 1.8 MG/DL (ref 0.5–1.4)
CV ECHO LV RWT: 0.67 CM
DIFFERENTIAL METHOD: ABNORMAL
DIFFERENTIAL METHOD: ABNORMAL
DOP CALC AO PEAK VEL: 1.6 M/S
DOP CALC AO VTI: 34.5 CM
DOP CALC LVOT AREA: 4 CM2
DOP CALC LVOT DIAMETER: 2.27 CM
DOP CALC LVOT PEAK VEL: 121.24 M/S
DOP CALC LVOT STROKE VOLUME: 102.87 CM3
DOP CALCLVOT PEAK VEL VTI: 25.43 CM
E WAVE DECELERATION TIME: 252.78 MSEC
E/A RATIO: 0.56
E/E' RATIO: 9.85 M/S
ECHO LV POSTERIOR WALL: 1.71 CM (ref 0.6–1.1)
EJECTION FRACTION: 45 %
EOSINOPHIL # BLD AUTO: 0.2 K/UL (ref 0–0.5)
EOSINOPHIL # BLD AUTO: 0.2 K/UL (ref 0–0.5)
EOSINOPHIL NFR BLD: 1.6 % (ref 0–8)
EOSINOPHIL NFR BLD: 1.6 % (ref 0–8)
ERYTHROCYTE [DISTWIDTH] IN BLOOD BY AUTOMATED COUNT: 12.6 % (ref 11.5–14.5)
ERYTHROCYTE [DISTWIDTH] IN BLOOD BY AUTOMATED COUNT: 12.6 % (ref 11.5–14.5)
EST. GFR  (AFRICAN AMERICAN): 42.2 ML/MIN/1.73 M^2
EST. GFR  (AFRICAN AMERICAN): 52.6 ML/MIN/1.73 M^2
EST. GFR  (NON AFRICAN AMERICAN): 36.5 ML/MIN/1.73 M^2
EST. GFR  (NON AFRICAN AMERICAN): 45.5 ML/MIN/1.73 M^2
ESTIMATED AVG GLUCOSE: 217 MG/DL (ref 68–131)
FRACTIONAL SHORTENING: 21 % (ref 28–44)
GLUCOSE SERPL-MCNC: 178 MG/DL (ref 70–110)
GLUCOSE SERPL-MCNC: 272 MG/DL (ref 70–110)
HBA1C MFR BLD: 9.2 % (ref 4.5–6.2)
HCT VFR BLD AUTO: 32 % (ref 40–54)
HCT VFR BLD AUTO: 32 % (ref 40–54)
HDLC SERPL-MCNC: 34 MG/DL (ref 40–75)
HDLC SERPL: 20.1 % (ref 20–50)
HGB BLD-MCNC: 10.6 G/DL (ref 14–18)
HGB BLD-MCNC: 10.6 G/DL (ref 14–18)
IMM GRANULOCYTES # BLD AUTO: 0.06 K/UL (ref 0–0.04)
IMM GRANULOCYTES # BLD AUTO: 0.06 K/UL (ref 0–0.04)
IMM GRANULOCYTES NFR BLD AUTO: 0.6 % (ref 0–0.5)
IMM GRANULOCYTES NFR BLD AUTO: 0.6 % (ref 0–0.5)
INR PPP: 1.1 (ref 0.8–1.2)
INTERVENTRICULAR SEPTUM: 1.71 CM (ref 0.6–1.1)
IVRT: 87.02 MSEC
LDLC SERPL CALC-MCNC: 111.2 MG/DL (ref 63–159)
LEFT ATRIUM SIZE: 4.12 CM
LEFT INTERNAL DIMENSION IN SYSTOLE: 4.02 CM (ref 2.1–4)
LEFT VENTRICLE MASS INDEX: 190 G/M2
LEFT VENTRICULAR INTERNAL DIMENSION IN DIASTOLE: 5.09 CM (ref 3.5–6)
LEFT VENTRICULAR MASS: 403.64 G
LV LATERAL E/E' RATIO: 8 M/S
LV SEPTAL E/E' RATIO: 12.8 M/S
LYMPHOCYTES # BLD AUTO: 1.6 K/UL (ref 1–4.8)
LYMPHOCYTES # BLD AUTO: 1.6 K/UL (ref 1–4.8)
LYMPHOCYTES NFR BLD: 16.2 % (ref 18–48)
LYMPHOCYTES NFR BLD: 16.2 % (ref 18–48)
MAGNESIUM SERPL-MCNC: 1.7 MG/DL (ref 1.6–2.6)
MAGNESIUM SERPL-MCNC: 1.8 MG/DL (ref 1.6–2.6)
MCH RBC QN AUTO: 30.9 PG (ref 27–31)
MCH RBC QN AUTO: 30.9 PG (ref 27–31)
MCHC RBC AUTO-ENTMCNC: 33.1 G/DL (ref 32–36)
MCHC RBC AUTO-ENTMCNC: 33.1 G/DL (ref 32–36)
MCV RBC AUTO: 93 FL (ref 82–98)
MCV RBC AUTO: 93 FL (ref 82–98)
MONOCYTES # BLD AUTO: 0.7 K/UL (ref 0.3–1)
MONOCYTES # BLD AUTO: 0.7 K/UL (ref 0.3–1)
MONOCYTES NFR BLD: 7.4 % (ref 4–15)
MONOCYTES NFR BLD: 7.4 % (ref 4–15)
MV PEAK A VEL: 1.14 M/S
MV PEAK E VEL: 0.64 M/S
NEUTROPHILS # BLD AUTO: 7 K/UL (ref 1.8–7.7)
NEUTROPHILS # BLD AUTO: 7 K/UL (ref 1.8–7.7)
NEUTROPHILS NFR BLD: 73.5 % (ref 38–73)
NEUTROPHILS NFR BLD: 73.5 % (ref 38–73)
NONHDLC SERPL-MCNC: 135 MG/DL
NRBC BLD-RTO: 0 /100 WBC
NRBC BLD-RTO: 0 /100 WBC
PHOSPHATE SERPL-MCNC: 3.5 MG/DL (ref 2.7–4.5)
PLATELET # BLD AUTO: 182 K/UL (ref 150–450)
PLATELET # BLD AUTO: 182 K/UL (ref 150–450)
PMV BLD AUTO: 9.9 FL (ref 9.2–12.9)
PMV BLD AUTO: 9.9 FL (ref 9.2–12.9)
POC ACTIVATED CLOTTING TIME K: 166 SEC (ref 74–137)
POCT GLUCOSE: 216 MG/DL (ref 70–110)
POTASSIUM SERPL-SCNC: 3.9 MMOL/L (ref 3.5–5.1)
POTASSIUM SERPL-SCNC: 4.7 MMOL/L (ref 3.5–5.1)
PROT SERPL-MCNC: 5.8 G/DL (ref 6–8.4)
PROTHROMBIN TIME: 12 SEC (ref 9–12.5)
PV PEAK VELOCITY: 119.29 CM/S
RA PRESSURE: 3 MMHG
RBC # BLD AUTO: 3.43 M/UL (ref 4.6–6.2)
RBC # BLD AUTO: 3.43 M/UL (ref 4.6–6.2)
RIGHT VENTRICULAR END-DIASTOLIC DIMENSION: 206 CM
SAMPLE: ABNORMAL
SODIUM SERPL-SCNC: 138 MMOL/L (ref 136–145)
SODIUM SERPL-SCNC: 141 MMOL/L (ref 136–145)
TDI LATERAL: 0.08 M/S
TDI SEPTAL: 0.05 M/S
TDI: 0.07 M/S
TRIGL SERPL-MCNC: 119 MG/DL (ref 30–150)
TROPONIN I SERPL DL<=0.01 NG/ML-MCNC: 15.04 NG/ML
TROPONIN I SERPL DL<=0.01 NG/ML-MCNC: 17.85 NG/ML
TROPONIN I SERPL DL<=0.01 NG/ML-MCNC: 4.92 NG/ML
TSH SERPL DL<=0.005 MIU/L-ACNC: 2.28 UIU/ML (ref 0.34–5.6)
WBC # BLD AUTO: 9.54 K/UL (ref 3.9–12.7)
WBC # BLD AUTO: 9.54 K/UL (ref 3.9–12.7)

## 2021-11-27 PROCEDURE — 99153 MOD SED SAME PHYS/QHP EA: CPT | Performed by: INTERNAL MEDICINE

## 2021-11-27 PROCEDURE — 27800903 OPTIME MED/SURG SUP & DEVICES OTHER IMPLANTS: Performed by: INTERNAL MEDICINE

## 2021-11-27 PROCEDURE — 63600175 PHARM REV CODE 636 W HCPCS: Performed by: STUDENT IN AN ORGANIZED HEALTH CARE EDUCATION/TRAINING PROGRAM

## 2021-11-27 PROCEDURE — 99152 MOD SED SAME PHYS/QHP 5/>YRS: CPT | Performed by: INTERNAL MEDICINE

## 2021-11-27 PROCEDURE — 99222 PR INITIAL HOSPITAL CARE,LEVL II: ICD-10-PCS | Mod: 25,,, | Performed by: INTERNAL MEDICINE

## 2021-11-27 PROCEDURE — 84100 ASSAY OF PHOSPHORUS: CPT | Performed by: STUDENT IN AN ORGANIZED HEALTH CARE EDUCATION/TRAINING PROGRAM

## 2021-11-27 PROCEDURE — 20000000 HC ICU ROOM

## 2021-11-27 PROCEDURE — 93010 ELECTROCARDIOGRAM REPORT: CPT | Mod: ,,, | Performed by: INTERNAL MEDICINE

## 2021-11-27 PROCEDURE — 93005 ELECTROCARDIOGRAM TRACING: CPT

## 2021-11-27 PROCEDURE — 63600175 PHARM REV CODE 636 W HCPCS: Performed by: INTERNAL MEDICINE

## 2021-11-27 PROCEDURE — 84484 ASSAY OF TROPONIN QUANT: CPT | Mod: 91 | Performed by: HOSPITALIST

## 2021-11-27 PROCEDURE — 80053 COMPREHEN METABOLIC PANEL: CPT | Performed by: STUDENT IN AN ORGANIZED HEALTH CARE EDUCATION/TRAINING PROGRAM

## 2021-11-27 PROCEDURE — 85025 COMPLETE CBC W/AUTO DIFF WBC: CPT | Performed by: STUDENT IN AN ORGANIZED HEALTH CARE EDUCATION/TRAINING PROGRAM

## 2021-11-27 PROCEDURE — 63600175 PHARM REV CODE 636 W HCPCS: Performed by: NURSE PRACTITIONER

## 2021-11-27 PROCEDURE — 93458 PR CATH PLACE/CORON ANGIO, IMG SUPER/INTERP,W LEFT HEART VENTRICULOGRAPHY: ICD-10-PCS | Mod: 26,,, | Performed by: INTERNAL MEDICINE

## 2021-11-27 PROCEDURE — 84484 ASSAY OF TROPONIN QUANT: CPT | Performed by: NURSE PRACTITIONER

## 2021-11-27 PROCEDURE — C1887 CATHETER, GUIDING: HCPCS | Performed by: INTERNAL MEDICINE

## 2021-11-27 PROCEDURE — 25000003 PHARM REV CODE 250: Performed by: INTERNAL MEDICINE

## 2021-11-27 PROCEDURE — C1769 GUIDE WIRE: HCPCS | Performed by: INTERNAL MEDICINE

## 2021-11-27 PROCEDURE — 85610 PROTHROMBIN TIME: CPT | Performed by: STUDENT IN AN ORGANIZED HEALTH CARE EDUCATION/TRAINING PROGRAM

## 2021-11-27 PROCEDURE — 93306 ECHO (CUPID ONLY): ICD-10-PCS | Mod: 26,,, | Performed by: INTERNAL MEDICINE

## 2021-11-27 PROCEDURE — 99223 PR INITIAL HOSPITAL CARE,LEVL III: ICD-10-PCS | Mod: AI,GC,, | Performed by: INTERNAL MEDICINE

## 2021-11-27 PROCEDURE — 93458 L HRT ARTERY/VENTRICLE ANGIO: CPT | Performed by: INTERNAL MEDICINE

## 2021-11-27 PROCEDURE — 93306 TTE W/DOPPLER COMPLETE: CPT | Mod: 26,,, | Performed by: INTERNAL MEDICINE

## 2021-11-27 PROCEDURE — 85730 THROMBOPLASTIN TIME PARTIAL: CPT | Mod: 91 | Performed by: STUDENT IN AN ORGANIZED HEALTH CARE EDUCATION/TRAINING PROGRAM

## 2021-11-27 PROCEDURE — 84484 ASSAY OF TROPONIN QUANT: CPT | Mod: 91 | Performed by: INTERNAL MEDICINE

## 2021-11-27 PROCEDURE — 96374 THER/PROPH/DIAG INJ IV PUSH: CPT

## 2021-11-27 PROCEDURE — 93458 L HRT ARTERY/VENTRICLE ANGIO: CPT | Mod: 26,,, | Performed by: INTERNAL MEDICINE

## 2021-11-27 PROCEDURE — 36415 COLL VENOUS BLD VENIPUNCTURE: CPT | Performed by: INTERNAL MEDICINE

## 2021-11-27 PROCEDURE — 25000003 PHARM REV CODE 250: Performed by: STUDENT IN AN ORGANIZED HEALTH CARE EDUCATION/TRAINING PROGRAM

## 2021-11-27 PROCEDURE — 80061 LIPID PANEL: CPT | Performed by: NURSE PRACTITIONER

## 2021-11-27 PROCEDURE — 25000003 PHARM REV CODE 250: Performed by: NURSE PRACTITIONER

## 2021-11-27 PROCEDURE — 83036 HEMOGLOBIN GLYCOSYLATED A1C: CPT | Performed by: NURSE PRACTITIONER

## 2021-11-27 PROCEDURE — 93010 EKG 12-LEAD: ICD-10-PCS | Mod: ,,, | Performed by: INTERNAL MEDICINE

## 2021-11-27 PROCEDURE — 99152 MOD SED SAME PHYS/QHP 5/>YRS: CPT | Mod: ,,, | Performed by: INTERNAL MEDICINE

## 2021-11-27 PROCEDURE — 25000003 PHARM REV CODE 250: Performed by: EMERGENCY MEDICINE

## 2021-11-27 PROCEDURE — 93306 TTE W/DOPPLER COMPLETE: CPT

## 2021-11-27 PROCEDURE — 99152 PR MOD CONSCIOUS SEDATION, SAME PHYS, 5+ YRS, FIRST 15 MIN: ICD-10-PCS | Mod: ,,, | Performed by: INTERNAL MEDICINE

## 2021-11-27 PROCEDURE — 99222 1ST HOSP IP/OBS MODERATE 55: CPT | Mod: 25,,, | Performed by: INTERNAL MEDICINE

## 2021-11-27 PROCEDURE — 85730 THROMBOPLASTIN TIME PARTIAL: CPT | Performed by: INTERNAL MEDICINE

## 2021-11-27 PROCEDURE — C1894 INTRO/SHEATH, NON-LASER: HCPCS | Performed by: INTERNAL MEDICINE

## 2021-11-27 PROCEDURE — 99223 1ST HOSP IP/OBS HIGH 75: CPT | Mod: AI,GC,, | Performed by: INTERNAL MEDICINE

## 2021-11-27 PROCEDURE — 83735 ASSAY OF MAGNESIUM: CPT | Performed by: NURSE PRACTITIONER

## 2021-11-27 PROCEDURE — 25500020 PHARM REV CODE 255: Performed by: INTERNAL MEDICINE

## 2021-11-27 PROCEDURE — 83735 ASSAY OF MAGNESIUM: CPT | Mod: 91 | Performed by: STUDENT IN AN ORGANIZED HEALTH CARE EDUCATION/TRAINING PROGRAM

## 2021-11-27 PROCEDURE — 80048 BASIC METABOLIC PNL TOTAL CA: CPT | Performed by: NURSE PRACTITIONER

## 2021-11-27 RX ORDER — CLOPIDOGREL BISULFATE 75 MG/1
75 TABLET ORAL DAILY
Status: DISCONTINUED | OUTPATIENT
Start: 2021-11-28 | End: 2021-12-01 | Stop reason: HOSPADM

## 2021-11-27 RX ORDER — HYDRALAZINE HYDROCHLORIDE 25 MG/1
50 TABLET, FILM COATED ORAL EVERY 8 HOURS
Status: DISCONTINUED | OUTPATIENT
Start: 2021-11-27 | End: 2021-11-27

## 2021-11-27 RX ORDER — INSULIN ASPART 100 [IU]/ML
0-5 INJECTION, SOLUTION INTRAVENOUS; SUBCUTANEOUS
Status: DISCONTINUED | OUTPATIENT
Start: 2021-11-27 | End: 2021-11-27

## 2021-11-27 RX ORDER — IBUPROFEN 200 MG
16 TABLET ORAL
Status: DISCONTINUED | OUTPATIENT
Start: 2021-11-27 | End: 2021-11-27

## 2021-11-27 RX ORDER — HYDRALAZINE HYDROCHLORIDE 50 MG/1
50 TABLET, FILM COATED ORAL EVERY 8 HOURS
Status: DISCONTINUED | OUTPATIENT
Start: 2021-11-27 | End: 2021-12-01 | Stop reason: HOSPADM

## 2021-11-27 RX ORDER — LOSARTAN POTASSIUM 50 MG/1
100 TABLET ORAL DAILY
Status: DISCONTINUED | OUTPATIENT
Start: 2021-11-27 | End: 2021-11-27 | Stop reason: HOSPADM

## 2021-11-27 RX ORDER — SODIUM CHLORIDE 9 MG/ML
INJECTION, SOLUTION INTRAVENOUS CONTINUOUS
Status: DISCONTINUED | OUTPATIENT
Start: 2021-11-27 | End: 2021-11-27

## 2021-11-27 RX ORDER — SODIUM CHLORIDE 9 MG/ML
INJECTION, SOLUTION INTRAVENOUS CONTINUOUS
Status: DISCONTINUED | OUTPATIENT
Start: 2021-11-27 | End: 2021-11-29

## 2021-11-27 RX ORDER — GLUCAGON 1 MG
1 KIT INJECTION
Status: DISCONTINUED | OUTPATIENT
Start: 2021-11-27 | End: 2021-12-01 | Stop reason: HOSPADM

## 2021-11-27 RX ORDER — MIDAZOLAM HYDROCHLORIDE 1 MG/ML
INJECTION INTRAMUSCULAR; INTRAVENOUS
Status: DISCONTINUED | OUTPATIENT
Start: 2021-11-27 | End: 2021-11-27 | Stop reason: HOSPADM

## 2021-11-27 RX ORDER — GLUCAGON 1 MG
1 KIT INJECTION
Status: DISCONTINUED | OUTPATIENT
Start: 2021-11-27 | End: 2021-11-27

## 2021-11-27 RX ORDER — MAGNESIUM SULFATE 1 G/100ML
1 INJECTION INTRAVENOUS ONCE
Status: COMPLETED | OUTPATIENT
Start: 2021-11-27 | End: 2021-11-28

## 2021-11-27 RX ORDER — CARVEDILOL 6.25 MG/1
6.25 TABLET ORAL 2 TIMES DAILY
Status: DISCONTINUED | OUTPATIENT
Start: 2021-11-27 | End: 2021-11-29

## 2021-11-27 RX ORDER — AMOXICILLIN 250 MG
1 CAPSULE ORAL 2 TIMES DAILY PRN
Status: DISCONTINUED | OUTPATIENT
Start: 2021-11-27 | End: 2021-11-27 | Stop reason: HOSPADM

## 2021-11-27 RX ORDER — HEPARIN SODIUM,PORCINE/D5W 25000/250
0-40 INTRAVENOUS SOLUTION INTRAVENOUS CONTINUOUS
Status: DISCONTINUED | OUTPATIENT
Start: 2021-11-27 | End: 2021-11-30

## 2021-11-27 RX ORDER — PRAVASTATIN SODIUM 40 MG/1
40 TABLET ORAL DAILY
Status: DISCONTINUED | OUTPATIENT
Start: 2021-11-27 | End: 2021-11-27 | Stop reason: HOSPADM

## 2021-11-27 RX ORDER — NITROGLYCERIN 20 MG/100ML
0-400 INJECTION INTRAVENOUS CONTINUOUS
Status: DISCONTINUED | OUTPATIENT
Start: 2021-11-27 | End: 2021-11-27

## 2021-11-27 RX ORDER — NITROGLYCERIN 20 MG/100ML
0-400 INJECTION INTRAVENOUS CONTINUOUS
Status: DISCONTINUED | OUTPATIENT
Start: 2021-11-27 | End: 2021-11-28

## 2021-11-27 RX ORDER — ACETAMINOPHEN 325 MG/1
650 TABLET ORAL EVERY 4 HOURS PRN
Status: DISCONTINUED | OUTPATIENT
Start: 2021-11-27 | End: 2021-11-27 | Stop reason: HOSPADM

## 2021-11-27 RX ORDER — AMLODIPINE BESYLATE 10 MG/1
10 TABLET ORAL DAILY
Status: DISCONTINUED | OUTPATIENT
Start: 2021-11-28 | End: 2021-12-01 | Stop reason: HOSPADM

## 2021-11-27 RX ORDER — ISOSORBIDE DINITRATE 20 MG/1
20 TABLET ORAL 3 TIMES DAILY
Status: DISCONTINUED | OUTPATIENT
Start: 2021-11-28 | End: 2021-11-29

## 2021-11-27 RX ORDER — ASPIRIN 325 MG
325 TABLET ORAL DAILY
Status: DISCONTINUED | OUTPATIENT
Start: 2021-11-27 | End: 2021-11-27

## 2021-11-27 RX ORDER — NAPROXEN SODIUM 220 MG/1
81 TABLET, FILM COATED ORAL DAILY
Status: DISCONTINUED | OUTPATIENT
Start: 2021-11-28 | End: 2021-12-01 | Stop reason: HOSPADM

## 2021-11-27 RX ORDER — CARVEDILOL 6.25 MG/1
6.25 TABLET ORAL 2 TIMES DAILY
Status: DISCONTINUED | OUTPATIENT
Start: 2021-11-27 | End: 2021-11-27 | Stop reason: HOSPADM

## 2021-11-27 RX ORDER — ONDANSETRON 2 MG/ML
4 INJECTION INTRAMUSCULAR; INTRAVENOUS EVERY 6 HOURS PRN
Status: DISCONTINUED | OUTPATIENT
Start: 2021-11-27 | End: 2021-11-27 | Stop reason: HOSPADM

## 2021-11-27 RX ORDER — ENOXAPARIN SODIUM 100 MG/ML
1 INJECTION SUBCUTANEOUS ONCE
Status: COMPLETED | OUTPATIENT
Start: 2021-11-27 | End: 2021-11-27

## 2021-11-27 RX ORDER — IODIXANOL 320 MG/ML
INJECTION, SOLUTION INTRAVASCULAR
Status: DISCONTINUED | OUTPATIENT
Start: 2021-11-27 | End: 2021-11-27 | Stop reason: HOSPADM

## 2021-11-27 RX ORDER — SODIUM CHLORIDE 9 MG/ML
INJECTION, SOLUTION INTRAVENOUS ONCE
Status: CANCELLED | OUTPATIENT
Start: 2021-11-27 | End: 2021-11-27

## 2021-11-27 RX ORDER — ISOSORBIDE DINITRATE 10 MG/1
20 TABLET ORAL 3 TIMES DAILY
Status: DISCONTINUED | OUTPATIENT
Start: 2021-11-27 | End: 2021-11-27

## 2021-11-27 RX ORDER — HEPARIN SODIUM 10000 [USP'U]/ML
INJECTION, SOLUTION INTRAVENOUS; SUBCUTANEOUS
Status: DISCONTINUED | OUTPATIENT
Start: 2021-11-27 | End: 2021-11-27 | Stop reason: HOSPADM

## 2021-11-27 RX ORDER — INSULIN ASPART 100 [IU]/ML
1-10 INJECTION, SOLUTION INTRAVENOUS; SUBCUTANEOUS
Status: DISCONTINUED | OUTPATIENT
Start: 2021-11-27 | End: 2021-11-27 | Stop reason: HOSPADM

## 2021-11-27 RX ORDER — PRAVASTATIN SODIUM 40 MG/1
40 TABLET ORAL DAILY
Status: DISCONTINUED | OUTPATIENT
Start: 2021-11-28 | End: 2021-11-29

## 2021-11-27 RX ORDER — SODIUM CHLORIDE 0.9 % (FLUSH) 0.9 %
10 SYRINGE (ML) INJECTION
Status: DISCONTINUED | OUTPATIENT
Start: 2021-11-27 | End: 2021-11-27 | Stop reason: HOSPADM

## 2021-11-27 RX ORDER — FENTANYL CITRATE 50 UG/ML
INJECTION, SOLUTION INTRAMUSCULAR; INTRAVENOUS
Status: DISCONTINUED | OUTPATIENT
Start: 2021-11-27 | End: 2021-11-27 | Stop reason: HOSPADM

## 2021-11-27 RX ORDER — CLOPIDOGREL 300 MG/1
600 TABLET, FILM COATED ORAL ONCE
Status: COMPLETED | OUTPATIENT
Start: 2021-11-27 | End: 2021-11-27

## 2021-11-27 RX ORDER — INSULIN ASPART 100 [IU]/ML
1-10 INJECTION, SOLUTION INTRAVENOUS; SUBCUTANEOUS
Status: DISCONTINUED | OUTPATIENT
Start: 2021-11-27 | End: 2021-12-01 | Stop reason: HOSPADM

## 2021-11-27 RX ORDER — IBUPROFEN 200 MG
24 TABLET ORAL
Status: DISCONTINUED | OUTPATIENT
Start: 2021-11-27 | End: 2021-11-27

## 2021-11-27 RX ORDER — NAPROXEN SODIUM 220 MG/1
81 TABLET, FILM COATED ORAL DAILY
Status: DISCONTINUED | OUTPATIENT
Start: 2021-11-28 | End: 2021-11-27 | Stop reason: HOSPADM

## 2021-11-27 RX ORDER — NITROGLYCERIN 5 MG/ML
INJECTION, SOLUTION INTRAVENOUS
Status: DISCONTINUED | OUTPATIENT
Start: 2021-11-27 | End: 2021-11-27 | Stop reason: HOSPADM

## 2021-11-27 RX ORDER — SODIUM CHLORIDE 0.9 % (FLUSH) 0.9 %
10 SYRINGE (ML) INJECTION
Status: DISCONTINUED | OUTPATIENT
Start: 2021-11-27 | End: 2021-12-01 | Stop reason: HOSPADM

## 2021-11-27 RX ORDER — LIDOCAINE HYDROCHLORIDE 10 MG/ML
INJECTION, SOLUTION EPIDURAL; INFILTRATION; INTRACAUDAL; PERINEURAL
Status: DISCONTINUED | OUTPATIENT
Start: 2021-11-27 | End: 2021-11-27 | Stop reason: HOSPADM

## 2021-11-27 RX ORDER — IBUPROFEN 200 MG
24 TABLET ORAL
Status: DISCONTINUED | OUTPATIENT
Start: 2021-11-27 | End: 2021-12-01 | Stop reason: HOSPADM

## 2021-11-27 RX ORDER — IBUPROFEN 200 MG
16 TABLET ORAL
Status: DISCONTINUED | OUTPATIENT
Start: 2021-11-27 | End: 2021-11-27 | Stop reason: HOSPADM

## 2021-11-27 RX ORDER — LOSARTAN POTASSIUM 50 MG/1
100 TABLET ORAL DAILY
Status: DISCONTINUED | OUTPATIENT
Start: 2021-11-28 | End: 2021-11-27

## 2021-11-27 RX ORDER — METOPROLOL TARTRATE 25 MG/1
25 TABLET, FILM COATED ORAL 2 TIMES DAILY
Status: DISCONTINUED | OUTPATIENT
Start: 2021-11-27 | End: 2021-11-27

## 2021-11-27 RX ORDER — NITROGLYCERIN 20 MG/100ML
0-400 INJECTION INTRAVENOUS CONTINUOUS
Status: DISCONTINUED | OUTPATIENT
Start: 2021-11-27 | End: 2021-11-27 | Stop reason: HOSPADM

## 2021-11-27 RX ORDER — IBUPROFEN 200 MG
24 TABLET ORAL
Status: DISCONTINUED | OUTPATIENT
Start: 2021-11-27 | End: 2021-11-27 | Stop reason: HOSPADM

## 2021-11-27 RX ORDER — AMLODIPINE BESYLATE 5 MG/1
10 TABLET ORAL DAILY
Status: DISCONTINUED | OUTPATIENT
Start: 2021-11-27 | End: 2021-11-27 | Stop reason: HOSPADM

## 2021-11-27 RX ORDER — HEPARIN SODIUM,PORCINE/D5W 25000/250
0-40 INTRAVENOUS SOLUTION INTRAVENOUS CONTINUOUS
Status: DISCONTINUED | OUTPATIENT
Start: 2021-11-27 | End: 2021-11-27 | Stop reason: HOSPADM

## 2021-11-27 RX ORDER — GLUCAGON 1 MG
1 KIT INJECTION
Status: DISCONTINUED | OUTPATIENT
Start: 2021-11-27 | End: 2021-11-27 | Stop reason: HOSPADM

## 2021-11-27 RX ORDER — IBUPROFEN 200 MG
16 TABLET ORAL
Status: DISCONTINUED | OUTPATIENT
Start: 2021-11-27 | End: 2021-12-01 | Stop reason: HOSPADM

## 2021-11-27 RX ADMIN — AMLODIPINE BESYLATE 10 MG: 5 TABLET ORAL at 09:11

## 2021-11-27 RX ADMIN — HYDRALAZINE HYDROCHLORIDE 50 MG: 25 TABLET ORAL at 09:11

## 2021-11-27 RX ADMIN — NITROGLYCERIN 5 MCG/MIN: 20 INJECTION INTRAVENOUS at 12:11

## 2021-11-27 RX ADMIN — NITROGLYCERIN 50 MCG/MIN: 20 INJECTION INTRAVENOUS at 09:11

## 2021-11-27 RX ADMIN — HEPARIN SODIUM AND DEXTROSE 12 UNITS/KG/HR: 10000; 5 INJECTION INTRAVENOUS at 10:11

## 2021-11-27 RX ADMIN — CLOPIDOGREL BISULFATE 600 MG: 300 TABLET, FILM COATED ORAL at 10:11

## 2021-11-27 RX ADMIN — INSULIN ASPART 2 UNITS: 100 INJECTION, SOLUTION INTRAVENOUS; SUBCUTANEOUS at 10:11

## 2021-11-27 RX ADMIN — ISOSORBIDE DINITRATE 20 MG: 10 TABLET ORAL at 09:11

## 2021-11-27 RX ADMIN — ASPIRIN 325 MG ORAL TABLET 325 MG: 325 PILL ORAL at 09:11

## 2021-11-27 RX ADMIN — SODIUM CHLORIDE: 0.9 INJECTION, SOLUTION INTRAVENOUS at 11:11

## 2021-11-27 RX ADMIN — HYDRALAZINE HYDROCHLORIDE 50 MG: 50 TABLET ORAL at 10:11

## 2021-11-27 RX ADMIN — THERA TABS 1 TABLET: TAB at 09:11

## 2021-11-27 RX ADMIN — CARVEDILOL 6.25 MG: 6.25 TABLET, FILM COATED ORAL at 11:11

## 2021-11-27 RX ADMIN — SODIUM CHLORIDE: 0.9 INJECTION, SOLUTION INTRAVENOUS at 10:11

## 2021-11-27 RX ADMIN — PRAVASTATIN SODIUM 40 MG: 40 TABLET ORAL at 09:11

## 2021-11-27 RX ADMIN — HEPARIN SODIUM 12 UNITS/KG/HR: 10000 INJECTION, SOLUTION INTRAVENOUS at 11:11

## 2021-11-27 RX ADMIN — LOSARTAN POTASSIUM 100 MG: 50 TABLET, FILM COATED ORAL at 11:11

## 2021-11-27 RX ADMIN — NITROGLYCERIN 50 MCG/MIN: 20 INJECTION INTRAVENOUS at 11:11

## 2021-11-27 RX ADMIN — ENOXAPARIN SODIUM 100 MG: 100 INJECTION SUBCUTANEOUS at 05:11

## 2021-11-27 RX ADMIN — CARVEDILOL 6.25 MG: 6.25 TABLET, FILM COATED ORAL at 10:11

## 2021-11-28 PROBLEM — I21.4 NSTEMI (NON-ST ELEVATION MYOCARDIAL INFARCTION): Status: ACTIVE | Noted: 2021-11-28

## 2021-11-28 LAB
ALBUMIN SERPL BCP-MCNC: 3.3 G/DL (ref 3.5–5.2)
ALP SERPL-CCNC: 64 U/L (ref 55–135)
ALT SERPL W/O P-5'-P-CCNC: 21 U/L (ref 10–44)
ANION GAP SERPL CALC-SCNC: 11 MMOL/L (ref 8–16)
APTT BLDCRRT: 35.4 SEC (ref 21–32)
APTT BLDCRRT: 36.1 SEC (ref 21–32)
APTT BLDCRRT: 38.2 SEC (ref 21–32)
AST SERPL-CCNC: 41 U/L (ref 10–40)
BASOPHILS # BLD AUTO: 0.07 K/UL (ref 0–0.2)
BASOPHILS NFR BLD: 0.8 % (ref 0–1.9)
BILIRUB SERPL-MCNC: 0.6 MG/DL (ref 0.1–1)
BUN SERPL-MCNC: 28 MG/DL (ref 8–23)
CALCIUM SERPL-MCNC: 8.6 MG/DL (ref 8.7–10.5)
CHLORIDE SERPL-SCNC: 109 MMOL/L (ref 95–110)
CO2 SERPL-SCNC: 19 MMOL/L (ref 23–29)
CREAT SERPL-MCNC: 1.5 MG/DL (ref 0.5–1.4)
DIFFERENTIAL METHOD: ABNORMAL
EOSINOPHIL # BLD AUTO: 0.1 K/UL (ref 0–0.5)
EOSINOPHIL NFR BLD: 0.9 % (ref 0–8)
ERYTHROCYTE [DISTWIDTH] IN BLOOD BY AUTOMATED COUNT: 12.5 % (ref 11.5–14.5)
EST. GFR  (AFRICAN AMERICAN): 52.6 ML/MIN/1.73 M^2
EST. GFR  (NON AFRICAN AMERICAN): 45.5 ML/MIN/1.73 M^2
GLUCOSE SERPL-MCNC: 239 MG/DL (ref 70–110)
HCT VFR BLD AUTO: 31.1 % (ref 40–54)
HGB BLD-MCNC: 10.4 G/DL (ref 14–18)
IMM GRANULOCYTES # BLD AUTO: 0.03 K/UL (ref 0–0.04)
IMM GRANULOCYTES NFR BLD AUTO: 0.3 % (ref 0–0.5)
LYMPHOCYTES # BLD AUTO: 1 K/UL (ref 1–4.8)
LYMPHOCYTES NFR BLD: 11.8 % (ref 18–48)
MAGNESIUM SERPL-MCNC: 1.9 MG/DL (ref 1.6–2.6)
MCH RBC QN AUTO: 31.2 PG (ref 27–31)
MCHC RBC AUTO-ENTMCNC: 33.4 G/DL (ref 32–36)
MCV RBC AUTO: 93 FL (ref 82–98)
MONOCYTES # BLD AUTO: 0.7 K/UL (ref 0.3–1)
MONOCYTES NFR BLD: 7.6 % (ref 4–15)
NEUTROPHILS # BLD AUTO: 6.9 K/UL (ref 1.8–7.7)
NEUTROPHILS NFR BLD: 78.6 % (ref 38–73)
NRBC BLD-RTO: 0 /100 WBC
PHOSPHATE SERPL-MCNC: 3.6 MG/DL (ref 2.7–4.5)
PLATELET # BLD AUTO: 179 K/UL (ref 150–450)
PMV BLD AUTO: 10.2 FL (ref 9.2–12.9)
POCT GLUCOSE: 258 MG/DL (ref 70–110)
POCT GLUCOSE: 266 MG/DL (ref 70–110)
POCT GLUCOSE: 347 MG/DL (ref 70–110)
POCT GLUCOSE: 382 MG/DL (ref 70–110)
POTASSIUM SERPL-SCNC: 4.3 MMOL/L (ref 3.5–5.1)
PROT SERPL-MCNC: 5.5 G/DL (ref 6–8.4)
RBC # BLD AUTO: 3.33 M/UL (ref 4.6–6.2)
SODIUM SERPL-SCNC: 139 MMOL/L (ref 136–145)
WBC # BLD AUTO: 8.78 K/UL (ref 3.9–12.7)

## 2021-11-28 PROCEDURE — 97530 THERAPEUTIC ACTIVITIES: CPT

## 2021-11-28 PROCEDURE — 85025 COMPLETE CBC W/AUTO DIFF WBC: CPT | Performed by: STUDENT IN AN ORGANIZED HEALTH CARE EDUCATION/TRAINING PROGRAM

## 2021-11-28 PROCEDURE — 85730 THROMBOPLASTIN TIME PARTIAL: CPT | Mod: 91 | Performed by: INTERNAL MEDICINE

## 2021-11-28 PROCEDURE — 84100 ASSAY OF PHOSPHORUS: CPT | Performed by: INTERNAL MEDICINE

## 2021-11-28 PROCEDURE — 97161 PT EVAL LOW COMPLEX 20 MIN: CPT

## 2021-11-28 PROCEDURE — 83735 ASSAY OF MAGNESIUM: CPT | Performed by: INTERNAL MEDICINE

## 2021-11-28 PROCEDURE — 63600175 PHARM REV CODE 636 W HCPCS: Performed by: STUDENT IN AN ORGANIZED HEALTH CARE EDUCATION/TRAINING PROGRAM

## 2021-11-28 PROCEDURE — 97535 SELF CARE MNGMENT TRAINING: CPT

## 2021-11-28 PROCEDURE — 94761 N-INVAS EAR/PLS OXIMETRY MLT: CPT

## 2021-11-28 PROCEDURE — 20000000 HC ICU ROOM

## 2021-11-28 PROCEDURE — 25000003 PHARM REV CODE 250: Performed by: STUDENT IN AN ORGANIZED HEALTH CARE EDUCATION/TRAINING PROGRAM

## 2021-11-28 PROCEDURE — 80053 COMPREHEN METABOLIC PANEL: CPT | Performed by: INTERNAL MEDICINE

## 2021-11-28 PROCEDURE — 99231 PR SUBSEQUENT HOSPITAL CARE,LEVL I: ICD-10-PCS | Mod: GC,,, | Performed by: INTERNAL MEDICINE

## 2021-11-28 PROCEDURE — 97165 OT EVAL LOW COMPLEX 30 MIN: CPT

## 2021-11-28 PROCEDURE — 99231 SBSQ HOSP IP/OBS SF/LOW 25: CPT | Mod: GC,,, | Performed by: INTERNAL MEDICINE

## 2021-11-28 PROCEDURE — 99900035 HC TECH TIME PER 15 MIN (STAT)

## 2021-11-28 RX ADMIN — HEPARIN SODIUM AND DEXTROSE 16 UNITS/KG/HR: 10000; 5 INJECTION INTRAVENOUS at 05:11

## 2021-11-28 RX ADMIN — ISOSORBIDE DINITRATE 20 MG: 20 TABLET ORAL at 08:11

## 2021-11-28 RX ADMIN — AMLODIPINE BESYLATE 10 MG: 10 TABLET ORAL at 08:11

## 2021-11-28 RX ADMIN — CARVEDILOL 6.25 MG: 6.25 TABLET, FILM COATED ORAL at 08:11

## 2021-11-28 RX ADMIN — CLOPIDOGREL 75 MG: 75 TABLET, FILM COATED ORAL at 08:11

## 2021-11-28 RX ADMIN — INSULIN ASPART 10 UNITS: 100 INJECTION, SOLUTION INTRAVENOUS; SUBCUTANEOUS at 07:11

## 2021-11-28 RX ADMIN — HYDRALAZINE HYDROCHLORIDE 50 MG: 50 TABLET ORAL at 09:11

## 2021-11-28 RX ADMIN — INSULIN ASPART 6 UNITS: 100 INJECTION, SOLUTION INTRAVENOUS; SUBCUTANEOUS at 04:11

## 2021-11-28 RX ADMIN — ISOSORBIDE DINITRATE 20 MG: 20 TABLET ORAL at 03:11

## 2021-11-28 RX ADMIN — HYDRALAZINE HYDROCHLORIDE 50 MG: 50 TABLET ORAL at 05:11

## 2021-11-28 RX ADMIN — INSULIN ASPART 3 UNITS: 100 INJECTION, SOLUTION INTRAVENOUS; SUBCUTANEOUS at 08:11

## 2021-11-28 RX ADMIN — HYDRALAZINE HYDROCHLORIDE 50 MG: 50 TABLET ORAL at 03:11

## 2021-11-28 RX ADMIN — MAGNESIUM SULFATE HEPTAHYDRATE 1 G: 500 INJECTION, SOLUTION INTRAMUSCULAR; INTRAVENOUS at 12:11

## 2021-11-28 RX ADMIN — PRAVASTATIN SODIUM 40 MG: 40 TABLET ORAL at 08:11

## 2021-11-28 RX ADMIN — ASPIRIN 81 MG CHEWABLE TABLET 81 MG: 81 TABLET CHEWABLE at 08:11

## 2021-11-28 RX ADMIN — INSULIN ASPART 8 UNITS: 100 INJECTION, SOLUTION INTRAVENOUS; SUBCUTANEOUS at 11:11

## 2021-11-29 ENCOUNTER — TELEPHONE (OUTPATIENT)
Dept: FAMILY MEDICINE | Facility: CLINIC | Age: 71
End: 2021-11-29
Payer: MEDICARE

## 2021-11-29 LAB
ANION GAP SERPL CALC-SCNC: 10 MMOL/L (ref 8–16)
APTT BLDCRRT: 43.3 SEC (ref 21–32)
APTT BLDCRRT: 49.3 SEC (ref 21–32)
BASOPHILS # BLD AUTO: 0.06 K/UL (ref 0–0.2)
BASOPHILS NFR BLD: 0.7 % (ref 0–1.9)
BUN SERPL-MCNC: 27 MG/DL (ref 8–23)
CALCIUM SERPL-MCNC: 8.5 MG/DL (ref 8.7–10.5)
CHLORIDE SERPL-SCNC: 108 MMOL/L (ref 95–110)
CO2 SERPL-SCNC: 20 MMOL/L (ref 23–29)
CREAT SERPL-MCNC: 1.7 MG/DL (ref 0.5–1.4)
DIFFERENTIAL METHOD: ABNORMAL
EOSINOPHIL # BLD AUTO: 0.2 K/UL (ref 0–0.5)
EOSINOPHIL NFR BLD: 2.4 % (ref 0–8)
ERYTHROCYTE [DISTWIDTH] IN BLOOD BY AUTOMATED COUNT: 12.5 % (ref 11.5–14.5)
EST. GFR  (AFRICAN AMERICAN): 45.2 ML/MIN/1.73 M^2
EST. GFR  (NON AFRICAN AMERICAN): 39.1 ML/MIN/1.73 M^2
GLUCOSE SERPL-MCNC: 243 MG/DL (ref 70–110)
HCT VFR BLD AUTO: 31.7 % (ref 40–54)
HGB BLD-MCNC: 10.7 G/DL (ref 14–18)
IMM GRANULOCYTES # BLD AUTO: 0.01 K/UL (ref 0–0.04)
IMM GRANULOCYTES NFR BLD AUTO: 0.1 % (ref 0–0.5)
LYMPHOCYTES # BLD AUTO: 1.4 K/UL (ref 1–4.8)
LYMPHOCYTES NFR BLD: 16 % (ref 18–48)
MCH RBC QN AUTO: 31.4 PG (ref 27–31)
MCHC RBC AUTO-ENTMCNC: 33.8 G/DL (ref 32–36)
MCV RBC AUTO: 93 FL (ref 82–98)
MONOCYTES # BLD AUTO: 0.8 K/UL (ref 0.3–1)
MONOCYTES NFR BLD: 8.7 % (ref 4–15)
NEUTROPHILS # BLD AUTO: 6.4 K/UL (ref 1.8–7.7)
NEUTROPHILS NFR BLD: 72.1 % (ref 38–73)
NRBC BLD-RTO: 0 /100 WBC
PLATELET # BLD AUTO: 158 K/UL (ref 150–450)
PMV BLD AUTO: 9.9 FL (ref 9.2–12.9)
POCT GLUCOSE: 255 MG/DL (ref 70–110)
POCT GLUCOSE: 264 MG/DL (ref 70–110)
POCT GLUCOSE: 276 MG/DL (ref 70–110)
POCT GLUCOSE: 296 MG/DL (ref 70–110)
POTASSIUM SERPL-SCNC: 4.2 MMOL/L (ref 3.5–5.1)
RBC # BLD AUTO: 3.41 M/UL (ref 4.6–6.2)
SODIUM SERPL-SCNC: 138 MMOL/L (ref 136–145)
WBC # BLD AUTO: 8.86 K/UL (ref 3.9–12.7)

## 2021-11-29 PROCEDURE — 85730 THROMBOPLASTIN TIME PARTIAL: CPT | Performed by: STUDENT IN AN ORGANIZED HEALTH CARE EDUCATION/TRAINING PROGRAM

## 2021-11-29 PROCEDURE — 99233 SBSQ HOSP IP/OBS HIGH 50: CPT | Mod: GC,,, | Performed by: INTERNAL MEDICINE

## 2021-11-29 PROCEDURE — 25000003 PHARM REV CODE 250: Performed by: INTERNAL MEDICINE

## 2021-11-29 PROCEDURE — 80048 BASIC METABOLIC PNL TOTAL CA: CPT | Performed by: INTERNAL MEDICINE

## 2021-11-29 PROCEDURE — 99231 PR SUBSEQUENT HOSPITAL CARE,LEVL I: ICD-10-PCS | Mod: GC,,, | Performed by: INTERNAL MEDICINE

## 2021-11-29 PROCEDURE — 20000000 HC ICU ROOM

## 2021-11-29 PROCEDURE — 63600175 PHARM REV CODE 636 W HCPCS: Performed by: STUDENT IN AN ORGANIZED HEALTH CARE EDUCATION/TRAINING PROGRAM

## 2021-11-29 PROCEDURE — 94761 N-INVAS EAR/PLS OXIMETRY MLT: CPT

## 2021-11-29 PROCEDURE — 85025 COMPLETE CBC W/AUTO DIFF WBC: CPT | Performed by: STUDENT IN AN ORGANIZED HEALTH CARE EDUCATION/TRAINING PROGRAM

## 2021-11-29 PROCEDURE — 25000003 PHARM REV CODE 250: Performed by: STUDENT IN AN ORGANIZED HEALTH CARE EDUCATION/TRAINING PROGRAM

## 2021-11-29 PROCEDURE — 99233 PR SUBSEQUENT HOSPITAL CARE,LEVL III: ICD-10-PCS | Mod: GC,,, | Performed by: INTERNAL MEDICINE

## 2021-11-29 PROCEDURE — 99231 SBSQ HOSP IP/OBS SF/LOW 25: CPT | Mod: GC,,, | Performed by: INTERNAL MEDICINE

## 2021-11-29 RX ORDER — CARVEDILOL 12.5 MG/1
12.5 TABLET ORAL 2 TIMES DAILY
Status: DISCONTINUED | OUTPATIENT
Start: 2021-11-29 | End: 2021-12-01 | Stop reason: HOSPADM

## 2021-11-29 RX ORDER — SODIUM CHLORIDE 9 MG/ML
INJECTION, SOLUTION INTRAVENOUS CONTINUOUS
Status: ACTIVE | OUTPATIENT
Start: 2021-11-29 | End: 2021-11-30

## 2021-11-29 RX ORDER — ISOSORBIDE DINITRATE 20 MG/1
40 TABLET ORAL 3 TIMES DAILY
Status: DISCONTINUED | OUTPATIENT
Start: 2021-11-29 | End: 2021-12-01 | Stop reason: HOSPADM

## 2021-11-29 RX ORDER — ATORVASTATIN CALCIUM 20 MG/1
80 TABLET, FILM COATED ORAL DAILY
Status: DISCONTINUED | OUTPATIENT
Start: 2021-11-29 | End: 2021-12-01 | Stop reason: HOSPADM

## 2021-11-29 RX ADMIN — HYDRALAZINE HYDROCHLORIDE 50 MG: 50 TABLET ORAL at 05:11

## 2021-11-29 RX ADMIN — CLOPIDOGREL 75 MG: 75 TABLET, FILM COATED ORAL at 08:11

## 2021-11-29 RX ADMIN — HYDRALAZINE HYDROCHLORIDE 50 MG: 50 TABLET ORAL at 09:11

## 2021-11-29 RX ADMIN — AMLODIPINE BESYLATE 10 MG: 10 TABLET ORAL at 08:11

## 2021-11-29 RX ADMIN — HEPARIN SODIUM AND DEXTROSE 16.05 UNITS/KG/HR: 10000; 5 INJECTION INTRAVENOUS at 12:11

## 2021-11-29 RX ADMIN — INSULIN ASPART 6 UNITS: 100 INJECTION, SOLUTION INTRAVENOUS; SUBCUTANEOUS at 05:11

## 2021-11-29 RX ADMIN — INSULIN ASPART 6 UNITS: 100 INJECTION, SOLUTION INTRAVENOUS; SUBCUTANEOUS at 07:11

## 2021-11-29 RX ADMIN — ISOSORBIDE DINITRATE 40 MG: 20 TABLET ORAL at 03:11

## 2021-11-29 RX ADMIN — ISOSORBIDE DINITRATE 40 MG: 20 TABLET ORAL at 08:11

## 2021-11-29 RX ADMIN — CARVEDILOL 12.5 MG: 12.5 TABLET, FILM COATED ORAL at 08:11

## 2021-11-29 RX ADMIN — HYDRALAZINE HYDROCHLORIDE 50 MG: 50 TABLET ORAL at 03:11

## 2021-11-29 RX ADMIN — CARVEDILOL 12.5 MG: 12.5 TABLET, FILM COATED ORAL at 09:11

## 2021-11-29 RX ADMIN — ASPIRIN 81 MG CHEWABLE TABLET 81 MG: 81 TABLET CHEWABLE at 08:11

## 2021-11-29 RX ADMIN — INSULIN ASPART 6 UNITS: 100 INJECTION, SOLUTION INTRAVENOUS; SUBCUTANEOUS at 09:11

## 2021-11-29 RX ADMIN — SODIUM CHLORIDE 150 ML/HR: 0.9 INJECTION, SOLUTION INTRAVENOUS at 07:11

## 2021-11-29 RX ADMIN — ATORVASTATIN CALCIUM 80 MG: 20 TABLET, FILM COATED ORAL at 08:11

## 2021-11-29 RX ADMIN — ISOSORBIDE DINITRATE 40 MG: 20 TABLET ORAL at 09:11

## 2021-11-29 RX ADMIN — INSULIN ASPART 6 UNITS: 100 INJECTION, SOLUTION INTRAVENOUS; SUBCUTANEOUS at 12:11

## 2021-11-30 PROBLEM — D64.9 ANEMIA: Status: ACTIVE | Noted: 2021-11-30

## 2021-11-30 LAB
ANION GAP SERPL CALC-SCNC: 7 MMOL/L (ref 8–16)
ANION GAP SERPL CALC-SCNC: 8 MMOL/L (ref 8–16)
APTT BLDCRRT: 42.6 SEC (ref 21–32)
BASOPHILS # BLD AUTO: 0.05 K/UL (ref 0–0.2)
BASOPHILS NFR BLD: 0.7 % (ref 0–1.9)
BUN SERPL-MCNC: 25 MG/DL (ref 8–23)
BUN SERPL-MCNC: 27 MG/DL (ref 8–23)
CALCIUM SERPL-MCNC: 8.1 MG/DL (ref 8.7–10.5)
CALCIUM SERPL-MCNC: 8.8 MG/DL (ref 8.7–10.5)
CHLORIDE SERPL-SCNC: 107 MMOL/L (ref 95–110)
CHLORIDE SERPL-SCNC: 108 MMOL/L (ref 95–110)
CO2 SERPL-SCNC: 18 MMOL/L (ref 23–29)
CO2 SERPL-SCNC: 20 MMOL/L (ref 23–29)
CREAT SERPL-MCNC: 1.5 MG/DL (ref 0.5–1.4)
CREAT SERPL-MCNC: 1.7 MG/DL (ref 0.5–1.4)
DIFFERENTIAL METHOD: ABNORMAL
EOSINOPHIL # BLD AUTO: 0.2 K/UL (ref 0–0.5)
EOSINOPHIL NFR BLD: 3 % (ref 0–8)
ERYTHROCYTE [DISTWIDTH] IN BLOOD BY AUTOMATED COUNT: 12.3 % (ref 11.5–14.5)
EST. GFR  (AFRICAN AMERICAN): 45.2 ML/MIN/1.73 M^2
EST. GFR  (AFRICAN AMERICAN): 52.6 ML/MIN/1.73 M^2
EST. GFR  (NON AFRICAN AMERICAN): 39.1 ML/MIN/1.73 M^2
EST. GFR  (NON AFRICAN AMERICAN): 45.5 ML/MIN/1.73 M^2
FERRITIN SERPL-MCNC: 142 NG/ML (ref 20–300)
GLUCOSE SERPL-MCNC: 243 MG/DL (ref 70–110)
GLUCOSE SERPL-MCNC: 250 MG/DL (ref 70–110)
HCT VFR BLD AUTO: 29.3 % (ref 40–54)
HCT VFR BLD AUTO: 30.3 % (ref 40–54)
HGB BLD-MCNC: 10.3 G/DL (ref 14–18)
HGB BLD-MCNC: 9.8 G/DL (ref 14–18)
IMM GRANULOCYTES # BLD AUTO: 0.02 K/UL (ref 0–0.04)
IMM GRANULOCYTES NFR BLD AUTO: 0.3 % (ref 0–0.5)
IRON SERPL-MCNC: 49 UG/DL (ref 45–160)
LYMPHOCYTES # BLD AUTO: 1.4 K/UL (ref 1–4.8)
LYMPHOCYTES NFR BLD: 18.5 % (ref 18–48)
MCH RBC QN AUTO: 31 PG (ref 27–31)
MCHC RBC AUTO-ENTMCNC: 33.4 G/DL (ref 32–36)
MCV RBC AUTO: 93 FL (ref 82–98)
MONOCYTES # BLD AUTO: 0.6 K/UL (ref 0.3–1)
MONOCYTES NFR BLD: 8.4 % (ref 4–15)
NEUTROPHILS # BLD AUTO: 5.1 K/UL (ref 1.8–7.7)
NEUTROPHILS NFR BLD: 69.1 % (ref 38–73)
NRBC BLD-RTO: 0 /100 WBC
PLATELET # BLD AUTO: 172 K/UL (ref 150–450)
PMV BLD AUTO: 10.1 FL (ref 9.2–12.9)
POCT GLUCOSE: 256 MG/DL (ref 70–110)
POCT GLUCOSE: 292 MG/DL (ref 70–110)
POCT GLUCOSE: 299 MG/DL (ref 70–110)
POCT GLUCOSE: 376 MG/DL (ref 70–110)
POCT GLUCOSE: 381 MG/DL (ref 70–110)
POTASSIUM SERPL-SCNC: 4.1 MMOL/L (ref 3.5–5.1)
POTASSIUM SERPL-SCNC: 4.2 MMOL/L (ref 3.5–5.1)
RBC # BLD AUTO: 3.16 M/UL (ref 4.6–6.2)
SATURATED IRON: 15 % (ref 20–50)
SODIUM SERPL-SCNC: 133 MMOL/L (ref 136–145)
SODIUM SERPL-SCNC: 135 MMOL/L (ref 136–145)
TOTAL IRON BINDING CAPACITY: 326 UG/DL (ref 250–450)
TRANSFERRIN SERPL-MCNC: 220 MG/DL (ref 200–375)
WBC # BLD AUTO: 7.37 K/UL (ref 3.9–12.7)

## 2021-11-30 PROCEDURE — 25000003 PHARM REV CODE 250: Performed by: INTERNAL MEDICINE

## 2021-11-30 PROCEDURE — 25500020 PHARM REV CODE 255: Performed by: INTERNAL MEDICINE

## 2021-11-30 PROCEDURE — 92978 ENDOLUMINL IVUS OCT C 1ST: CPT | Mod: 26,RC,, | Performed by: INTERNAL MEDICINE

## 2021-11-30 PROCEDURE — 80048 BASIC METABOLIC PNL TOTAL CA: CPT | Mod: 91 | Performed by: INTERNAL MEDICINE

## 2021-11-30 PROCEDURE — 93454 CORONARY ARTERY ANGIO S&I: CPT | Mod: 26,51,59 | Performed by: INTERNAL MEDICINE

## 2021-11-30 PROCEDURE — 92929 PR STENT, ADD'L VESSEL: ICD-10-PCS | Mod: RC,,, | Performed by: INTERNAL MEDICINE

## 2021-11-30 PROCEDURE — 25000003 PHARM REV CODE 250: Performed by: STUDENT IN AN ORGANIZED HEALTH CARE EDUCATION/TRAINING PROGRAM

## 2021-11-30 PROCEDURE — 92928 PRQ TCAT PLMT NTRAC ST 1 LES: CPT | Mod: RC,,, | Performed by: INTERNAL MEDICINE

## 2021-11-30 PROCEDURE — C1887 CATHETER, GUIDING: HCPCS | Performed by: INTERNAL MEDICINE

## 2021-11-30 PROCEDURE — 82728 ASSAY OF FERRITIN: CPT | Performed by: STUDENT IN AN ORGANIZED HEALTH CARE EDUCATION/TRAINING PROGRAM

## 2021-11-30 PROCEDURE — 85730 THROMBOPLASTIN TIME PARTIAL: CPT | Performed by: STUDENT IN AN ORGANIZED HEALTH CARE EDUCATION/TRAINING PROGRAM

## 2021-11-30 PROCEDURE — C9601 PERC DRUG-EL COR STENT BRAN: HCPCS | Mod: RC | Performed by: INTERNAL MEDICINE

## 2021-11-30 PROCEDURE — 99153 MOD SED SAME PHYS/QHP EA: CPT | Performed by: INTERNAL MEDICINE

## 2021-11-30 PROCEDURE — 93010 EKG 12-LEAD: ICD-10-PCS | Mod: ,,, | Performed by: INTERNAL MEDICINE

## 2021-11-30 PROCEDURE — 63600175 PHARM REV CODE 636 W HCPCS: Performed by: INTERNAL MEDICINE

## 2021-11-30 PROCEDURE — C1874 STENT, COATED/COV W/DEL SYS: HCPCS | Performed by: INTERNAL MEDICINE

## 2021-11-30 PROCEDURE — 20000000 HC ICU ROOM

## 2021-11-30 PROCEDURE — 63600175 PHARM REV CODE 636 W HCPCS

## 2021-11-30 PROCEDURE — 94761 N-INVAS EAR/PLS OXIMETRY MLT: CPT

## 2021-11-30 PROCEDURE — 93010 ELECTROCARDIOGRAM REPORT: CPT | Mod: ,,, | Performed by: INTERNAL MEDICINE

## 2021-11-30 PROCEDURE — 92928 PR STENT: ICD-10-PCS | Mod: RC,,, | Performed by: INTERNAL MEDICINE

## 2021-11-30 PROCEDURE — 99152 MOD SED SAME PHYS/QHP 5/>YRS: CPT | Mod: ,,, | Performed by: INTERNAL MEDICINE

## 2021-11-30 PROCEDURE — 93005 ELECTROCARDIOGRAM TRACING: CPT

## 2021-11-30 PROCEDURE — 85347 COAGULATION TIME ACTIVATED: CPT | Performed by: INTERNAL MEDICINE

## 2021-11-30 PROCEDURE — 92978 ENDOLUMINL IVUS OCT C 1ST: CPT | Mod: 26,RC | Performed by: INTERNAL MEDICINE

## 2021-11-30 PROCEDURE — C1769 GUIDE WIRE: HCPCS | Performed by: INTERNAL MEDICINE

## 2021-11-30 PROCEDURE — 25000003 PHARM REV CODE 250

## 2021-11-30 PROCEDURE — 99152 MOD SED SAME PHYS/QHP 5/>YRS: CPT | Performed by: INTERNAL MEDICINE

## 2021-11-30 PROCEDURE — 99900035 HC TECH TIME PER 15 MIN (STAT)

## 2021-11-30 PROCEDURE — 92978 PR IVUS, CORONARY, 1ST VESSEL: ICD-10-PCS | Mod: 26,RC,, | Performed by: INTERNAL MEDICINE

## 2021-11-30 PROCEDURE — 99152 PR MOD CONSCIOUS SEDATION, SAME PHYS, 5+ YRS, FIRST 15 MIN: ICD-10-PCS | Mod: ,,, | Performed by: INTERNAL MEDICINE

## 2021-11-30 PROCEDURE — C1894 INTRO/SHEATH, NON-LASER: HCPCS | Performed by: INTERNAL MEDICINE

## 2021-11-30 PROCEDURE — 93454 PR CATH PLACE/CORONARY ANGIO, IMG SUPER/INTERP: ICD-10-PCS | Mod: 26,51,59, | Performed by: INTERNAL MEDICINE

## 2021-11-30 PROCEDURE — 27201423 OPTIME MED/SURG SUP & DEVICES STERILE SUPPLY: Performed by: INTERNAL MEDICINE

## 2021-11-30 PROCEDURE — C9600 PERC DRUG-EL COR STENT SING: HCPCS | Mod: RC | Performed by: INTERNAL MEDICINE

## 2021-11-30 PROCEDURE — C1725 CATH, TRANSLUMIN NON-LASER: HCPCS | Performed by: INTERNAL MEDICINE

## 2021-11-30 PROCEDURE — 99231 SBSQ HOSP IP/OBS SF/LOW 25: CPT | Mod: GC,,, | Performed by: INTERNAL MEDICINE

## 2021-11-30 PROCEDURE — 92929 PR STENT, ADD'L VESSEL: CPT | Mod: RC,,, | Performed by: INTERNAL MEDICINE

## 2021-11-30 PROCEDURE — 99231 PR SUBSEQUENT HOSPITAL CARE,LEVL I: ICD-10-PCS | Mod: GC,,, | Performed by: INTERNAL MEDICINE

## 2021-11-30 PROCEDURE — 85014 HEMATOCRIT: CPT | Performed by: INTERNAL MEDICINE

## 2021-11-30 PROCEDURE — C1760 CLOSURE DEV, VASC: HCPCS | Performed by: INTERNAL MEDICINE

## 2021-11-30 PROCEDURE — 63600175 PHARM REV CODE 636 W HCPCS: Performed by: STUDENT IN AN ORGANIZED HEALTH CARE EDUCATION/TRAINING PROGRAM

## 2021-11-30 PROCEDURE — C1753 CATH, INTRAVAS ULTRASOUND: HCPCS | Performed by: INTERNAL MEDICINE

## 2021-11-30 PROCEDURE — 84466 ASSAY OF TRANSFERRIN: CPT | Performed by: STUDENT IN AN ORGANIZED HEALTH CARE EDUCATION/TRAINING PROGRAM

## 2021-11-30 PROCEDURE — 80048 BASIC METABOLIC PNL TOTAL CA: CPT | Performed by: INTERNAL MEDICINE

## 2021-11-30 PROCEDURE — 85025 COMPLETE CBC W/AUTO DIFF WBC: CPT | Performed by: STUDENT IN AN ORGANIZED HEALTH CARE EDUCATION/TRAINING PROGRAM

## 2021-11-30 PROCEDURE — 93454 CORONARY ARTERY ANGIO S&I: CPT | Mod: 26,51,59, | Performed by: INTERNAL MEDICINE

## 2021-11-30 PROCEDURE — 85018 HEMOGLOBIN: CPT | Performed by: INTERNAL MEDICINE

## 2021-11-30 DEVICE — STENT RONYX25015UX RESOLUTE ONYX 2.50X15
Type: IMPLANTABLE DEVICE | Site: CORONARY | Status: FUNCTIONAL
Brand: RESOLUTE ONYX™

## 2021-11-30 DEVICE — STENT RONYX40038UX RESOLUTE ONYX 4.00X38
Type: IMPLANTABLE DEVICE | Site: CORONARY | Status: FUNCTIONAL
Brand: RESOLUTE ONYX™

## 2021-11-30 DEVICE — STENT RONYX27522UX RESOLUTE ONYX 2.75X22
Type: IMPLANTABLE DEVICE | Site: CORONARY | Status: FUNCTIONAL
Brand: RESOLUTE ONYX™

## 2021-11-30 DEVICE — STENT RONYX30026UX RESOLUTE ONYX 3.00X26
Type: IMPLANTABLE DEVICE | Site: CORONARY | Status: FUNCTIONAL
Brand: RESOLUTE ONYX™

## 2021-11-30 RX ORDER — MIDAZOLAM HYDROCHLORIDE 1 MG/ML
INJECTION, SOLUTION INTRAMUSCULAR; INTRAVENOUS
Status: DISCONTINUED | OUTPATIENT
Start: 2021-11-30 | End: 2021-11-30 | Stop reason: HOSPADM

## 2021-11-30 RX ORDER — LIDOCAINE HYDROCHLORIDE 20 MG/ML
10 SOLUTION OROPHARYNGEAL ONCE
Status: COMPLETED | OUTPATIENT
Start: 2021-11-30 | End: 2021-11-30

## 2021-11-30 RX ORDER — HYDRALAZINE HYDROCHLORIDE 20 MG/ML
10 INJECTION INTRAMUSCULAR; INTRAVENOUS ONCE
Status: COMPLETED | OUTPATIENT
Start: 2021-11-30 | End: 2021-11-30

## 2021-11-30 RX ORDER — ONDANSETRON 2 MG/ML
INJECTION INTRAMUSCULAR; INTRAVENOUS
Status: DISCONTINUED | OUTPATIENT
Start: 2021-11-30 | End: 2021-11-30 | Stop reason: HOSPADM

## 2021-11-30 RX ORDER — CEFAZOLIN SODIUM 1 G/3ML
INJECTION, POWDER, FOR SOLUTION INTRAMUSCULAR; INTRAVENOUS
Status: DISCONTINUED | OUTPATIENT
Start: 2021-11-30 | End: 2021-11-30 | Stop reason: HOSPADM

## 2021-11-30 RX ORDER — ACETAMINOPHEN 325 MG/1
TABLET ORAL
Status: COMPLETED
Start: 2021-11-30 | End: 2021-11-30

## 2021-11-30 RX ORDER — ALUMINUM HYDROXIDE, MAGNESIUM HYDROXIDE, AND SIMETHICONE 2400; 240; 2400 MG/30ML; MG/30ML; MG/30ML
30 SUSPENSION ORAL ONCE
Status: COMPLETED | OUTPATIENT
Start: 2021-11-30 | End: 2021-11-30

## 2021-11-30 RX ORDER — SODIUM CHLORIDE 9 MG/ML
INJECTION, SOLUTION INTRAVENOUS CONTINUOUS
Status: ACTIVE | OUTPATIENT
Start: 2021-11-30 | End: 2021-11-30

## 2021-11-30 RX ORDER — DIPHENHYDRAMINE HCL 50 MG
50 CAPSULE ORAL ONCE
Status: COMPLETED | OUTPATIENT
Start: 2021-11-30 | End: 2021-11-30

## 2021-11-30 RX ORDER — ONDANSETRON 8 MG/1
8 TABLET, ORALLY DISINTEGRATING ORAL EVERY 8 HOURS PRN
Status: DISCONTINUED | OUTPATIENT
Start: 2021-11-30 | End: 2021-12-01 | Stop reason: HOSPADM

## 2021-11-30 RX ORDER — NITROGLYCERIN 400 UG/1
1 SPRAY ORAL EVERY 5 MIN PRN
Status: DISCONTINUED | OUTPATIENT
Start: 2021-11-30 | End: 2021-12-01 | Stop reason: HOSPADM

## 2021-11-30 RX ORDER — FENTANYL CITRATE 50 UG/ML
INJECTION, SOLUTION INTRAMUSCULAR; INTRAVENOUS
Status: DISCONTINUED | OUTPATIENT
Start: 2021-11-30 | End: 2021-11-30 | Stop reason: HOSPADM

## 2021-11-30 RX ORDER — PANTOPRAZOLE SODIUM 40 MG/1
40 TABLET, DELAYED RELEASE ORAL DAILY
Status: DISCONTINUED | OUTPATIENT
Start: 2021-11-30 | End: 2021-12-01 | Stop reason: HOSPADM

## 2021-11-30 RX ORDER — ACETAMINOPHEN 325 MG/1
650 TABLET ORAL EVERY 6 HOURS PRN
Status: DISCONTINUED | OUTPATIENT
Start: 2021-11-30 | End: 2021-12-01 | Stop reason: HOSPADM

## 2021-11-30 RX ORDER — HYDRALAZINE HYDROCHLORIDE 20 MG/ML
INJECTION INTRAMUSCULAR; INTRAVENOUS
Status: COMPLETED
Start: 2021-11-30 | End: 2021-11-30

## 2021-11-30 RX ORDER — HEPARIN SODIUM 1000 [USP'U]/ML
INJECTION, SOLUTION INTRAVENOUS; SUBCUTANEOUS
Status: DISCONTINUED | OUTPATIENT
Start: 2021-11-30 | End: 2021-11-30 | Stop reason: HOSPADM

## 2021-11-30 RX ADMIN — CARVEDILOL 12.5 MG: 12.5 TABLET, FILM COATED ORAL at 08:11

## 2021-11-30 RX ADMIN — ISOSORBIDE DINITRATE 40 MG: 20 TABLET ORAL at 08:11

## 2021-11-30 RX ADMIN — HYDRALAZINE HYDROCHLORIDE 10 MG: 20 INJECTION, SOLUTION INTRAMUSCULAR; INTRAVENOUS at 08:11

## 2021-11-30 RX ADMIN — AMLODIPINE BESYLATE 10 MG: 10 TABLET ORAL at 08:11

## 2021-11-30 RX ADMIN — HYDRALAZINE HYDROCHLORIDE 50 MG: 50 TABLET ORAL at 02:11

## 2021-11-30 RX ADMIN — INSULIN ASPART 6 UNITS: 100 INJECTION, SOLUTION INTRAVENOUS; SUBCUTANEOUS at 06:11

## 2021-11-30 RX ADMIN — HYDRALAZINE HYDROCHLORIDE 50 MG: 50 TABLET ORAL at 09:11

## 2021-11-30 RX ADMIN — PANTOPRAZOLE SODIUM 40 MG: 40 TABLET, DELAYED RELEASE ORAL at 12:11

## 2021-11-30 RX ADMIN — ASPIRIN 81 MG CHEWABLE TABLET 81 MG: 81 TABLET CHEWABLE at 08:11

## 2021-11-30 RX ADMIN — ACETAMINOPHEN: 325 TABLET ORAL at 08:11

## 2021-11-30 RX ADMIN — INSULIN ASPART 5 UNITS: 100 INJECTION, SOLUTION INTRAVENOUS; SUBCUTANEOUS at 09:11

## 2021-11-30 RX ADMIN — CLOPIDOGREL 75 MG: 75 TABLET, FILM COATED ORAL at 08:11

## 2021-11-30 RX ADMIN — ACETAMINOPHEN 650 MG: 325 TABLET ORAL at 08:11

## 2021-11-30 RX ADMIN — HYDRALAZINE HYDROCHLORIDE 50 MG: 50 TABLET ORAL at 05:11

## 2021-11-30 RX ADMIN — SODIUM CHLORIDE: 0.9 INJECTION, SOLUTION INTRAVENOUS at 02:11

## 2021-11-30 RX ADMIN — INSULIN ASPART 6 UNITS: 100 INJECTION, SOLUTION INTRAVENOUS; SUBCUTANEOUS at 12:11

## 2021-11-30 RX ADMIN — ALUMINUM HYDROXIDE, MAGNESIUM HYDROXIDE, AND DIMETHICONE 30 ML: 400; 400; 40 SUSPENSION ORAL at 06:11

## 2021-11-30 RX ADMIN — LIDOCAINE HYDROCHLORIDE 10 ML: 20 SOLUTION ORAL; TOPICAL at 06:11

## 2021-11-30 RX ADMIN — ISOSORBIDE DINITRATE 40 MG: 20 TABLET ORAL at 02:11

## 2021-11-30 RX ADMIN — ATORVASTATIN CALCIUM 80 MG: 20 TABLET, FILM COATED ORAL at 08:11

## 2021-11-30 RX ADMIN — SODIUM CHLORIDE 1000 ML: 0.9 INJECTION, SOLUTION INTRAVENOUS at 05:11

## 2021-11-30 RX ADMIN — ISOSORBIDE DINITRATE 40 MG: 20 TABLET ORAL at 09:11

## 2021-11-30 RX ADMIN — CARVEDILOL 12.5 MG: 12.5 TABLET, FILM COATED ORAL at 09:11

## 2021-11-30 RX ADMIN — DIPHENHYDRAMINE HYDROCHLORIDE 50 MG: 50 CAPSULE ORAL at 02:11

## 2021-11-30 RX ADMIN — INSULIN ASPART 6 UNITS: 100 INJECTION, SOLUTION INTRAVENOUS; SUBCUTANEOUS at 08:11

## 2021-12-01 ENCOUNTER — TELEPHONE (OUTPATIENT)
Dept: CARDIAC REHAB | Facility: CLINIC | Age: 71
End: 2021-12-01
Payer: MEDICARE

## 2021-12-01 ENCOUNTER — TELEPHONE (OUTPATIENT)
Dept: FAMILY MEDICINE | Facility: CLINIC | Age: 71
End: 2021-12-01
Payer: MEDICARE

## 2021-12-01 VITALS
DIASTOLIC BLOOD PRESSURE: 62 MMHG | HEIGHT: 69 IN | OXYGEN SATURATION: 97 % | TEMPERATURE: 98 F | RESPIRATION RATE: 22 BRPM | SYSTOLIC BLOOD PRESSURE: 130 MMHG | BODY MASS INDEX: 32.82 KG/M2 | HEART RATE: 66 BPM | WEIGHT: 221.56 LBS

## 2021-12-01 PROBLEM — N18.30 CKD STAGE 3 DUE TO TYPE 2 DIABETES MELLITUS: Status: ACTIVE | Noted: 2021-12-01

## 2021-12-01 PROBLEM — E11.22 CKD STAGE 3 DUE TO TYPE 2 DIABETES MELLITUS: Status: ACTIVE | Noted: 2021-12-01

## 2021-12-01 LAB
ANION GAP SERPL CALC-SCNC: 9 MMOL/L (ref 8–16)
BASOPHILS # BLD AUTO: 0.04 K/UL (ref 0–0.2)
BASOPHILS NFR BLD: 0.6 % (ref 0–1.9)
BUN SERPL-MCNC: 23 MG/DL (ref 8–23)
CALCIUM SERPL-MCNC: 8.2 MG/DL (ref 8.7–10.5)
CHLORIDE SERPL-SCNC: 112 MMOL/L (ref 95–110)
CO2 SERPL-SCNC: 19 MMOL/L (ref 23–29)
CREAT SERPL-MCNC: 1.6 MG/DL (ref 0.5–1.4)
DIFFERENTIAL METHOD: ABNORMAL
EOSINOPHIL # BLD AUTO: 0.1 K/UL (ref 0–0.5)
EOSINOPHIL NFR BLD: 1.3 % (ref 0–8)
ERYTHROCYTE [DISTWIDTH] IN BLOOD BY AUTOMATED COUNT: 12.8 % (ref 11.5–14.5)
EST. GFR  (AFRICAN AMERICAN): 48.7 ML/MIN/1.73 M^2
EST. GFR  (NON AFRICAN AMERICAN): 42.1 ML/MIN/1.73 M^2
GLUCOSE SERPL-MCNC: 259 MG/DL (ref 70–110)
HCT VFR BLD AUTO: 27.1 % (ref 40–54)
HGB BLD-MCNC: 9.2 G/DL (ref 14–18)
IMM GRANULOCYTES # BLD AUTO: 0.02 K/UL (ref 0–0.04)
IMM GRANULOCYTES NFR BLD AUTO: 0.3 % (ref 0–0.5)
LYMPHOCYTES # BLD AUTO: 1.3 K/UL (ref 1–4.8)
LYMPHOCYTES NFR BLD: 18.6 % (ref 18–48)
MCH RBC QN AUTO: 31.2 PG (ref 27–31)
MCHC RBC AUTO-ENTMCNC: 33.9 G/DL (ref 32–36)
MCV RBC AUTO: 92 FL (ref 82–98)
MONOCYTES # BLD AUTO: 0.6 K/UL (ref 0.3–1)
MONOCYTES NFR BLD: 8.1 % (ref 4–15)
NEUTROPHILS # BLD AUTO: 5 K/UL (ref 1.8–7.7)
NEUTROPHILS NFR BLD: 71.1 % (ref 38–73)
NRBC BLD-RTO: 0 /100 WBC
PLATELET # BLD AUTO: 169 K/UL (ref 150–450)
PMV BLD AUTO: 10.2 FL (ref 9.2–12.9)
POC ACTIVATED CLOTTING TIME K: 202 SEC (ref 74–137)
POC ACTIVATED CLOTTING TIME K: 243 SEC (ref 74–137)
POTASSIUM SERPL-SCNC: 4.2 MMOL/L (ref 3.5–5.1)
RBC # BLD AUTO: 2.95 M/UL (ref 4.6–6.2)
SAMPLE: ABNORMAL
SAMPLE: ABNORMAL
SODIUM SERPL-SCNC: 140 MMOL/L (ref 136–145)
WBC # BLD AUTO: 7.05 K/UL (ref 3.9–12.7)

## 2021-12-01 PROCEDURE — 94761 N-INVAS EAR/PLS OXIMETRY MLT: CPT

## 2021-12-01 PROCEDURE — 80048 BASIC METABOLIC PNL TOTAL CA: CPT | Performed by: INTERNAL MEDICINE

## 2021-12-01 PROCEDURE — 85025 COMPLETE CBC W/AUTO DIFF WBC: CPT | Performed by: STUDENT IN AN ORGANIZED HEALTH CARE EDUCATION/TRAINING PROGRAM

## 2021-12-01 PROCEDURE — 99239 PR HOSPITAL DISCHARGE DAY,>30 MIN: ICD-10-PCS | Mod: GC,,, | Performed by: INTERNAL MEDICINE

## 2021-12-01 PROCEDURE — 99239 HOSP IP/OBS DSCHRG MGMT >30: CPT | Mod: GC,,, | Performed by: INTERNAL MEDICINE

## 2021-12-01 PROCEDURE — 25000003 PHARM REV CODE 250: Performed by: STUDENT IN AN ORGANIZED HEALTH CARE EDUCATION/TRAINING PROGRAM

## 2021-12-01 PROCEDURE — 25000003 PHARM REV CODE 250: Performed by: INTERNAL MEDICINE

## 2021-12-01 PROCEDURE — 99900035 HC TECH TIME PER 15 MIN (STAT)

## 2021-12-01 RX ORDER — METFORMIN HYDROCHLORIDE 1000 MG/1
1000 TABLET ORAL 2 TIMES DAILY WITH MEALS
Qty: 180 TABLET | Refills: 1 | Status: SHIPPED | OUTPATIENT
Start: 2021-12-01 | End: 2022-04-04 | Stop reason: SDUPTHER

## 2021-12-01 RX ORDER — VALSARTAN 80 MG/1
80 TABLET ORAL 2 TIMES DAILY
Qty: 180 TABLET | Refills: 3 | Status: SHIPPED | OUTPATIENT
Start: 2021-12-01 | End: 2022-02-21

## 2021-12-01 RX ORDER — CLOPIDOGREL BISULFATE 75 MG/1
75 TABLET ORAL DAILY
Qty: 30 TABLET | Refills: 11 | Status: SHIPPED | OUTPATIENT
Start: 2021-12-02 | End: 2022-01-06 | Stop reason: SDUPTHER

## 2021-12-01 RX ORDER — METFORMIN HYDROCHLORIDE 1000 MG/1
1000 TABLET ORAL 2 TIMES DAILY WITH MEALS
Qty: 180 TABLET | Refills: 1 | Status: SHIPPED | OUTPATIENT
Start: 2021-12-01 | End: 2021-12-01 | Stop reason: SDUPTHER

## 2021-12-01 RX ORDER — CARVEDILOL 12.5 MG/1
12.5 TABLET ORAL 2 TIMES DAILY
Qty: 60 TABLET | Refills: 11 | Status: SHIPPED | OUTPATIENT
Start: 2021-12-01 | End: 2022-02-21

## 2021-12-01 RX ORDER — ATORVASTATIN CALCIUM 80 MG/1
80 TABLET, FILM COATED ORAL DAILY
Qty: 90 TABLET | Refills: 3 | Status: SHIPPED | OUTPATIENT
Start: 2021-12-02 | End: 2022-02-21

## 2021-12-01 RX ORDER — ASPIRIN 81 MG/1
81 TABLET ORAL DAILY
Qty: 30 TABLET | Refills: 11 | Status: SHIPPED | OUTPATIENT
Start: 2021-12-01

## 2021-12-01 RX ADMIN — PANTOPRAZOLE SODIUM 40 MG: 40 TABLET, DELAYED RELEASE ORAL at 08:12

## 2021-12-01 RX ADMIN — ASPIRIN 81 MG CHEWABLE TABLET 81 MG: 81 TABLET CHEWABLE at 08:12

## 2021-12-01 RX ADMIN — AMLODIPINE BESYLATE 10 MG: 10 TABLET ORAL at 08:12

## 2021-12-01 RX ADMIN — ISOSORBIDE DINITRATE 40 MG: 20 TABLET ORAL at 08:12

## 2021-12-01 RX ADMIN — CLOPIDOGREL 75 MG: 75 TABLET, FILM COATED ORAL at 08:12

## 2021-12-01 RX ADMIN — ATORVASTATIN CALCIUM 80 MG: 20 TABLET, FILM COATED ORAL at 08:12

## 2021-12-01 RX ADMIN — HYDRALAZINE HYDROCHLORIDE 50 MG: 50 TABLET ORAL at 05:12

## 2021-12-01 RX ADMIN — CARVEDILOL 12.5 MG: 12.5 TABLET, FILM COATED ORAL at 08:12

## 2021-12-01 RX ADMIN — INSULIN ASPART 6 UNITS: 100 INJECTION, SOLUTION INTRAVENOUS; SUBCUTANEOUS at 08:12

## 2021-12-02 ENCOUNTER — TELEPHONE (OUTPATIENT)
Dept: FAMILY MEDICINE | Facility: CLINIC | Age: 71
End: 2021-12-02
Payer: MEDICARE

## 2021-12-02 ENCOUNTER — PATIENT OUTREACH (OUTPATIENT)
Dept: FAMILY MEDICINE | Facility: CLINIC | Age: 71
End: 2021-12-02
Payer: MEDICARE

## 2021-12-13 ENCOUNTER — OFFICE VISIT (OUTPATIENT)
Dept: FAMILY MEDICINE | Facility: CLINIC | Age: 71
End: 2021-12-13
Payer: MEDICARE

## 2021-12-13 VITALS
HEART RATE: 84 BPM | HEIGHT: 69 IN | SYSTOLIC BLOOD PRESSURE: 162 MMHG | WEIGHT: 222 LBS | BODY MASS INDEX: 32.88 KG/M2 | DIASTOLIC BLOOD PRESSURE: 100 MMHG

## 2021-12-13 DIAGNOSIS — I10 HTN (HYPERTENSION), BENIGN: ICD-10-CM

## 2021-12-13 DIAGNOSIS — E78.2 MIXED HYPERLIPIDEMIA: ICD-10-CM

## 2021-12-13 DIAGNOSIS — N52.9 ERECTILE DYSFUNCTION, UNSPECIFIED ERECTILE DYSFUNCTION TYPE: ICD-10-CM

## 2021-12-13 DIAGNOSIS — N18.30 CKD STAGE 3 DUE TO TYPE 2 DIABETES MELLITUS: ICD-10-CM

## 2021-12-13 DIAGNOSIS — E11.22 CKD STAGE 3 DUE TO TYPE 2 DIABETES MELLITUS: ICD-10-CM

## 2021-12-13 DIAGNOSIS — E11.9 TYPE 2 DIABETES MELLITUS WITHOUT COMPLICATION, UNSPECIFIED WHETHER LONG TERM INSULIN USE: ICD-10-CM

## 2021-12-13 DIAGNOSIS — Z12.11 SPECIAL SCREENING FOR MALIGNANT NEOPLASMS, COLON: Primary | ICD-10-CM

## 2021-12-13 DIAGNOSIS — I25.10 CORONARY ARTERY DISEASE, UNSPECIFIED VESSEL OR LESION TYPE, UNSPECIFIED WHETHER ANGINA PRESENT, UNSPECIFIED WHETHER NATIVE OR TRANSPLANTED HEART: ICD-10-CM

## 2021-12-13 DIAGNOSIS — Z86.718 HISTORY OF DVT (DEEP VEIN THROMBOSIS): ICD-10-CM

## 2021-12-13 DIAGNOSIS — E78.5 HYPERLIPIDEMIA WITH TARGET LOW DENSITY LIPOPROTEIN (LDL) CHOLESTEROL LESS THAN 100 MG/DL: ICD-10-CM

## 2021-12-13 DIAGNOSIS — Z13.5 SCREENING FOR DIABETIC RETINOPATHY: ICD-10-CM

## 2021-12-13 DIAGNOSIS — N18.32 TYPE 2 DIABETES MELLITUS WITH STAGE 3B CHRONIC KIDNEY DISEASE, UNSPECIFIED WHETHER LONG TERM INSULIN USE: ICD-10-CM

## 2021-12-13 DIAGNOSIS — E11.22 TYPE 2 DIABETES MELLITUS WITH STAGE 3B CHRONIC KIDNEY DISEASE, UNSPECIFIED WHETHER LONG TERM INSULIN USE: ICD-10-CM

## 2021-12-13 PROCEDURE — 99495 TRANSJ CARE MGMT MOD F2F 14D: CPT | Mod: S$GLB,,, | Performed by: NURSE PRACTITIONER

## 2021-12-13 PROCEDURE — 4010F PR ACE/ARB THEARPY RXD/TAKEN: ICD-10-PCS | Mod: S$GLB,,, | Performed by: NURSE PRACTITIONER

## 2021-12-13 PROCEDURE — 3060F PR POS MICROALBUMINURIA RESULT DOCUMENTED/REVIEW: ICD-10-PCS | Mod: S$GLB,,, | Performed by: NURSE PRACTITIONER

## 2021-12-13 PROCEDURE — 3066F PR DOCUMENTATION OF TREATMENT FOR NEPHROPATHY: ICD-10-PCS | Mod: S$GLB,,, | Performed by: NURSE PRACTITIONER

## 2021-12-13 PROCEDURE — 99495 TCM SERVICES (MODERATE COMPLEXITY): ICD-10-PCS | Mod: S$GLB,,, | Performed by: NURSE PRACTITIONER

## 2021-12-13 PROCEDURE — 4010F ACE/ARB THERAPY RXD/TAKEN: CPT | Mod: S$GLB,,, | Performed by: NURSE PRACTITIONER

## 2021-12-13 PROCEDURE — 3060F POS MICROALBUMINURIA REV: CPT | Mod: S$GLB,,, | Performed by: NURSE PRACTITIONER

## 2021-12-13 PROCEDURE — 3066F NEPHROPATHY DOC TX: CPT | Mod: S$GLB,,, | Performed by: NURSE PRACTITIONER

## 2021-12-15 ENCOUNTER — TELEPHONE (OUTPATIENT)
Dept: FAMILY MEDICINE | Facility: CLINIC | Age: 71
End: 2021-12-15
Payer: MEDICARE

## 2021-12-15 NOTE — TELEPHONE ENCOUNTER
----- Message from Kelin Curry sent at 12/15/2021  3:19 PM CST -----  Referral to Gastro is pending provider sign chart note.

## 2021-12-20 ENCOUNTER — TELEPHONE (OUTPATIENT)
Dept: FAMILY MEDICINE | Facility: CLINIC | Age: 71
End: 2021-12-20
Payer: MEDICARE

## 2021-12-27 ENCOUNTER — TELEPHONE (OUTPATIENT)
Dept: FAMILY MEDICINE | Facility: CLINIC | Age: 71
End: 2021-12-27
Payer: MEDICARE

## 2022-01-05 DIAGNOSIS — I21.4 NSTEMI (NON-ST ELEVATED MYOCARDIAL INFARCTION): Primary | ICD-10-CM

## 2022-01-06 ENCOUNTER — OFFICE VISIT (OUTPATIENT)
Dept: CARDIOLOGY | Facility: CLINIC | Age: 72
End: 2022-01-06
Payer: MEDICARE

## 2022-01-06 ENCOUNTER — TELEPHONE (OUTPATIENT)
Dept: FAMILY MEDICINE | Facility: CLINIC | Age: 72
End: 2022-01-06
Payer: MEDICARE

## 2022-01-06 VITALS
WEIGHT: 224 LBS | HEIGHT: 69 IN | OXYGEN SATURATION: 97 % | SYSTOLIC BLOOD PRESSURE: 180 MMHG | DIASTOLIC BLOOD PRESSURE: 72 MMHG | HEART RATE: 63 BPM | BODY MASS INDEX: 33.18 KG/M2

## 2022-01-06 DIAGNOSIS — I25.10 CORONARY ARTERY DISEASE INVOLVING NATIVE HEART WITHOUT ANGINA PECTORIS, UNSPECIFIED VESSEL OR LESION TYPE: Primary | ICD-10-CM

## 2022-01-06 PROCEDURE — 3077F SYST BP >= 140 MM HG: CPT | Mod: CPTII,S$GLB,, | Performed by: INTERNAL MEDICINE

## 2022-01-06 PROCEDURE — 1159F PR MEDICATION LIST DOCUMENTED IN MEDICAL RECORD: ICD-10-PCS | Mod: CPTII,S$GLB,, | Performed by: INTERNAL MEDICINE

## 2022-01-06 PROCEDURE — 3078F DIAST BP <80 MM HG: CPT | Mod: CPTII,S$GLB,, | Performed by: INTERNAL MEDICINE

## 2022-01-06 PROCEDURE — 3077F PR MOST RECENT SYSTOLIC BLOOD PRESSURE >= 140 MM HG: ICD-10-PCS | Mod: CPTII,S$GLB,, | Performed by: INTERNAL MEDICINE

## 2022-01-06 PROCEDURE — 99214 OFFICE O/P EST MOD 30 MIN: CPT | Mod: S$GLB,,, | Performed by: INTERNAL MEDICINE

## 2022-01-06 PROCEDURE — 3078F PR MOST RECENT DIASTOLIC BLOOD PRESSURE < 80 MM HG: ICD-10-PCS | Mod: CPTII,S$GLB,, | Performed by: INTERNAL MEDICINE

## 2022-01-06 PROCEDURE — 1126F AMNT PAIN NOTED NONE PRSNT: CPT | Mod: CPTII,S$GLB,, | Performed by: INTERNAL MEDICINE

## 2022-01-06 PROCEDURE — 99999 PR PBB SHADOW E&M-EST. PATIENT-LVL IV: ICD-10-PCS | Mod: PBBFAC,,, | Performed by: INTERNAL MEDICINE

## 2022-01-06 PROCEDURE — 3008F PR BODY MASS INDEX (BMI) DOCUMENTED: ICD-10-PCS | Mod: CPTII,S$GLB,, | Performed by: INTERNAL MEDICINE

## 2022-01-06 PROCEDURE — 1126F PR PAIN SEVERITY QUANTIFIED, NO PAIN PRESENT: ICD-10-PCS | Mod: CPTII,S$GLB,, | Performed by: INTERNAL MEDICINE

## 2022-01-06 PROCEDURE — 3008F BODY MASS INDEX DOCD: CPT | Mod: CPTII,S$GLB,, | Performed by: INTERNAL MEDICINE

## 2022-01-06 PROCEDURE — 99999 PR PBB SHADOW E&M-EST. PATIENT-LVL IV: CPT | Mod: PBBFAC,,, | Performed by: INTERNAL MEDICINE

## 2022-01-06 PROCEDURE — 1159F MED LIST DOCD IN RCRD: CPT | Mod: CPTII,S$GLB,, | Performed by: INTERNAL MEDICINE

## 2022-01-06 PROCEDURE — 99214 PR OFFICE/OUTPT VISIT, EST, LEVL IV, 30-39 MIN: ICD-10-PCS | Mod: S$GLB,,, | Performed by: INTERNAL MEDICINE

## 2022-01-06 RX ORDER — CLOPIDOGREL BISULFATE 75 MG/1
75 TABLET ORAL DAILY
Qty: 90 TABLET | Refills: 1 | Status: ON HOLD | OUTPATIENT
Start: 2022-01-06 | End: 2022-04-21 | Stop reason: HOSPADM

## 2022-01-06 NOTE — PROGRESS NOTES
Interventional Cardiology Clinic Note  Reason for Visit: CAD, PCI to LAD     HPI:   Mr. Childs is a 71-year-old male with history of hypertension, diabetes (A1c 9.2) , past DVT in LLE several years ago (previosuly on Apxiban), hyperlipidemia, coronary artery disease status post PCI 17 years ago who presents for post-hospitalization follow up.     Pt admitted in late November/Early December 2021 with chest pain and shortness of breath to [facility] and transferred to [facility] for high risk PCI.  He reported having intermittent midsternal chest pain- burning sensation with radiation to bilateral jaw.  Pain exacerbated by any activity and resolves with rest. At OSH he was found to have elevated troponin which up trended to 15.   LHC performed which showed significant mid LAD and critical RCA disease. LVEDP 19 in the cath lab.     11/30/21 LHC: mid LAD 75% stenosis noted. PCI with balloon angioplasty and YAMILA x 5 to RCA (distal, mid and proximal) performed on 11/30/21.      Pt presents for follow up for LAD PCI (staged). Since discharge he is doing well. Pt is able to walk a mile with no issue. Pt denies chest pain, jaw pain, SOB, PND, orthopnea, palpitations. Pt reports he is feeling great. Pt reports he is compliant with ASA/Plavix, and other medications. Pt has no other issues at this time.     ROS:    Constitution: Negative for fever, chills, weight loss or gain.   HENT: Negative for sore throat, rhinorrhea, or headache.  Eyes: Negative for blurred or double vision.   Cardiovascular: See above  Pulmonary: Negative for SOB   Gastrointestinal: Negative for abdominal pain, nausea, vomiting, or diarrhea.   : Negative for dysuria.   Neurological: Negative for focal weakness or sensory changes.  PMH:     Past Medical History:   Diagnosis Date    Coronary artery disease     Diabetes mellitus type II     Hypertension      Past Surgical History:   Procedure Laterality Date    ANGIOGRAM, CORONARY, WITH LEFT HEART  CATHETERIZATION N/A 11/27/2021    Procedure: Angiogram, Coronary, with Left Heart Cath;  Surgeon: Dave Vernon MD;  Location: [facility] CATH/EP LAB;  Service: Cardiology;  Laterality: N/A;    APPENDECTOMY      CORONARY ANGIOGRAPHY Left 11/30/2021    Procedure: ANGIOGRAM, CORONARY ARTERY;  Surgeon: Gunnar Vernon MD;  Location: [facility] CATH LAB;  Service: Cardiology;  Laterality: Left;    HERNIA REPAIR      stents       Allergies:     Review of patient's allergies indicates:   Allergen Reactions    Morphine Nausea And Vomiting    Vicodin [hydrocodone-acetaminophen] Nausea And Vomiting     Medications:     Current Outpatient Medications on File Prior to Visit   Medication Sig Dispense Refill    alcohol swabs (BD ALCOHOL SWABS) PadM Apply 1 each topically as needed. 100 each 6    aspirin (ECOTRIN) 81 MG EC tablet Take 1 tablet (81 mg total) by mouth once daily. 30 tablet 11    atorvastatin (LIPITOR) 80 MG tablet Take 1 tablet (80 mg total) by mouth once daily. 90 tablet 3    blood sugar diagnostic (ACCU-CHEK OLGA PLUS TEST STRP) Strp 1 each by Misc.(Non-Drug; Combo Route) route daily as needed. 100 each 6    blood sugar diagnostic Strp To check BG 2 times daily, to use with insurance preferred meter 200 strip 2    carvediloL (COREG) 12.5 MG tablet Take 1 tablet (12.5 mg total) by mouth 2 (two) times daily. 60 tablet 11    clopidogreL (PLAVIX) 75 mg tablet Take 1 tablet (75 mg total) by mouth once daily. 30 tablet 11    glipiZIDE (GLUCOTROL) 10 MG tablet Take 1 tablet (10 mg total) by mouth 2 (two) times daily with meals. 180 tablet 1    lancets Misc To check BG 2 times daily, to use with insurance preferred meter 100 each 0    metFORMIN (GLUCOPHAGE) 1000 MG tablet Take 1 tablet (1,000 mg total) by mouth 2 (two) times daily with meals. Please hold medication for 2 more days. Can restart on 12/3/21 180 tablet 1    MULTIVITAMIN W-MINERALS/LUTEIN (CENTRUM SILVER ORAL) Take 1 capsule by mouth once daily.       SITagliptin (JANUVIA) 100 MG Tab Take 1 tablet (100 mg total) by mouth once daily. 30 tablet 11    valsartan (DIOVAN) 80 MG tablet Take 1 tablet (80 mg total) by mouth 2 (two) times daily. 180 tablet 3     No current facility-administered medications on file prior to visit.     Social History:     Social History     Tobacco Use    Smoking status: Never Smoker    Smokeless tobacco: Never Used   Substance Use Topics    Alcohol use: Yes     Comment: 2 a year     Family History:     Family History   Problem Relation Age of Onset    Diabetes Mother     Cancer Neg Hx     Macular degeneration Neg Hx     Retinal detachment Neg Hx     Glaucoma Neg Hx      Physical Exam:   There were no vitals taken for this visit.   Wt Readings from Last 4 Encounters:   12/13/21 100.7 kg (222 lb)   11/30/21 100.5 kg (221 lb 9 oz)   11/26/21 99.8 kg (220 lb)   11/27/21 99.8 kg (220 lb 0.3 oz)         Constitutional: No distress, obese, conversant  HEENT: Sclera anicteric, PERRLA, EOMI  Neck: No JVD, no masses, good movement  CV: RRR, S1 and S2 normal, no additional heart sounds or murmurs. Pulses 2+ and equal bilaterally in radial arteries, Isai's normal on right. Distal pulses are 2+ and equal in the femoral, DP and PT areas bilaterally  Pulm: Clear to auscultation bilaterally with symmetrical expansion. Chest wall palpated for reproduction of pain symptoms, and no pain was able to be produced on palpation or resistance exercises  GI: Abdomen soft, non-tender, good bowel sounds  Extremities: Both extremities intact and grossly normal, skin is warm, no edema noted  Skin: No ecchymosis, erythema, or ulcers  Psych: AOx3, appropriate affect  Neuro: CNII-XII intact, no focal deficits  EKG: NSR   TTE: 11/27/21 TTE   · The left ventricle is normal in size with severe concentric hypertrophy and mildly decreased systolic function.  · The estimated ejection fraction is 45%. There appears to be inferolateral hypokinesis.  · Indeterminate  left ventricular diastolic function.  · Normal right ventricular size with normal right ventricular systolic function.  Normal central venous pressure (3 mmHg).   Coronary Angiography: Noted above   Stress Test: N/A   Assessment:   Mr. Childs is a 71-year-old male with history of hypertension, diabetes, past DVT in LLE several years ago (On Apxiban), hyperlipidemia, coronary artery disease status post PCI 17 years ago who presents for post-hospitalization follow up.    Plan:     #CAD of native vessels   -Pt noted to have extensive CAD with PCI to RCA and YAMILA placed x5. Pt also noted to have 75% of mid LAD with plans for outpatient staged LAD PCI   -Coronary angiogram reviewed and pt noted to have small LAD; Given pt not having any chest pain or Sx at this time, will defer PCI at this time   -Pt instructed to start cardiac rehab and then will follow up with pt in 4 months and re-evaluate him at that time   -Continue ASA/Plavix, HI Statin, and Coreg 12.5 BID     #HLD     - Continue atorvastatin 80 mg daily     #HTN  -/84 in clinic today .   -Pt reports 140-150s when he checks at home   -continue Coreg 12.5 BID, and Valsartan 80 mg BID  -Will have pt follow up with PCP to further up-titrate BP meds       Keep a home BP diary and bring to next visit  Discussed mediterranean diet  Discussed exercise therapy based on 150-min per week AHA recommendations for moderate intensity exercise/75 min for high-intensity exercise    Signed:  Travis Whitman MD  Cardiology Fellow  Pager - [phone]  [facility]  1/6/2022 10:48 AM    I have personally taken the history and examined this patient and agree with the resident's note as stated above.  Since the patient's RCA PCI he reports that he has become free of anginal symptoms.  He is not experiencing CHF symptoms.  I reviewed the patient's angiograms from [facility].  His mid to distal LAD is a small caliber vessel.  I discussed PCI versus continued medical management.   As the patient is symptom free and his mid to distal LAD is a small caliber vessel the patient agreed on continued medical management.  -continue dual antiplatelet therapy uninterrupted for at least 1 year.  The patient can then discontinue of Plavix if he remains free of anginal symptoms  -continue Coreg 12.5 mg p.o. b.i.d.  -continue valsartan 80 mg p.o. q.day  -continue Lipitor 80 mg p.o. q.day  -recommend tight glycemic control of type 2 diabetes  -recommend starting phase 2 cardiac rehab  -follow-up with Dr. Vernon [facility]  All the patient's questions were answered

## 2022-01-13 ENCOUNTER — CLINICAL SUPPORT (OUTPATIENT)
Dept: CARDIAC REHAB | Facility: HOSPITAL | Age: 72
End: 2022-01-13
Payer: MEDICARE

## 2022-01-13 DIAGNOSIS — I21.4 NSTEMI (NON-ST ELEVATION MYOCARDIAL INFARCTION): ICD-10-CM

## 2022-01-13 PROCEDURE — 93797 PHYS/QHP OP CAR RHAB WO ECG: CPT

## 2022-01-17 ENCOUNTER — CLINICAL SUPPORT (OUTPATIENT)
Dept: CARDIAC REHAB | Facility: HOSPITAL | Age: 72
End: 2022-01-17
Payer: MEDICARE

## 2022-01-17 DIAGNOSIS — I21.4 NSTEMI (NON-ST ELEVATION MYOCARDIAL INFARCTION): ICD-10-CM

## 2022-01-17 PROCEDURE — 93798 PHYS/QHP OP CAR RHAB W/ECG: CPT

## 2022-01-19 ENCOUNTER — CLINICAL SUPPORT (OUTPATIENT)
Dept: CARDIAC REHAB | Facility: HOSPITAL | Age: 72
End: 2022-01-19
Payer: MEDICARE

## 2022-01-19 DIAGNOSIS — I21.4 NSTEMI (NON-ST ELEVATED MYOCARDIAL INFARCTION): ICD-10-CM

## 2022-01-19 PROCEDURE — 93798 PHYS/QHP OP CAR RHAB W/ECG: CPT

## 2022-01-24 ENCOUNTER — CLINICAL SUPPORT (OUTPATIENT)
Dept: CARDIAC REHAB | Facility: HOSPITAL | Age: 72
End: 2022-01-24
Payer: MEDICARE

## 2022-01-24 DIAGNOSIS — I21.4 NSTEMI (NON-ST ELEVATION MYOCARDIAL INFARCTION): ICD-10-CM

## 2022-01-24 PROCEDURE — 93798 PHYS/QHP OP CAR RHAB W/ECG: CPT

## 2022-01-26 ENCOUNTER — CLINICAL SUPPORT (OUTPATIENT)
Dept: CARDIAC REHAB | Facility: HOSPITAL | Age: 72
End: 2022-01-26
Payer: MEDICARE

## 2022-01-26 DIAGNOSIS — I21.4 NSTEMI (NON-ST ELEVATED MYOCARDIAL INFARCTION): ICD-10-CM

## 2022-01-26 PROCEDURE — 93798 PHYS/QHP OP CAR RHAB W/ECG: CPT

## 2022-01-31 ENCOUNTER — CLINICAL SUPPORT (OUTPATIENT)
Dept: CARDIAC REHAB | Facility: HOSPITAL | Age: 72
End: 2022-01-31
Payer: MEDICARE

## 2022-01-31 DIAGNOSIS — I21.4 NSTEMI (NON-ST ELEVATED MYOCARDIAL INFARCTION): ICD-10-CM

## 2022-01-31 PROCEDURE — 93798 PHYS/QHP OP CAR RHAB W/ECG: CPT

## 2022-02-02 ENCOUNTER — CLINICAL SUPPORT (OUTPATIENT)
Dept: CARDIAC REHAB | Facility: HOSPITAL | Age: 72
End: 2022-02-02
Payer: MEDICARE

## 2022-02-02 DIAGNOSIS — Z95.5 S/P CORONARY ARTERY STENT PLACEMENT: ICD-10-CM

## 2022-02-02 PROCEDURE — 93798 PHYS/QHP OP CAR RHAB W/ECG: CPT

## 2022-02-04 ENCOUNTER — CLINICAL SUPPORT (OUTPATIENT)
Dept: CARDIAC REHAB | Facility: HOSPITAL | Age: 72
End: 2022-02-04
Payer: MEDICARE

## 2022-02-04 DIAGNOSIS — Z95.5 S/P CORONARY ARTERY STENT PLACEMENT: ICD-10-CM

## 2022-02-04 PROCEDURE — 93798 PHYS/QHP OP CAR RHAB W/ECG: CPT

## 2022-02-07 ENCOUNTER — CLINICAL SUPPORT (OUTPATIENT)
Dept: CARDIAC REHAB | Facility: HOSPITAL | Age: 72
End: 2022-02-07
Payer: MEDICARE

## 2022-02-07 DIAGNOSIS — Z95.5 S/P CORONARY ARTERY STENT PLACEMENT: ICD-10-CM

## 2022-02-07 PROCEDURE — 93798 PHYS/QHP OP CAR RHAB W/ECG: CPT

## 2022-02-09 ENCOUNTER — CLINICAL SUPPORT (OUTPATIENT)
Dept: CARDIAC REHAB | Facility: HOSPITAL | Age: 72
End: 2022-02-09
Payer: MEDICARE

## 2022-02-09 DIAGNOSIS — Z95.5 S/P CORONARY ARTERY STENT PLACEMENT: ICD-10-CM

## 2022-02-09 PROCEDURE — 93798 PHYS/QHP OP CAR RHAB W/ECG: CPT

## 2022-02-11 ENCOUNTER — CLINICAL SUPPORT (OUTPATIENT)
Dept: CARDIAC REHAB | Facility: HOSPITAL | Age: 72
End: 2022-02-11
Payer: MEDICARE

## 2022-02-11 DIAGNOSIS — Z95.5 S/P CORONARY ARTERY STENT PLACEMENT: ICD-10-CM

## 2022-02-11 PROCEDURE — 93798 PHYS/QHP OP CAR RHAB W/ECG: CPT

## 2022-02-14 ENCOUNTER — CLINICAL SUPPORT (OUTPATIENT)
Dept: CARDIAC REHAB | Facility: HOSPITAL | Age: 72
End: 2022-02-14
Payer: MEDICARE

## 2022-02-14 DIAGNOSIS — Z95.5 S/P CORONARY ARTERY STENT PLACEMENT: ICD-10-CM

## 2022-02-14 PROCEDURE — 93798 PHYS/QHP OP CAR RHAB W/ECG: CPT

## 2022-02-22 DIAGNOSIS — I10 HTN (HYPERTENSION), BENIGN: Primary | ICD-10-CM

## 2022-02-22 RX ORDER — METFORMIN HYDROCHLORIDE 1000 MG/1
TABLET ORAL
Qty: 90 TABLET | Refills: 0 | OUTPATIENT
Start: 2022-02-22

## 2022-02-22 RX ORDER — CARVEDILOL 12.5 MG/1
12.5 TABLET ORAL 2 TIMES DAILY WITH MEALS
COMMUNITY
End: 2022-02-22 | Stop reason: SDUPTHER

## 2022-02-22 RX ORDER — CARVEDILOL 12.5 MG/1
12.5 TABLET ORAL 2 TIMES DAILY WITH MEALS
Qty: 180 TABLET | Refills: 3 | Status: SHIPPED | OUTPATIENT
Start: 2022-02-22

## 2022-03-25 ENCOUNTER — TELEPHONE (OUTPATIENT)
Dept: FAMILY MEDICINE | Facility: CLINIC | Age: 72
End: 2022-03-25
Payer: MEDICARE

## 2022-04-01 ENCOUNTER — OFFICE VISIT (OUTPATIENT)
Dept: FAMILY MEDICINE | Facility: CLINIC | Age: 72
End: 2022-04-01
Payer: MEDICARE

## 2022-04-01 VITALS
BODY MASS INDEX: 32 KG/M2 | HEIGHT: 69 IN | DIASTOLIC BLOOD PRESSURE: 80 MMHG | SYSTOLIC BLOOD PRESSURE: 150 MMHG | OXYGEN SATURATION: 99 % | HEART RATE: 63 BPM | RESPIRATION RATE: 16 BRPM | WEIGHT: 216.06 LBS | TEMPERATURE: 98 F

## 2022-04-01 DIAGNOSIS — E11.69 HYPERLIPIDEMIA ASSOCIATED WITH TYPE 2 DIABETES MELLITUS: ICD-10-CM

## 2022-04-01 DIAGNOSIS — Z12.11 COLON CANCER SCREENING: ICD-10-CM

## 2022-04-01 DIAGNOSIS — Z12.5 ENCOUNTER FOR PROSTATE CANCER SCREENING: ICD-10-CM

## 2022-04-01 DIAGNOSIS — E11.59 HYPERTENSION ASSOCIATED WITH DIABETES: ICD-10-CM

## 2022-04-01 DIAGNOSIS — Z00.00 ENCOUNTER FOR PREVENTIVE HEALTH EXAMINATION: Primary | ICD-10-CM

## 2022-04-01 DIAGNOSIS — R79.9 ABNORMAL FINDING OF BLOOD CHEMISTRY, UNSPECIFIED: ICD-10-CM

## 2022-04-01 DIAGNOSIS — I15.2 HYPERTENSION ASSOCIATED WITH DIABETES: ICD-10-CM

## 2022-04-01 DIAGNOSIS — I25.10 CORONARY ARTERY DISEASE, UNSPECIFIED VESSEL OR LESION TYPE, UNSPECIFIED WHETHER ANGINA PRESENT, UNSPECIFIED WHETHER NATIVE OR TRANSPLANTED HEART: ICD-10-CM

## 2022-04-01 DIAGNOSIS — I50.43 ACUTE ON CHRONIC COMBINED SYSTOLIC (CONGESTIVE) AND DIASTOLIC (CONGESTIVE) HEART FAILURE: ICD-10-CM

## 2022-04-01 DIAGNOSIS — E78.5 HYPERLIPIDEMIA ASSOCIATED WITH TYPE 2 DIABETES MELLITUS: ICD-10-CM

## 2022-04-01 DIAGNOSIS — E11.65 TYPE 2 DIABETES MELLITUS WITH HYPERGLYCEMIA, WITHOUT LONG-TERM CURRENT USE OF INSULIN: ICD-10-CM

## 2022-04-01 DIAGNOSIS — N18.32 TYPE 2 DIABETES MELLITUS WITH STAGE 3B CHRONIC KIDNEY DISEASE, WITHOUT LONG-TERM CURRENT USE OF INSULIN: ICD-10-CM

## 2022-04-01 DIAGNOSIS — E11.22 TYPE 2 DIABETES MELLITUS WITH STAGE 3B CHRONIC KIDNEY DISEASE, WITHOUT LONG-TERM CURRENT USE OF INSULIN: ICD-10-CM

## 2022-04-01 LAB
ALBUMIN/CREAT UR: 533.3 UG/MG (ref 0–30)
CREAT UR-MCNC: 36 MG/DL (ref 23–375)
MICROALBUMIN UR DL<=1MG/L-MCNC: 192 UG/ML

## 2022-04-01 PROCEDURE — 99999 PR PBB SHADOW E&M-EST. PATIENT-LVL IV: CPT | Mod: PBBFAC,,, | Performed by: STUDENT IN AN ORGANIZED HEALTH CARE EDUCATION/TRAINING PROGRAM

## 2022-04-01 PROCEDURE — 99999 PR PBB SHADOW E&M-EST. PATIENT-LVL IV: ICD-10-PCS | Mod: PBBFAC,,, | Performed by: STUDENT IN AN ORGANIZED HEALTH CARE EDUCATION/TRAINING PROGRAM

## 2022-04-01 PROCEDURE — 4010F PR ACE/ARB THEARPY RXD/TAKEN: ICD-10-PCS | Mod: CPTII,S$GLB,, | Performed by: STUDENT IN AN ORGANIZED HEALTH CARE EDUCATION/TRAINING PROGRAM

## 2022-04-01 PROCEDURE — 3008F PR BODY MASS INDEX (BMI) DOCUMENTED: ICD-10-PCS | Mod: CPTII,S$GLB,, | Performed by: STUDENT IN AN ORGANIZED HEALTH CARE EDUCATION/TRAINING PROGRAM

## 2022-04-01 PROCEDURE — 99397 PER PM REEVAL EST PAT 65+ YR: CPT | Mod: S$GLB,,, | Performed by: STUDENT IN AN ORGANIZED HEALTH CARE EDUCATION/TRAINING PROGRAM

## 2022-04-01 PROCEDURE — 82570 ASSAY OF URINE CREATININE: CPT | Performed by: STUDENT IN AN ORGANIZED HEALTH CARE EDUCATION/TRAINING PROGRAM

## 2022-04-01 PROCEDURE — 1159F PR MEDICATION LIST DOCUMENTED IN MEDICAL RECORD: ICD-10-PCS | Mod: CPTII,S$GLB,, | Performed by: STUDENT IN AN ORGANIZED HEALTH CARE EDUCATION/TRAINING PROGRAM

## 2022-04-01 PROCEDURE — 1101F PT FALLS ASSESS-DOCD LE1/YR: CPT | Mod: CPTII,S$GLB,, | Performed by: STUDENT IN AN ORGANIZED HEALTH CARE EDUCATION/TRAINING PROGRAM

## 2022-04-01 PROCEDURE — 82043 UR ALBUMIN QUANTITATIVE: CPT | Performed by: STUDENT IN AN ORGANIZED HEALTH CARE EDUCATION/TRAINING PROGRAM

## 2022-04-01 PROCEDURE — 99397 PR PREVENTIVE VISIT,EST,65 & OVER: ICD-10-PCS | Mod: S$GLB,,, | Performed by: STUDENT IN AN ORGANIZED HEALTH CARE EDUCATION/TRAINING PROGRAM

## 2022-04-01 PROCEDURE — 1101F PR PT FALLS ASSESS DOC 0-1 FALLS W/OUT INJ PAST YR: ICD-10-PCS | Mod: CPTII,S$GLB,, | Performed by: STUDENT IN AN ORGANIZED HEALTH CARE EDUCATION/TRAINING PROGRAM

## 2022-04-01 PROCEDURE — 3079F PR MOST RECENT DIASTOLIC BLOOD PRESSURE 80-89 MM HG: ICD-10-PCS | Mod: CPTII,S$GLB,, | Performed by: STUDENT IN AN ORGANIZED HEALTH CARE EDUCATION/TRAINING PROGRAM

## 2022-04-01 PROCEDURE — 3288F FALL RISK ASSESSMENT DOCD: CPT | Mod: CPTII,S$GLB,, | Performed by: STUDENT IN AN ORGANIZED HEALTH CARE EDUCATION/TRAINING PROGRAM

## 2022-04-01 PROCEDURE — 3077F SYST BP >= 140 MM HG: CPT | Mod: CPTII,S$GLB,, | Performed by: STUDENT IN AN ORGANIZED HEALTH CARE EDUCATION/TRAINING PROGRAM

## 2022-04-01 PROCEDURE — 4010F ACE/ARB THERAPY RXD/TAKEN: CPT | Mod: CPTII,S$GLB,, | Performed by: STUDENT IN AN ORGANIZED HEALTH CARE EDUCATION/TRAINING PROGRAM

## 2022-04-01 PROCEDURE — 3008F BODY MASS INDEX DOCD: CPT | Mod: CPTII,S$GLB,, | Performed by: STUDENT IN AN ORGANIZED HEALTH CARE EDUCATION/TRAINING PROGRAM

## 2022-04-01 PROCEDURE — 3077F PR MOST RECENT SYSTOLIC BLOOD PRESSURE >= 140 MM HG: ICD-10-PCS | Mod: CPTII,S$GLB,, | Performed by: STUDENT IN AN ORGANIZED HEALTH CARE EDUCATION/TRAINING PROGRAM

## 2022-04-01 PROCEDURE — 1126F AMNT PAIN NOTED NONE PRSNT: CPT | Mod: CPTII,S$GLB,, | Performed by: STUDENT IN AN ORGANIZED HEALTH CARE EDUCATION/TRAINING PROGRAM

## 2022-04-01 PROCEDURE — 3288F PR FALLS RISK ASSESSMENT DOCUMENTED: ICD-10-PCS | Mod: CPTII,S$GLB,, | Performed by: STUDENT IN AN ORGANIZED HEALTH CARE EDUCATION/TRAINING PROGRAM

## 2022-04-01 PROCEDURE — 3079F DIAST BP 80-89 MM HG: CPT | Mod: CPTII,S$GLB,, | Performed by: STUDENT IN AN ORGANIZED HEALTH CARE EDUCATION/TRAINING PROGRAM

## 2022-04-01 PROCEDURE — 1159F MED LIST DOCD IN RCRD: CPT | Mod: CPTII,S$GLB,, | Performed by: STUDENT IN AN ORGANIZED HEALTH CARE EDUCATION/TRAINING PROGRAM

## 2022-04-01 PROCEDURE — 1126F PR PAIN SEVERITY QUANTIFIED, NO PAIN PRESENT: ICD-10-PCS | Mod: CPTII,S$GLB,, | Performed by: STUDENT IN AN ORGANIZED HEALTH CARE EDUCATION/TRAINING PROGRAM

## 2022-04-01 RX ORDER — GLIPIZIDE 10 MG/1
10 TABLET ORAL 2 TIMES DAILY WITH MEALS
Qty: 180 TABLET | Refills: 1 | Status: SHIPPED | OUTPATIENT
Start: 2022-04-01 | End: 2022-06-10 | Stop reason: SDUPTHER

## 2022-04-01 NOTE — PROGRESS NOTES
"[facility] Note    Subjective:    The HPI and pertinent ROS is included in the Diagnostic Impression Remarks section at the end of the note. Please see below for further details. Chief complaint is at end of note.     Data reviewed 274}  Previous medical records reviewed and summarized in plan section at end of note.      The following portions of the patient's history were reviewed and updated as appropriate: allergies, current medications, past family history, past medical history, past social history, past surgical history and problem list.    He  has a past medical history of Coronary artery disease, Diabetes mellitus type II, and Hypertension.  He  has a past surgical history that includes stents; Appendectomy; Hernia repair; ANGIOGRAM, CORONARY, WITH LEFT HEART CATHETERIZATION (N/A, 11/27/2021); and Coronary angiography (Left, 11/30/2021).    He  reports that he has never smoked. He has never used smokeless tobacco. He reports previous alcohol use. He reports that he does not use drugs.  He family history includes Diabetes in his mother.    Review of patient's allergies indicates:   Allergen Reactions    Morphine Nausea And Vomiting    Vicodin [hydrocodone-acetaminophen] Nausea And Vomiting       Tobacco Use: Low Risk     Smoking Tobacco Use: Never Smoker    Smokeless Tobacco Use: Never Used     Physical Examination  Wt Readings from Last 3 Encounters:   04/01/22 98 kg (216 lb 0.8 oz)   01/06/22 101.6 kg (223 lb 15.8 oz)   12/13/21 100.7 kg (222 lb)                Estimated body mass index is 31.91 kg/m² as calculated from the following:    Height as of this encounter: 5' 9" (1.753 m).    Weight as of this encounter: 98 kg (216 lb 0.8 oz).     General appearance: alert, cooperative, no distress  Neck: no thyromegaly, no neck stiffness  Lungs: clear to auscultation, no wheezes, rales or rhonchi, symmetric air entry  Heart: normal rate, regular rhythm, normal S1, S2, no murmurs, rubs, clicks " "or gallops  Abdomen: soft, nontender, nondistended, no rigidity, rebound, or guarding.   Back: no point tenderness over spine  Extremities: peripheral pulses normal, no unilateral leg swelling or calf tenderness   Neurological:alert, oriented, normal speech, no new focal findings or movement disorder noted from baseline    BP Readings from Last 3 Encounters:   04/01/22 (!) 150/80   01/06/22 (!) 180/72   12/13/21 (!) 162/100     BP (!) 150/80 (BP Location: Left arm, Patient Position: Sitting, BP Method: Large (Manual))   Pulse 63   Temp 97.7 °F (36.5 °C) (Oral)   Resp 16   Ht 5' 9" (1.753 m)   Wt 98 kg (216 lb 0.8 oz)   SpO2 99%   BMI 31.91 kg/m²    274}  Laboratory: I have reviewed old labs below:    274}    Lab Results   Component Value Date    WBC 7.05 12/01/2021    HGB 9.2 (L) 12/01/2021    HCT 27.1 (L) 12/01/2021    MCV 92 12/01/2021     12/01/2021     12/01/2021    K 4.2 12/01/2021     (H) 12/01/2021    CALCIUM 8.2 (L) 12/01/2021    PHOS 3.6 11/28/2021    CO2 19 (L) 12/01/2021     (H) 12/01/2021    BUN 23 12/01/2021    CREATININE 1.6 (H) 12/01/2021    ANIONGAP 9 12/01/2021    ESTGFRAFRICA 48.7 (A) 12/01/2021    EGFRNONAA 42.1 (A) 12/01/2021    PROT 5.5 (L) 11/28/2021    ALBUMIN 3.3 (L) 11/28/2021    BILITOT 0.6 11/28/2021    ALKPHOS 64 11/28/2021    ALT 21 11/28/2021    AST 41 (H) 11/28/2021    INR 1.1 11/27/2021    CHOL 169 11/27/2021    TRIG 119 11/27/2021    HDL 34 (L) 11/27/2021    LDLCALC 111.2 11/27/2021    TSH 2.280 11/26/2021    PSA 1.2 12/03/2013    HGBA1C 9.2 (H) 11/27/2021    MICROALBUR 3.1 01/26/2021     Lab reviewed by me: Particular labs of significance that I will monitor, workup, or treat to improve are mentioned below in diagnostic impression remarks.    Imaging/EKG: I have reviewed the pertinent results and my findings are noted in remarks.  274}    Chief Complaint:   Chief Complaint   Patient presents with    Diabetes    Establish Care    Hyperlipidemia     " "   274}    Assessment/Plan  Amadeo Levin is a 71 y.o. male who presents to clinic with:  1. Encounter for preventive health examination    2. Type 2 diabetes mellitus with hyperglycemia, without long-term current use of insulin    3. Hypertension associated with diabetes    4. Hyperlipidemia associated with type 2 diabetes mellitus    5. Coronary artery disease, unspecified vessel or lesion type, unspecified whether angina present, unspecified whether native or transplanted heart    6. Type 2 diabetes mellitus with stage 3b chronic kidney disease, without long-term current use of insulin    7. Abnormal finding of blood chemistry, unspecified    8. Uncontrolled type 2 diabetes mellitus with stage 3 chronic kidney disease, without long-term current use of insulin    9. Encounter for prostate cancer screening    10. Abnormal finding of blood chemistry, unspecified     11. Colon cancer screening    12. 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 Although I have made an effort to ensure accuracy, "sound like" errors may exist and should be interpreted in context.      Preventive-patient has fit test will send in will also get PSA after shared decision-making   Diabetes-control uncertain reports his glucose has been improved before meals is in 80s to 100 it is recheck A1c recommended get eye exam   Hypertension-control uncertain he reports it is well controlled at home and 130 systolic and is elevated way comes in will hold blood pressure consider additional medications but will hold for now no headache or chest pain   Hyperlipidemia-stable continue statin   Coronary artery disease-stable continue Plavix statin   CKD-stable continue valsartan stay hydrated monitor      This is the extent of the patient's " complaints at this present time. He denies chest pain upon exertion, dyspnea, nausea, vomiting, diaphoresis, and syncope. No pleuritic chest pain, unilateral leg swelling, calf tenderness, or calf pain.     Amadeo will return to clinic in a few months for further workup and reassessment or sooner as needed. He was instructed to call the clinic or go to the emergency department if his symptoms do not improve, worsens, or if new symptoms develop. As we discussed that symptoms could worsen over the next 24 hours he was advised that if any increased swelling, pain, or numbness arise to go immediately to the ED. Patient knows to call any time if an emergency arises. Shared decision making occurred and he verbalized understanding in agreement with this plan.     I discussed imaging findings, diagnosis, possibilities, treatment options, medications, risks, and benefits. He had many questions regarding the options and long-term effects. All questions were answered. He expressed understanding after counseling regarding the diagnosis and recommendations. He was capable and demonstrated competence with understanding of these options. Shared decision making was performed resulting in him choosing the current treatment plan. Patient handout was given with instructions and recommendations. Advised the patient that if they become pregnant to alert us immediately to assess for medication changes.     I also discussed the importance of close follow up to discuss labs, change or modify his medications if needed, monitor side effects, and further evaluation of medical problems.     Additional workup planned: see labs ordered below.    See below for labs and meds ordered with associated diagnosis      1. Encounter for preventive health examination  - CBC Auto Differential; Future  - Comprehensive Metabolic Panel; Future  - CBC Auto Differential  - Comprehensive Metabolic Panel    2. Type 2 diabetes mellitus with hyperglycemia, without  long-term current use of insulin  - Hemoglobin A1C; Future  - Hemoglobin A1C  - Diabetic Eye Screening Photo; Future  - MICROALBUMIN / CREATININE RATIO URINE  - Diabetes Digital Medicine (DDMP) Enrollment Order  - Diabetes Digital Medicine (DDMP): Assign Onboarding Questionnaires    3. Hypertension associated with diabetes  - Hypertension Digital Medicine (HDMP) Enrollment Order  - Hypertension Digital Medicine (HDMP): Assign Onboarding Questionnaires    4. Hyperlipidemia associated with type 2 diabetes mellitus  - Lipid Panel; Future  - Lipid Panel    5. Coronary artery disease, unspecified vessel or lesion type, unspecified whether angina present, unspecified whether native or transplanted heart    6. Type 2 diabetes mellitus with stage 3b chronic kidney disease, without long-term current use of insulin    7. Abnormal finding of blood chemistry, unspecified  - CBC Auto Differential; Future  - CBC Auto Differential    8. Uncontrolled type 2 diabetes mellitus with stage 3 chronic kidney disease, without long-term current use of insulin  - glipiZIDE (GLUCOTROL) 10 MG tablet; Take 1 tablet (10 mg total) by mouth 2 (two) times daily with meals.  Dispense: 180 tablet; Refill: 1    9. Encounter for prostate cancer screening  - PSA, Screening; Future  - PSA, Screening    10. Abnormal finding of blood chemistry, unspecified   - CBC Auto Differential; Future  - CBC Auto Differential    11. Colon cancer screening    12. Acute on chronic combined systolic (congestive) and diastolic (congestive) heart failure    Medication List with Changes/Refills   Current Medications    ALCOHOL SWABS (BD ALCOHOL SWABS) PADM    Apply 1 each topically as needed.    ASPIRIN (ECOTRIN) 81 MG EC TABLET    Take 1 tablet (81 mg total) by mouth once daily.    ATORVASTATIN (LIPITOR) 80 MG TABLET    Take 1 tablet (80 mg total) by mouth every evening.    BLOOD SUGAR DIAGNOSTIC (ACCU-CHEK OLGA PLUS TEST STRP) STRP    1 each by Misc.(Non-Drug; Combo  Route) route daily as needed.    BLOOD SUGAR DIAGNOSTIC STRP    To check BG 2 times daily, to use with insurance preferred meter    CARVEDILOL (COREG) 12.5 MG TABLET    Take 1 tablet (12.5 mg total) by mouth 2 (two) times daily with meals.    CLOPIDOGREL (PLAVIX) 75 MG TABLET    Take 1 tablet (75 mg total) by mouth once daily.    LANCETS MISC    To check BG 2 times daily, to use with insurance preferred meter    METFORMIN (GLUCOPHAGE) 1000 MG TABLET    Take 1 tablet (1,000 mg total) by mouth 2 (two) times daily with meals. Please hold medication for 2 more days. Can restart on 12/3/21    MULTIVITAMIN W-MINERALS/LUTEIN (CENTRUM SILVER ORAL)    Take 1 capsule by mouth once daily.    SITAGLIPTIN (JANUVIA) 100 MG TAB    Take 1 tablet (100 mg total) by mouth once daily.    VALSARTAN (DIOVAN) 80 MG TABLET    Take 1 tablet (80 mg total) by mouth once daily.   Changed and/or Refilled Medications    Modified Medication Previous Medication    GLIPIZIDE (GLUCOTROL) 10 MG TABLET glipiZIDE (GLUCOTROL) 10 MG tablet       Take 1 tablet (10 mg total) by mouth 2 (two) times daily with meals.    Take 1 tablet (10 mg total) by mouth 2 (two) times daily with meals.     Modified Medications    Modified Medication Previous Medication    GLIPIZIDE (GLUCOTROL) 10 MG TABLET glipiZIDE (GLUCOTROL) 10 MG tablet       Take 1 tablet (10 mg total) by mouth 2 (two) times daily with meals.    Take 1 tablet (10 mg total) by mouth 2 (two) times daily with meals.       Angelito Adams MD   274}  04/01/2022     This note was completed with dictation software and grammatical errors may exist.    If you are due for any health screening(s) below please notify me so we can arrange them to be ordered and scheduled to maintain your health.     Health Maintenance Due   Topic Date Due    COVID-19 Vaccine (1) Never done    TETANUS VACCINE  Never done    Colorectal Cancer Screening  Never done    Shingles Vaccine (1 of 2) Never done    Eye Exam  10/12/2019     Influenza Vaccine (1) 09/01/2021    Diabetes Urine Screening  01/26/2022    Hemoglobin A1c  02/27/2022

## 2022-04-02 LAB
ALBUMIN SERPL-MCNC: 4.4 G/DL (ref 3.7–4.7)
ALBUMIN/GLOB SERPL: 1.6 {RATIO} (ref 1.2–2.2)
ALP SERPL-CCNC: 96 IU/L (ref 44–121)
ALT SERPL-CCNC: 18 IU/L (ref 0–44)
AST SERPL-CCNC: 20 IU/L (ref 0–40)
BASOPHILS # BLD AUTO: 0.1 X10E3/UL (ref 0–0.2)
BASOPHILS NFR BLD AUTO: 1 %
BILIRUB SERPL-MCNC: 0.5 MG/DL (ref 0–1.2)
BUN SERPL-MCNC: 25 MG/DL (ref 8–27)
BUN/CREAT SERPL: 16 (ref 10–24)
CALCIUM SERPL-MCNC: 9.5 MG/DL (ref 8.6–10.2)
CHLORIDE SERPL-SCNC: 108 MMOL/L (ref 96–106)
CHOLEST SERPL-MCNC: 118 MG/DL (ref 100–199)
CO2 SERPL-SCNC: 21 MMOL/L (ref 20–29)
CREAT SERPL-MCNC: 1.56 MG/DL (ref 0.76–1.27)
EOSINOPHIL # BLD AUTO: 0.2 X10E3/UL (ref 0–0.4)
EOSINOPHIL NFR BLD AUTO: 3 %
ERYTHROCYTE [DISTWIDTH] IN BLOOD BY AUTOMATED COUNT: 13.1 % (ref 11.6–15.4)
EST. GFR  (NO RACE VARIABLE): 47 ML/MIN/1.73
GLOBULIN SER CALC-MCNC: 2.8 G/DL (ref 1.5–4.5)
GLUCOSE SERPL-MCNC: 98 MG/DL (ref 65–99)
HBA1C MFR BLD: 7.4 % (ref 4.8–5.6)
HCT VFR BLD AUTO: 37.9 % (ref 37.5–51)
HDLC SERPL-MCNC: 36 MG/DL
HGB BLD-MCNC: 12 G/DL (ref 13–17.7)
IMM GRANULOCYTES # BLD AUTO: 0 X10E3/UL (ref 0–0.1)
IMM GRANULOCYTES NFR BLD AUTO: 0 %
LDLC SERPL CALC-MCNC: 67 MG/DL (ref 0–99)
LYMPHOCYTES # BLD AUTO: 1.3 X10E3/UL (ref 0.7–3.1)
LYMPHOCYTES NFR BLD AUTO: 17 %
MCH RBC QN AUTO: 28.9 PG (ref 26.6–33)
MCHC RBC AUTO-ENTMCNC: 31.7 G/DL (ref 31.5–35.7)
MCV RBC AUTO: 91 FL (ref 79–97)
MONOCYTES # BLD AUTO: 0.5 X10E3/UL (ref 0.1–0.9)
MONOCYTES NFR BLD AUTO: 6 %
NEUTROPHILS # BLD AUTO: 5.4 X10E3/UL (ref 1.4–7)
NEUTROPHILS NFR BLD AUTO: 73 %
PLATELET # BLD AUTO: 158 X10E3/UL (ref 150–450)
POTASSIUM SERPL-SCNC: 5.4 MMOL/L (ref 3.5–5.2)
PROT SERPL-MCNC: 7.2 G/DL (ref 6–8.5)
PSA SERPL-MCNC: 1.1 NG/ML (ref 0–4)
RBC # BLD AUTO: 4.15 X10E6/UL (ref 4.14–5.8)
SODIUM SERPL-SCNC: 144 MMOL/L (ref 134–144)
TRIGL SERPL-MCNC: 74 MG/DL (ref 0–149)
VLDLC SERPL CALC-MCNC: 15 MG/DL (ref 5–40)
WBC # BLD AUTO: 7.4 X10E3/UL (ref 3.4–10.8)

## 2022-04-04 ENCOUNTER — PATIENT MESSAGE (OUTPATIENT)
Dept: FAMILY MEDICINE | Facility: CLINIC | Age: 72
End: 2022-04-04
Payer: MEDICARE

## 2022-04-04 ENCOUNTER — TELEPHONE (OUTPATIENT)
Dept: FAMILY MEDICINE | Facility: CLINIC | Age: 72
End: 2022-04-04
Payer: MEDICARE

## 2022-04-04 DIAGNOSIS — E11.22 CKD STAGE 3 DUE TO TYPE 2 DIABETES MELLITUS: ICD-10-CM

## 2022-04-04 DIAGNOSIS — E87.5 HYPERKALEMIA: Primary | ICD-10-CM

## 2022-04-04 DIAGNOSIS — N18.30 CKD STAGE 3 DUE TO TYPE 2 DIABETES MELLITUS: ICD-10-CM

## 2022-04-04 DIAGNOSIS — E11.65 TYPE 2 DIABETES MELLITUS WITH HYPERGLYCEMIA, WITHOUT LONG-TERM CURRENT USE OF INSULIN: ICD-10-CM

## 2022-04-04 LAB — HEMOCCULT STL QL IA: NEGATIVE

## 2022-04-04 RX ORDER — METFORMIN HYDROCHLORIDE 1000 MG/1
1000 TABLET ORAL 2 TIMES DAILY WITH MEALS
Qty: 180 TABLET | Refills: 1 | Status: ON HOLD | OUTPATIENT
Start: 2022-04-04 | End: 2022-06-17 | Stop reason: SDUPTHER

## 2022-04-04 NOTE — PROGRESS NOTES
Please let the patient know that his A1c was lower which is good news and he should continue his current medications.  He did have a high potassium level this is not healthy thus I recommend for him to try and avoid foods with potassium minute and we will need to recheck his potassium levels within a week please schedule labs.  For his kidney disease and high potassium it would be wise to establish with a nephrologist I placed a referral please set this up as well.  I also placed an endocrinology referral.  Thanks

## 2022-04-08 ENCOUNTER — TELEPHONE (OUTPATIENT)
Dept: FAMILY MEDICINE | Facility: CLINIC | Age: 72
End: 2022-04-08

## 2022-04-08 ENCOUNTER — CLINICAL SUPPORT (OUTPATIENT)
Dept: FAMILY MEDICINE | Facility: CLINIC | Age: 72
End: 2022-04-08
Payer: MEDICARE

## 2022-04-08 ENCOUNTER — CLINICAL SUPPORT (OUTPATIENT)
Dept: FAMILY MEDICINE | Facility: CLINIC | Age: 72
End: 2022-04-08
Attending: STUDENT IN AN ORGANIZED HEALTH CARE EDUCATION/TRAINING PROGRAM
Payer: MEDICARE

## 2022-04-08 VITALS — HEART RATE: 64 BPM | SYSTOLIC BLOOD PRESSURE: 130 MMHG | DIASTOLIC BLOOD PRESSURE: 78 MMHG

## 2022-04-08 DIAGNOSIS — E11.59 HYPERTENSION ASSOCIATED WITH DIABETES: Primary | ICD-10-CM

## 2022-04-08 DIAGNOSIS — E11.65 TYPE 2 DIABETES MELLITUS WITH HYPERGLYCEMIA, WITHOUT LONG-TERM CURRENT USE OF INSULIN: ICD-10-CM

## 2022-04-08 DIAGNOSIS — Z01.30 BP CHECK: Primary | ICD-10-CM

## 2022-04-08 DIAGNOSIS — I15.2 HYPERTENSION ASSOCIATED WITH DIABETES: Primary | ICD-10-CM

## 2022-04-08 PROCEDURE — 92228 DIABETIC EYE SCREENING PHOTO: ICD-10-PCS | Mod: 26,S$GLB,, | Performed by: OPHTHALMOLOGY

## 2022-04-08 PROCEDURE — 99999 PR PBB SHADOW E&M-EST. PATIENT-LVL I: CPT | Mod: PBBFAC,,,

## 2022-04-08 PROCEDURE — 92228 IMG RTA DETC/MNTR DS PHY/QHP: CPT | Mod: 26,S$GLB,, | Performed by: OPHTHALMOLOGY

## 2022-04-08 PROCEDURE — 92228 IMG RTA DETC/MNTR DS PHY/QHP: CPT | Mod: TC,S$GLB,, | Performed by: STUDENT IN AN ORGANIZED HEALTH CARE EDUCATION/TRAINING PROGRAM

## 2022-04-08 PROCEDURE — 92228 DIABETIC EYE SCREENING PHOTO: ICD-10-PCS | Mod: TC,S$GLB,, | Performed by: STUDENT IN AN ORGANIZED HEALTH CARE EDUCATION/TRAINING PROGRAM

## 2022-04-08 PROCEDURE — 99999 PR PBB SHADOW E&M-EST. PATIENT-LVL I: ICD-10-PCS | Mod: PBBFAC,,,

## 2022-04-08 RX ORDER — CHLORTHALIDONE 25 MG/1
12.5 TABLET ORAL DAILY
Qty: 45 TABLET | Refills: 0 | Status: SHIPPED | OUTPATIENT
Start: 2022-04-08 | End: 2022-05-09

## 2022-04-08 RX ORDER — VALSARTAN 80 MG/1
80 TABLET ORAL DAILY
Qty: 90 TABLET | Refills: 0 | Status: SHIPPED | OUTPATIENT
Start: 2022-04-08 | End: 2022-04-13

## 2022-04-08 NOTE — PROGRESS NOTES
Amadeo Levin is a 71 y.o. male here for a diabetic eye screening with non-dilated fundus photos per .    Patient cooperative?: yes  Small pupils?: yes  Last eye exam: next month    For exam results, see Encounter Report.

## 2022-04-08 NOTE — TELEPHONE ENCOUNTER
----- Message from Yamile Cortez sent at 4/8/2022  4:22 PM CDT -----  Type: Needs Medical Advice  Who Called:  Pt  Best Call Back Number: [phone]   Additional Information: Pt returning call to discuss message from Dr. Adams--please advise--Thank you

## 2022-04-08 NOTE — PROGRESS NOTES
Amadeo Levin 71 y.o. male is here today for Blood Pressure check.   History of HTN yes.    Review of patient's allergies indicates:   Allergen Reactions    Morphine Nausea And Vomiting    Vicodin [hydrocodone-acetaminophen] Nausea And Vomiting     Creatinine   Date Value Ref Range Status   04/01/2022 1.56 (H) 0.76 - 1.27 mg/dL Final     Sodium   Date Value Ref Range Status   04/01/2022 144 134 - 144 mmol/L Final     Potassium   Date Value Ref Range Status   04/01/2022 5.4 (H) 3.5 - 5.2 mmol/L Final   ]  Patient verifies taking blood pressure medications on a regular basis at the same time of the day.     Current Outpatient Medications:     alcohol swabs (BD ALCOHOL SWABS) PadM, Apply 1 each topically as needed., Disp: 100 each, Rfl: 6    aspirin (ECOTRIN) 81 MG EC tablet, Take 1 tablet (81 mg total) by mouth once daily., Disp: 30 tablet, Rfl: 11    atorvastatin (LIPITOR) 80 MG tablet, Take 1 tablet (80 mg total) by mouth every evening., Disp: 90 tablet, Rfl: 0    blood sugar diagnostic (ACCU-CHEK OLGA PLUS TEST STRP) Strp, 1 each by Misc.(Non-Drug; Combo Route) route daily as needed., Disp: 100 each, Rfl: 6    blood sugar diagnostic Strp, To check BG 2 times daily, to use with insurance preferred meter, Disp: 200 strip, Rfl: 2    carvediloL (COREG) 12.5 MG tablet, Take 1 tablet (12.5 mg total) by mouth 2 (two) times daily with meals., Disp: 180 tablet, Rfl: 3    clopidogreL (PLAVIX) 75 mg tablet, Take 1 tablet (75 mg total) by mouth once daily., Disp: 90 tablet, Rfl: 1    glipiZIDE (GLUCOTROL) 10 MG tablet, Take 1 tablet (10 mg total) by mouth 2 (two) times daily with meals., Disp: 180 tablet, Rfl: 1    lancets Misc, To check BG 2 times daily, to use with insurance preferred meter, Disp: 100 each, Rfl: 0    metFORMIN (GLUCOPHAGE) 1000 MG tablet, Take 1 tablet (1,000 mg total) by mouth 2 (two) times daily with meals. Please hold medication for 2 more days. Can restart on 12/3/21, Disp: 180 tablet, Rfl:  1    MULTIVITAMIN W-MINERALS/LUTEIN (CENTRUM SILVER ORAL), Take 1 capsule by mouth once daily., Disp: , Rfl:     SITagliptin (JANUVIA) 100 MG Tab, Take 1 tablet (100 mg total) by mouth once daily., Disp: 30 tablet, Rfl: 11    valsartan (DIOVAN) 80 MG tablet, Take 1 tablet (80 mg total) by mouth once daily., Disp: 90 tablet, Rfl: 0     Does patient have record of home blood pressure readings yes. Readings have been averaging 140-160's/60-70's.   Pt's latest BP log on your desk.  Last dose of blood pressure medication was taken at 7:00-7:30 am this morning.  Patient is asymptomatic.     Blood pressure readiaftng er 15 minutes was 130/78, Pulse:64  Dr. Adams notified notified.

## 2022-04-08 NOTE — TELEPHONE ENCOUNTER
I went over his blood pressure log and his blood pressure is still slightly above goal with the systolic and I would recommend for him to continue the valsartan 80 mg daily which I sent a refill since his potassium was on the high side and I also sent in chlorthalidone half a tablet to take which not only will improve his blood pressure but is known to lower his potassium level thus should correct his elevated potassium level.  Please let patient know of plan and have him get his electrolytes and potassium checked within a week and have him send his blood pressure log to me or you thank you so much for the update

## 2022-04-13 ENCOUNTER — PATIENT MESSAGE (OUTPATIENT)
Dept: FAMILY MEDICINE | Facility: CLINIC | Age: 72
End: 2022-04-13
Payer: MEDICARE

## 2022-04-13 DIAGNOSIS — I15.2 HYPERTENSION ASSOCIATED WITH DIABETES: Primary | ICD-10-CM

## 2022-04-13 DIAGNOSIS — E11.59 HYPERTENSION ASSOCIATED WITH DIABETES: Primary | ICD-10-CM

## 2022-04-13 DIAGNOSIS — E87.5 HYPERKALEMIA: ICD-10-CM

## 2022-04-13 LAB
BUN SERPL-MCNC: 23 MG/DL (ref 8–27)
BUN/CREAT SERPL: 16 (ref 10–24)
CALCIUM SERPL-MCNC: 9.2 MG/DL (ref 8.6–10.2)
CHLORIDE SERPL-SCNC: 107 MMOL/L (ref 96–106)
CO2 SERPL-SCNC: 20 MMOL/L (ref 20–29)
CREAT SERPL-MCNC: 1.41 MG/DL (ref 0.76–1.27)
EST. GFR  (NO RACE VARIABLE): 53 ML/MIN/1.73
GLUCOSE SERPL-MCNC: 146 MG/DL (ref 65–99)
POTASSIUM SERPL-SCNC: 5.6 MMOL/L (ref 3.5–5.2)
SODIUM SERPL-SCNC: 141 MMOL/L (ref 134–144)

## 2022-04-13 RX ORDER — AMLODIPINE BESYLATE 5 MG/1
5 TABLET ORAL DAILY
Qty: 90 TABLET | Refills: 0 | Status: SHIPPED | OUTPATIENT
Start: 2022-04-13 | End: 2022-05-09

## 2022-04-13 NOTE — PROGRESS NOTES
Please let the patient know that he had a high potassium level and thus I recommend he stop taking valsartan since this can increase potassium levels.  I sent in amlodipine at a low-dose that he can take instead of the valsartan.  He should continue the chlorthalidone.  I would like to recheck his potassium level in a week and please set up blood work thank you also he can work on avoiding high potassium foods

## 2022-04-14 ENCOUNTER — TELEPHONE (OUTPATIENT)
Dept: FAMILY MEDICINE | Facility: CLINIC | Age: 72
End: 2022-04-14
Payer: MEDICARE

## 2022-04-14 NOTE — TELEPHONE ENCOUNTER
----- Message from Yamile Cortez sent at 4/14/2022  1:46 PM CDT -----  Type: Needs Medical Advice  Who Called:  Pt  Best Call Back Number: [phone]  Additional Information: Pt returning call to discuss message from Dr. Adams--please advise--Thank you

## 2022-04-18 ENCOUNTER — HOSPITAL ENCOUNTER (INPATIENT)
Facility: HOSPITAL | Age: 72
LOS: 3 days | Discharge: HOME | DRG: 250 | End: 2022-04-21
Attending: EMERGENCY MEDICINE | Admitting: INTERNAL MEDICINE
Payer: MEDICARE

## 2022-04-18 DIAGNOSIS — I50.43 ACUTE ON CHRONIC COMBINED SYSTOLIC AND DIASTOLIC CHF (CONGESTIVE HEART FAILURE): ICD-10-CM

## 2022-04-18 DIAGNOSIS — I25.10 CAD (CORONARY ARTERY DISEASE): ICD-10-CM

## 2022-04-18 DIAGNOSIS — Z98.62 STATUS POST ANGIOPLASTY: ICD-10-CM

## 2022-04-18 DIAGNOSIS — I21.4 NSTEMI (NON-ST ELEVATED MYOCARDIAL INFARCTION): ICD-10-CM

## 2022-04-18 DIAGNOSIS — J81.1 PULMONARY EDEMA: ICD-10-CM

## 2022-04-18 DIAGNOSIS — I21.4 NSTEMI (NON-ST ELEVATION MYOCARDIAL INFARCTION): Primary | ICD-10-CM

## 2022-04-18 DIAGNOSIS — R07.9 CHEST PAIN: ICD-10-CM

## 2022-04-18 DIAGNOSIS — R07.89 OTHER CHEST PAIN: ICD-10-CM

## 2022-04-18 PROBLEM — E87.5 HYPERKALEMIA: Status: ACTIVE | Noted: 2022-04-18

## 2022-04-18 PROBLEM — N18.9 ACUTE KIDNEY INJURY SUPERIMPOSED ON CHRONIC KIDNEY DISEASE: Status: ACTIVE | Noted: 2022-04-18

## 2022-04-18 PROBLEM — R79.89 TROPONIN I ABOVE REFERENCE RANGE: Status: ACTIVE | Noted: 2022-04-18

## 2022-04-18 PROBLEM — N17.9 ACUTE KIDNEY INJURY SUPERIMPOSED ON CHRONIC KIDNEY DISEASE: Status: ACTIVE | Noted: 2022-04-18

## 2022-04-18 LAB
ALBUMIN SERPL BCP-MCNC: 4 G/DL (ref 3.5–5.2)
ALP SERPL-CCNC: 78 U/L (ref 55–135)
ALT SERPL W/O P-5'-P-CCNC: 24 U/L (ref 10–44)
ANION GAP SERPL CALC-SCNC: 11 MMOL/L (ref 8–16)
ANION GAP SERPL CALC-SCNC: 7 MMOL/L (ref 8–16)
APTT PPP: 27.8 SEC (ref 23.3–35.1)
APTT PPP: 28.4 SEC (ref 23.3–35.1)
APTT PPP: 64.7 SEC (ref 23.3–35.1)
AST SERPL-CCNC: 21 U/L (ref 10–40)
BASOPHILS # BLD AUTO: 0.07 K/UL (ref 0–0.2)
BASOPHILS # BLD AUTO: 0.11 K/UL (ref 0–0.2)
BASOPHILS NFR BLD: 0.7 % (ref 0–1.9)
BASOPHILS NFR BLD: 1 % (ref 0–1.9)
BILIRUB SERPL-MCNC: 0.9 MG/DL (ref 0.1–1)
BNP SERPL-MCNC: 136 PG/ML (ref 0–99)
BUN SERPL-MCNC: 28 MG/DL (ref 8–23)
BUN SERPL-MCNC: 32 MG/DL (ref 8–23)
CALCIUM SERPL-MCNC: 8.6 MG/DL (ref 8.7–10.5)
CALCIUM SERPL-MCNC: 9.1 MG/DL (ref 8.7–10.5)
CHLORIDE SERPL-SCNC: 107 MMOL/L (ref 95–110)
CHLORIDE SERPL-SCNC: 109 MMOL/L (ref 95–110)
CO2 SERPL-SCNC: 22 MMOL/L (ref 23–29)
CO2 SERPL-SCNC: 23 MMOL/L (ref 23–29)
CREAT SERPL-MCNC: 1.9 MG/DL (ref 0.5–1.4)
CREAT SERPL-MCNC: 2 MG/DL (ref 0.5–1.4)
DIFFERENTIAL METHOD: ABNORMAL
DIFFERENTIAL METHOD: ABNORMAL
EOSINOPHIL # BLD AUTO: 0.1 K/UL (ref 0–0.5)
EOSINOPHIL # BLD AUTO: 0.3 K/UL (ref 0–0.5)
EOSINOPHIL NFR BLD: 0.8 % (ref 0–8)
EOSINOPHIL NFR BLD: 2.6 % (ref 0–8)
ERYTHROCYTE [DISTWIDTH] IN BLOOD BY AUTOMATED COUNT: 14 % (ref 11.5–14.5)
ERYTHROCYTE [DISTWIDTH] IN BLOOD BY AUTOMATED COUNT: 14.3 % (ref 11.5–14.5)
EST. GFR  (AFRICAN AMERICAN): 37.2 ML/MIN/1.73 M^2
EST. GFR  (AFRICAN AMERICAN): 39.6 ML/MIN/1.73 M^2
EST. GFR  (NON AFRICAN AMERICAN): 32.2 ML/MIN/1.73 M^2
EST. GFR  (NON AFRICAN AMERICAN): 34.2 ML/MIN/1.73 M^2
GLUCOSE SERPL-MCNC: 126 MG/DL (ref 70–110)
GLUCOSE SERPL-MCNC: 129 MG/DL (ref 70–110)
GLUCOSE SERPL-MCNC: 174 MG/DL (ref 70–110)
GLUCOSE SERPL-MCNC: 192 MG/DL (ref 70–110)
GLUCOSE SERPL-MCNC: 220 MG/DL (ref 70–110)
GLUCOSE SERPL-MCNC: 255 MG/DL (ref 70–110)
HCT VFR BLD AUTO: 37.3 % (ref 40–54)
HCT VFR BLD AUTO: 38.2 % (ref 40–54)
HGB BLD-MCNC: 12.5 G/DL (ref 14–18)
HGB BLD-MCNC: 12.6 G/DL (ref 14–18)
IMM GRANULOCYTES # BLD AUTO: 0.03 K/UL (ref 0–0.04)
IMM GRANULOCYTES # BLD AUTO: 0.03 K/UL (ref 0–0.04)
IMM GRANULOCYTES NFR BLD AUTO: 0.3 % (ref 0–0.5)
IMM GRANULOCYTES NFR BLD AUTO: 0.3 % (ref 0–0.5)
INR PPP: 1.3
INR PPP: 1.3
LYMPHOCYTES # BLD AUTO: 1.2 K/UL (ref 1–4.8)
LYMPHOCYTES # BLD AUTO: 1.6 K/UL (ref 1–4.8)
LYMPHOCYTES NFR BLD: 10.9 % (ref 18–48)
LYMPHOCYTES NFR BLD: 16.7 % (ref 18–48)
MAGNESIUM SERPL-MCNC: 1.7 MG/DL (ref 1.6–2.6)
MCH RBC QN AUTO: 28.9 PG (ref 27–31)
MCH RBC QN AUTO: 29.4 PG (ref 27–31)
MCHC RBC AUTO-ENTMCNC: 33 G/DL (ref 32–36)
MCHC RBC AUTO-ENTMCNC: 33.5 G/DL (ref 32–36)
MCV RBC AUTO: 86 FL (ref 82–98)
MCV RBC AUTO: 89 FL (ref 82–98)
MONOCYTES # BLD AUTO: 0.5 K/UL (ref 0.3–1)
MONOCYTES # BLD AUTO: 0.7 K/UL (ref 0.3–1)
MONOCYTES NFR BLD: 4.7 % (ref 4–15)
MONOCYTES NFR BLD: 6.9 % (ref 4–15)
NEUTROPHILS # BLD AUTO: 7.1 K/UL (ref 1.8–7.7)
NEUTROPHILS # BLD AUTO: 9.1 K/UL (ref 1.8–7.7)
NEUTROPHILS NFR BLD: 74.6 % (ref 38–73)
NEUTROPHILS NFR BLD: 80.5 % (ref 38–73)
NRBC BLD-RTO: 0 /100 WBC
NRBC BLD-RTO: 0 /100 WBC
PLATELET # BLD AUTO: 150 K/UL (ref 150–450)
PLATELET # BLD AUTO: 91 K/UL (ref 150–450)
PMV BLD AUTO: 10.6 FL (ref 9.2–12.9)
PMV BLD AUTO: 11 FL (ref 9.2–12.9)
POC ACTIVATED CLOTTING TIME K: 231 SEC (ref 74–137)
POTASSIUM SERPL-SCNC: 5.2 MMOL/L (ref 3.5–5.1)
POTASSIUM SERPL-SCNC: 6.1 MMOL/L (ref 3.5–5.1)
PROT SERPL-MCNC: 6.9 G/DL (ref 6–8.4)
PROTHROMBIN TIME: 15.1 SEC (ref 11.4–13.7)
PROTHROMBIN TIME: 15.5 SEC (ref 11.4–13.7)
RBC # BLD AUTO: 4.29 M/UL (ref 4.6–6.2)
RBC # BLD AUTO: 4.32 M/UL (ref 4.6–6.2)
SAMPLE: ABNORMAL
SARS-COV-2 RDRP RESP QL NAA+PROBE: NEGATIVE
SODIUM SERPL-SCNC: 137 MMOL/L (ref 136–145)
SODIUM SERPL-SCNC: 142 MMOL/L (ref 136–145)
TROPONIN I SERPL DL<=0.01 NG/ML-MCNC: 0.04 NG/ML
TROPONIN I SERPL DL<=0.01 NG/ML-MCNC: 0.72 NG/ML
TROPONIN I SERPL DL<=0.01 NG/ML-MCNC: 10.36 NG/ML
TROPONIN I SERPL DL<=0.01 NG/ML-MCNC: 9.88 NG/ML
WBC # BLD AUTO: 11.23 K/UL (ref 3.9–12.7)
WBC # BLD AUTO: 9.57 K/UL (ref 3.9–12.7)

## 2022-04-18 PROCEDURE — 63600175 PHARM REV CODE 636 W HCPCS: Performed by: EMERGENCY MEDICINE

## 2022-04-18 PROCEDURE — 99152 PR MOD CONSCIOUS SEDATION, SAME PHYS, 5+ YRS, FIRST 15 MIN: ICD-10-PCS | Mod: ,,, | Performed by: INTERNAL MEDICINE

## 2022-04-18 PROCEDURE — 85730 THROMBOPLASTIN TIME PARTIAL: CPT | Performed by: INTERNAL MEDICINE

## 2022-04-18 PROCEDURE — 25000003 PHARM REV CODE 250: Performed by: INTERNAL MEDICINE

## 2022-04-18 PROCEDURE — 99222 1ST HOSP IP/OBS MODERATE 55: CPT | Mod: ,,, | Performed by: INTERNAL MEDICINE

## 2022-04-18 PROCEDURE — 85610 PROTHROMBIN TIME: CPT | Performed by: EMERGENCY MEDICINE

## 2022-04-18 PROCEDURE — 99285 EMERGENCY DEPT VISIT HI MDM: CPT | Mod: 25

## 2022-04-18 PROCEDURE — 94761 N-INVAS EAR/PLS OXIMETRY MLT: CPT

## 2022-04-18 PROCEDURE — 63600175 PHARM REV CODE 636 W HCPCS: Performed by: INTERNAL MEDICINE

## 2022-04-18 PROCEDURE — 85025 COMPLETE CBC W/AUTO DIFF WBC: CPT | Mod: 91 | Performed by: INTERNAL MEDICINE

## 2022-04-18 PROCEDURE — 83880 ASSAY OF NATRIURETIC PEPTIDE: CPT | Performed by: EMERGENCY MEDICINE

## 2022-04-18 PROCEDURE — 99900031 HC PATIENT EDUCATION (STAT)

## 2022-04-18 PROCEDURE — 63600175 PHARM REV CODE 636 W HCPCS: Performed by: NURSE PRACTITIONER

## 2022-04-18 PROCEDURE — C1894 INTRO/SHEATH, NON-LASER: HCPCS | Performed by: INTERNAL MEDICINE

## 2022-04-18 PROCEDURE — C1769 GUIDE WIRE: HCPCS | Performed by: INTERNAL MEDICINE

## 2022-04-18 PROCEDURE — 99153 MOD SED SAME PHYS/QHP EA: CPT | Performed by: INTERNAL MEDICINE

## 2022-04-18 PROCEDURE — 85730 THROMBOPLASTIN TIME PARTIAL: CPT | Mod: 91 | Performed by: EMERGENCY MEDICINE

## 2022-04-18 PROCEDURE — 84484 ASSAY OF TROPONIN QUANT: CPT | Mod: 91 | Performed by: EMERGENCY MEDICINE

## 2022-04-18 PROCEDURE — 92920 PR PTCA: ICD-10-PCS | Mod: 53,RC,, | Performed by: INTERNAL MEDICINE

## 2022-04-18 PROCEDURE — C1725 CATH, TRANSLUMIN NON-LASER: HCPCS | Performed by: INTERNAL MEDICINE

## 2022-04-18 PROCEDURE — 80053 COMPREHEN METABOLIC PANEL: CPT | Performed by: EMERGENCY MEDICINE

## 2022-04-18 PROCEDURE — 85025 COMPLETE CBC W/AUTO DIFF WBC: CPT | Performed by: EMERGENCY MEDICINE

## 2022-04-18 PROCEDURE — 94799 UNLISTED PULMONARY SVC/PX: CPT

## 2022-04-18 PROCEDURE — 82962 GLUCOSE BLOOD TEST: CPT

## 2022-04-18 PROCEDURE — 27000221 HC OXYGEN, UP TO 24 HOURS

## 2022-04-18 PROCEDURE — 93010 ELECTROCARDIOGRAM REPORT: CPT | Mod: ,,, | Performed by: SPECIALIST

## 2022-04-18 PROCEDURE — 84484 ASSAY OF TROPONIN QUANT: CPT | Mod: 91 | Performed by: INTERNAL MEDICINE

## 2022-04-18 PROCEDURE — 93010 EKG 12-LEAD: ICD-10-PCS | Mod: ,,, | Performed by: SPECIALIST

## 2022-04-18 PROCEDURE — 25000003 PHARM REV CODE 250: Performed by: EMERGENCY MEDICINE

## 2022-04-18 PROCEDURE — 96374 THER/PROPH/DIAG INJ IV PUSH: CPT

## 2022-04-18 PROCEDURE — 99152 MOD SED SAME PHYS/QHP 5/>YRS: CPT | Mod: ,,, | Performed by: INTERNAL MEDICINE

## 2022-04-18 PROCEDURE — 36415 COLL VENOUS BLD VENIPUNCTURE: CPT | Performed by: EMERGENCY MEDICINE

## 2022-04-18 PROCEDURE — C1887 CATHETER, GUIDING: HCPCS | Performed by: INTERNAL MEDICINE

## 2022-04-18 PROCEDURE — 83735 ASSAY OF MAGNESIUM: CPT | Performed by: EMERGENCY MEDICINE

## 2022-04-18 PROCEDURE — 93454 CORONARY ARTERY ANGIO S&I: CPT | Performed by: INTERNAL MEDICINE

## 2022-04-18 PROCEDURE — 99222 PR INITIAL HOSPITAL CARE,LEVL II: ICD-10-PCS | Mod: ,,, | Performed by: INTERNAL MEDICINE

## 2022-04-18 PROCEDURE — 84484 ASSAY OF TROPONIN QUANT: CPT | Performed by: NURSE PRACTITIONER

## 2022-04-18 PROCEDURE — 99900035 HC TECH TIME PER 15 MIN (STAT)

## 2022-04-18 PROCEDURE — 94660 CPAP INITIATION&MGMT: CPT

## 2022-04-18 PROCEDURE — 36415 COLL VENOUS BLD VENIPUNCTURE: CPT | Performed by: INTERNAL MEDICINE

## 2022-04-18 PROCEDURE — 25000242 PHARM REV CODE 250 ALT 637 W/ HCPCS: Performed by: EMERGENCY MEDICINE

## 2022-04-18 PROCEDURE — 21000000 HC CCU ICU ROOM CHARGE

## 2022-04-18 PROCEDURE — 85610 PROTHROMBIN TIME: CPT | Mod: 91 | Performed by: INTERNAL MEDICINE

## 2022-04-18 PROCEDURE — 80048 BASIC METABOLIC PNL TOTAL CA: CPT | Performed by: NURSE PRACTITIONER

## 2022-04-18 PROCEDURE — 96375 TX/PRO/DX INJ NEW DRUG ADDON: CPT

## 2022-04-18 PROCEDURE — U0002 COVID-19 LAB TEST NON-CDC: HCPCS | Performed by: EMERGENCY MEDICINE

## 2022-04-18 PROCEDURE — 93458 PR CATH PLACE/CORON ANGIO, IMG SUPER/INTERP,W LEFT HEART VENTRICULOGRAPHY: ICD-10-PCS | Mod: 26,51,59, | Performed by: INTERNAL MEDICINE

## 2022-04-18 PROCEDURE — 94640 AIRWAY INHALATION TREATMENT: CPT

## 2022-04-18 PROCEDURE — 93458 L HRT ARTERY/VENTRICLE ANGIO: CPT | Mod: 26,51,59, | Performed by: INTERNAL MEDICINE

## 2022-04-18 PROCEDURE — 92920 PRQ TRLUML C ANGIOP 1ART&/BR: CPT | Mod: 53,RC,, | Performed by: INTERNAL MEDICINE

## 2022-04-18 PROCEDURE — 25000003 PHARM REV CODE 250: Performed by: NURSE PRACTITIONER

## 2022-04-18 PROCEDURE — 93005 ELECTROCARDIOGRAM TRACING: CPT | Performed by: SPECIALIST

## 2022-04-18 PROCEDURE — 99152 MOD SED SAME PHYS/QHP 5/>YRS: CPT | Performed by: INTERNAL MEDICINE

## 2022-04-18 RX ORDER — HEPARIN SODIUM 5000 [USP'U]/ML
5000 INJECTION, SOLUTION INTRAVENOUS; SUBCUTANEOUS EVERY 8 HOURS
Status: DISCONTINUED | OUTPATIENT
Start: 2022-04-18 | End: 2022-04-18

## 2022-04-18 RX ORDER — AMLODIPINE BESYLATE 5 MG/1
5 TABLET ORAL DAILY
Status: DISCONTINUED | OUTPATIENT
Start: 2022-04-18 | End: 2022-04-20

## 2022-04-18 RX ORDER — POTASSIUM CHLORIDE 20 MEQ/1
40 TABLET, EXTENDED RELEASE ORAL
Status: DISCONTINUED | OUTPATIENT
Start: 2022-04-18 | End: 2022-04-21 | Stop reason: HOSPADM

## 2022-04-18 RX ORDER — INSULIN ASPART 100 [IU]/ML
1-10 INJECTION, SOLUTION INTRAVENOUS; SUBCUTANEOUS
Status: DISCONTINUED | OUTPATIENT
Start: 2022-04-18 | End: 2022-04-21 | Stop reason: HOSPADM

## 2022-04-18 RX ORDER — HYDRALAZINE HYDROCHLORIDE 25 MG/1
25 TABLET, FILM COATED ORAL EVERY 8 HOURS
Status: DISCONTINUED | OUTPATIENT
Start: 2022-04-18 | End: 2022-04-21 | Stop reason: HOSPADM

## 2022-04-18 RX ORDER — FENTANYL CITRATE 50 UG/ML
INJECTION, SOLUTION INTRAMUSCULAR; INTRAVENOUS
Status: DISCONTINUED | OUTPATIENT
Start: 2022-04-18 | End: 2022-04-18 | Stop reason: HOSPADM

## 2022-04-18 RX ORDER — MAGNESIUM SULFATE HEPTAHYDRATE 40 MG/ML
2 INJECTION, SOLUTION INTRAVENOUS
Status: DISCONTINUED | OUTPATIENT
Start: 2022-04-18 | End: 2022-04-21 | Stop reason: HOSPADM

## 2022-04-18 RX ORDER — IBUPROFEN 200 MG
16 TABLET ORAL
Status: DISCONTINUED | OUTPATIENT
Start: 2022-04-18 | End: 2022-04-21 | Stop reason: HOSPADM

## 2022-04-18 RX ORDER — SODIUM BICARBONATE 1 MEQ/ML
50 SYRINGE (ML) INTRAVENOUS
Status: COMPLETED | OUTPATIENT
Start: 2022-04-18 | End: 2022-04-18

## 2022-04-18 RX ORDER — HYDRALAZINE HYDROCHLORIDE 20 MG/ML
10 INJECTION INTRAMUSCULAR; INTRAVENOUS ONCE
Status: DISCONTINUED | OUTPATIENT
Start: 2022-04-18 | End: 2022-04-18

## 2022-04-18 RX ORDER — ATORVASTATIN CALCIUM 40 MG/1
80 TABLET, FILM COATED ORAL NIGHTLY
Status: DISCONTINUED | OUTPATIENT
Start: 2022-04-18 | End: 2022-04-21 | Stop reason: HOSPADM

## 2022-04-18 RX ORDER — MAGNESIUM SULFATE HEPTAHYDRATE 40 MG/ML
4 INJECTION, SOLUTION INTRAVENOUS
Status: DISCONTINUED | OUTPATIENT
Start: 2022-04-18 | End: 2022-04-21 | Stop reason: HOSPADM

## 2022-04-18 RX ORDER — LIDOCAINE HYDROCHLORIDE 10 MG/ML
INJECTION, SOLUTION EPIDURAL; INFILTRATION; INTRACAUDAL; PERINEURAL
Status: DISCONTINUED | OUTPATIENT
Start: 2022-04-18 | End: 2022-04-18 | Stop reason: HOSPADM

## 2022-04-18 RX ORDER — CLOPIDOGREL BISULFATE 75 MG/1
75 TABLET ORAL DAILY
Status: DISCONTINUED | OUTPATIENT
Start: 2022-04-18 | End: 2022-04-20

## 2022-04-18 RX ORDER — FUROSEMIDE 10 MG/ML
40 INJECTION INTRAMUSCULAR; INTRAVENOUS
Status: COMPLETED | OUTPATIENT
Start: 2022-04-18 | End: 2022-04-18

## 2022-04-18 RX ORDER — SODIUM CHLORIDE 0.9 % (FLUSH) 0.9 %
10 SYRINGE (ML) INJECTION
Status: DISCONTINUED | OUTPATIENT
Start: 2022-04-18 | End: 2022-04-21 | Stop reason: HOSPADM

## 2022-04-18 RX ORDER — NITROGLYCERIN 0.4 MG/1
0.4 TABLET SUBLINGUAL EVERY 5 MIN PRN
Status: DISCONTINUED | OUTPATIENT
Start: 2022-04-18 | End: 2022-04-21 | Stop reason: HOSPADM

## 2022-04-18 RX ORDER — HEPARIN SODIUM,PORCINE/D5W 25000/250
12 INTRAVENOUS SOLUTION INTRAVENOUS CONTINUOUS
Status: DISCONTINUED | OUTPATIENT
Start: 2022-04-18 | End: 2022-04-18

## 2022-04-18 RX ORDER — IBUPROFEN 200 MG
24 TABLET ORAL
Status: DISCONTINUED | OUTPATIENT
Start: 2022-04-18 | End: 2022-04-21 | Stop reason: HOSPADM

## 2022-04-18 RX ORDER — MAGNESIUM SULFATE 1 G/100ML
1 INJECTION INTRAVENOUS
Status: DISCONTINUED | OUTPATIENT
Start: 2022-04-18 | End: 2022-04-21 | Stop reason: HOSPADM

## 2022-04-18 RX ORDER — MIDAZOLAM HYDROCHLORIDE 1 MG/ML
INJECTION INTRAMUSCULAR; INTRAVENOUS
Status: DISCONTINUED | OUTPATIENT
Start: 2022-04-18 | End: 2022-04-18 | Stop reason: HOSPADM

## 2022-04-18 RX ORDER — POTASSIUM CHLORIDE 7.45 MG/ML
40 INJECTION INTRAVENOUS
Status: DISCONTINUED | OUTPATIENT
Start: 2022-04-18 | End: 2022-04-21 | Stop reason: HOSPADM

## 2022-04-18 RX ORDER — HEPARIN SODIUM,PORCINE/D5W 25000/250
0-40 INTRAVENOUS SOLUTION INTRAVENOUS CONTINUOUS
Status: DISCONTINUED | OUTPATIENT
Start: 2022-04-18 | End: 2022-04-18

## 2022-04-18 RX ORDER — GLUCAGON 1 MG
1 KIT INJECTION
Status: DISCONTINUED | OUTPATIENT
Start: 2022-04-18 | End: 2022-04-21 | Stop reason: HOSPADM

## 2022-04-18 RX ORDER — CARVEDILOL 12.5 MG/1
12.5 TABLET ORAL 2 TIMES DAILY WITH MEALS
Status: DISCONTINUED | OUTPATIENT
Start: 2022-04-18 | End: 2022-04-21 | Stop reason: HOSPADM

## 2022-04-18 RX ORDER — HYDRALAZINE HYDROCHLORIDE 20 MG/ML
10 INJECTION INTRAMUSCULAR; INTRAVENOUS
Status: COMPLETED | OUTPATIENT
Start: 2022-04-18 | End: 2022-04-18

## 2022-04-18 RX ORDER — ALBUTEROL SULFATE 0.83 MG/ML
10 SOLUTION RESPIRATORY (INHALATION)
Status: COMPLETED | OUTPATIENT
Start: 2022-04-18 | End: 2022-04-18

## 2022-04-18 RX ORDER — FUROSEMIDE 10 MG/ML
40 INJECTION INTRAMUSCULAR; INTRAVENOUS ONCE
Status: COMPLETED | OUTPATIENT
Start: 2022-04-18 | End: 2022-04-18

## 2022-04-18 RX ORDER — POTASSIUM CHLORIDE 20 MEQ/1
20 TABLET, EXTENDED RELEASE ORAL
Status: DISCONTINUED | OUTPATIENT
Start: 2022-04-18 | End: 2022-04-21 | Stop reason: HOSPADM

## 2022-04-18 RX ORDER — ACETAMINOPHEN 325 MG/1
650 TABLET ORAL EVERY 4 HOURS PRN
Status: DISCONTINUED | OUTPATIENT
Start: 2022-04-18 | End: 2022-04-21 | Stop reason: HOSPADM

## 2022-04-18 RX ORDER — ASPIRIN 325 MG
325 TABLET ORAL
Status: DISCONTINUED | OUTPATIENT
Start: 2022-04-18 | End: 2022-04-18

## 2022-04-18 RX ORDER — SODIUM CHLORIDE 9 MG/ML
INJECTION, SOLUTION INTRAVENOUS CONTINUOUS
Status: ACTIVE | OUTPATIENT
Start: 2022-04-18 | End: 2022-04-19

## 2022-04-18 RX ORDER — ASPIRIN 81 MG/1
81 TABLET ORAL DAILY
Status: DISCONTINUED | OUTPATIENT
Start: 2022-04-19 | End: 2022-04-21 | Stop reason: HOSPADM

## 2022-04-18 RX ORDER — ISOSORBIDE DINITRATE 10 MG/1
20 TABLET ORAL 3 TIMES DAILY
Status: DISCONTINUED | OUTPATIENT
Start: 2022-04-18 | End: 2022-04-19

## 2022-04-18 RX ORDER — HEPARIN SODIUM,PORCINE/D5W 25000/250
12 INTRAVENOUS SOLUTION INTRAVENOUS CONTINUOUS
Status: DISCONTINUED | OUTPATIENT
Start: 2022-04-18 | End: 2022-04-20

## 2022-04-18 RX ORDER — HEPARIN SODIUM 10000 [USP'U]/ML
INJECTION, SOLUTION INTRAVENOUS; SUBCUTANEOUS
Status: DISCONTINUED | OUTPATIENT
Start: 2022-04-18 | End: 2022-04-18 | Stop reason: HOSPADM

## 2022-04-18 RX ORDER — SODIUM CHLORIDE 9 MG/ML
INJECTION, SOLUTION INTRAVENOUS CONTINUOUS
Status: DISCONTINUED | OUTPATIENT
Start: 2022-04-18 | End: 2022-04-18

## 2022-04-18 RX ORDER — FUROSEMIDE 10 MG/ML
40 INJECTION INTRAMUSCULAR; INTRAVENOUS
Status: DISCONTINUED | OUTPATIENT
Start: 2022-04-18 | End: 2022-04-18

## 2022-04-18 RX ORDER — POTASSIUM CHLORIDE 7.45 MG/ML
20 INJECTION INTRAVENOUS
Status: DISCONTINUED | OUTPATIENT
Start: 2022-04-18 | End: 2022-04-21 | Stop reason: HOSPADM

## 2022-04-18 RX ORDER — HYDRALAZINE HYDROCHLORIDE 20 MG/ML
10 INJECTION INTRAMUSCULAR; INTRAVENOUS EVERY 8 HOURS PRN
Status: DISCONTINUED | OUTPATIENT
Start: 2022-04-18 | End: 2022-04-21 | Stop reason: HOSPADM

## 2022-04-18 RX ORDER — LANOLIN ALCOHOL/MO/W.PET/CERES
800 CREAM (GRAM) TOPICAL
Status: DISCONTINUED | OUTPATIENT
Start: 2022-04-18 | End: 2022-04-21 | Stop reason: HOSPADM

## 2022-04-18 RX ORDER — NITROGLYCERIN 5 MG/ML
INJECTION, SOLUTION INTRAVENOUS
Status: DISCONTINUED | OUTPATIENT
Start: 2022-04-18 | End: 2022-04-18 | Stop reason: HOSPADM

## 2022-04-18 RX ORDER — AMOXICILLIN 250 MG
1 CAPSULE ORAL 2 TIMES DAILY PRN
Status: DISCONTINUED | OUTPATIENT
Start: 2022-04-18 | End: 2022-04-21 | Stop reason: HOSPADM

## 2022-04-18 RX ADMIN — CLOPIDOGREL 75 MG: 75 TABLET, FILM COATED ORAL at 08:04

## 2022-04-18 RX ADMIN — ISOSORBIDE DINITRATE 20 MG: 10 TABLET ORAL at 09:04

## 2022-04-18 RX ADMIN — FUROSEMIDE 40 MG: 10 INJECTION, SOLUTION INTRAMUSCULAR; INTRAVENOUS at 11:04

## 2022-04-18 RX ADMIN — CARVEDILOL 12.5 MG: 12.5 TABLET, FILM COATED ORAL at 05:04

## 2022-04-18 RX ADMIN — SODIUM CHLORIDE: 0.9 INJECTION, SOLUTION INTRAVENOUS at 05:04

## 2022-04-18 RX ADMIN — HEPARIN SODIUM AND DEXTROSE 12 UNITS/KG/HR: 10000; 5 INJECTION INTRAVENOUS at 06:04

## 2022-04-18 RX ADMIN — CALCIUM GLUCONATE 1 G: 98 INJECTION, SOLUTION INTRAVENOUS at 02:04

## 2022-04-18 RX ADMIN — SODIUM BICARBONATE 50 MEQ: 84 INJECTION, SOLUTION INTRAVENOUS at 03:04

## 2022-04-18 RX ADMIN — AMLODIPINE BESYLATE 5 MG: 5 TABLET ORAL at 08:04

## 2022-04-18 RX ADMIN — DEXTROSE MONOHYDRATE 25 G: 25 INJECTION, SOLUTION INTRAVENOUS at 03:04

## 2022-04-18 RX ADMIN — HEPARIN SODIUM AND DEXTROSE 12 UNITS/KG/HR: 10000; 5 INJECTION INTRAVENOUS at 08:04

## 2022-04-18 RX ADMIN — FUROSEMIDE 40 MG: 10 INJECTION, SOLUTION INTRAMUSCULAR; INTRAVENOUS at 02:04

## 2022-04-18 RX ADMIN — ATORVASTATIN CALCIUM 80 MG: 40 TABLET, FILM COATED ORAL at 09:04

## 2022-04-18 RX ADMIN — ISOSORBIDE DINITRATE 20 MG: 10 TABLET ORAL at 05:04

## 2022-04-18 RX ADMIN — INSULIN ASPART 1 UNITS: 100 INJECTION, SOLUTION INTRAVENOUS; SUBCUTANEOUS at 09:04

## 2022-04-18 RX ADMIN — THERA TABS 1 TABLET: TAB at 08:04

## 2022-04-18 RX ADMIN — HYDRALAZINE HYDROCHLORIDE 25 MG: 25 TABLET ORAL at 05:04

## 2022-04-18 RX ADMIN — HYDRALAZINE HYDROCHLORIDE 10 MG: 20 INJECTION, SOLUTION INTRAMUSCULAR; INTRAVENOUS at 11:04

## 2022-04-18 RX ADMIN — NITROGLYCERIN 1 INCH: 20 OINTMENT TOPICAL at 02:04

## 2022-04-18 RX ADMIN — HUMAN INSULIN 10 UNITS: 100 INJECTION, SOLUTION SUBCUTANEOUS at 03:04

## 2022-04-18 RX ADMIN — ALBUTEROL SULFATE 10 MG: 2.5 SOLUTION RESPIRATORY (INHALATION) at 02:04

## 2022-04-18 RX ADMIN — SODIUM ZIRCONIUM CYCLOSILICATE 5 G: 5 POWDER, FOR SUSPENSION ORAL at 04:04

## 2022-04-18 NOTE — PLAN OF CARE
Problem: Adult Inpatient Plan of Care  Goal: Plan of Care Review  Outcome: Ongoing, Progressing  Goal: Patient-Specific Goal (Individualized)  Outcome: Ongoing, Progressing  Goal: Absence of Hospital-Acquired Illness or Injury  Outcome: Ongoing, Progressing  Goal: Optimal Comfort and Wellbeing  Outcome: Ongoing, Progressing  Goal: Readiness for Transition of Care  Outcome: Ongoing, Progressing     Problem: Diabetes Comorbidity  Goal: Blood Glucose Level Within Targeted Range  Outcome: Ongoing, Progressing     Problem: Fluid and Electrolyte Imbalance (Acute Kidney Injury/Impairment)  Goal: Fluid and Electrolyte Balance  Outcome: Ongoing, Progressing     Problem: Oral Intake Inadequate (Acute Kidney Injury/Impairment)  Goal: Optimal Nutrition Intake  Outcome: Ongoing, Progressing     Problem: Renal Function Impairment (Acute Kidney Injury/Impairment)  Goal: Effective Renal Function  Outcome: Ongoing, Progressing

## 2022-04-18 NOTE — CARE UPDATE
04/18/22 0212   Patient Assessment/Suction   Level of Consciousness (AVPU) alert   Respiratory Effort Short of breath   Expansion/Accessory Muscles/Retractions no use of accessory muscles   Rhythm/Pattern, Respiratory assisted mechanically  (BIPAP)   PRE-TX-O2   O2 Device (Oxygen Therapy) BiPAP   Oxygen Concentration (%) 30   Pulse Oximetry Type Continuous   Ready to Wean/Extubation Screen   FIO2<=50 (chart decimal) 0.3   Preset CPAP/BiPAP Settings   Mode Of Delivery BiPAP   $ CPAP/BiPAP Daily Charge BiPAP/CPAP Daily   $ Initial CPAP/BiPAP Setup? Yes   $ Is patient using? Yes   Size of Mask Large   Sized Appropriately? Yes   Equipment Type V60   Airway Device Type large full face mask   Ipap 10   EPAP (cm H2O) 5   Pressure Support (cm H2O) 5   Set Rate (Breaths/Min) 12   ITime (sec) 1   Rise Time (sec) 3   Patient CPAP/BiPAP Settings   Timed Inspiration (Sec) 1   IPAP Rise Time (sec) 3   RR Total (Breaths/Min) 26   Tidal Volume (mL) 700   VE Minute Ventilation (L/min) 18.2 L/min   Peak Inspiratory Pressure (cm H2O) 11   TiTOT (%) 32   Total Leak (L/Min) 0   Patient Trigger - ST Mode Only (%) 94   Education   $ Education BiPAP;15 min   Respiratory Evaluation   $ Care Plan Tech Time 15 min

## 2022-04-18 NOTE — ED NOTES
Dr Willett at bedside. Patient reports he is feeling much better. Verbal orders given to hold this am Coreg but to give Amlodipine.

## 2022-04-18 NOTE — NURSING
Lab call critical result to this nurse patients Tropnoin up to 10.357 Dr. Vernon notified and ordered emergent C. Patient perpared for procedure.

## 2022-04-18 NOTE — Clinical Note
The catheter was inserted into the ostium   right coronary artery. An angiography was performed of the right coronary arteries. The angiography was performed via power injection.

## 2022-04-18 NOTE — Clinical Note
20 ml of contrast were injected throughout the case. 30 mL of contrast was the total wasted during the case. 50 mL was the total amount used during the case.

## 2022-04-18 NOTE — Clinical Note
The catheter was removed from the distal   right coronary artery. The catheter was unable to access the area..

## 2022-04-18 NOTE — ED NOTES
"Patient states "feeling much better than last night." Tolerating BiPAP well. Family at bedside. Pt/Family notified of bed assignment   "

## 2022-04-18 NOTE — PLAN OF CARE
Problem: Adult Inpatient Plan of Care  Goal: Plan of Care Review  4/18/2022 1827 by Mary Stephens RN  Outcome: Ongoing, Progressing  4/18/2022 1715 by Mary Stephens RN  Outcome: Ongoing, Progressing  Goal: Patient-Specific Goal (Individualized)  4/18/2022 1827 by Mary Stephens RN  Outcome: Ongoing, Progressing  4/18/2022 1715 by Mary Stephens RN  Outcome: Ongoing, Progressing  Goal: Absence of Hospital-Acquired Illness or Injury  4/18/2022 1827 by Mary Stephens RN  Outcome: Ongoing, Progressing  4/18/2022 1715 by Mary Stephens RN  Outcome: Ongoing, Progressing  Goal: Optimal Comfort and Wellbeing  4/18/2022 1827 by Mary Stephens RN  Outcome: Ongoing, Progressing  4/18/2022 1715 by Mary Stephens RN  Outcome: Ongoing, Progressing  Goal: Readiness for Transition of Care  4/18/2022 1827 by Mary Stephens RN  Outcome: Ongoing, Progressing  4/18/2022 1715 by Mary Stephens RN  Outcome: Ongoing, Progressing     Problem: Diabetes Comorbidity  Goal: Blood Glucose Level Within Targeted Range  4/18/2022 1827 by Mary Stephens RN  Outcome: Ongoing, Progressing  4/18/2022 1715 by Mary Stephens RN  Outcome: Ongoing, Progressing     Problem: Fluid and Electrolyte Imbalance (Acute Kidney Injury/Impairment)  Goal: Fluid and Electrolyte Balance  4/18/2022 1827 by Mary Stephens RN  Outcome: Ongoing, Progressing  4/18/2022 1715 by Mary Stephens RN  Outcome: Ongoing, Progressing     Problem: Oral Intake Inadequate (Acute Kidney Injury/Impairment)  Goal: Optimal Nutrition Intake  4/18/2022 1827 by Mary Stephens RN  Outcome: Ongoing, Progressing  4/18/2022 1715 by Mary Stephens RN  Outcome: Ongoing, Progressing     Problem: Renal Function Impairment (Acute Kidney Injury/Impairment)  Goal: Effective Renal Function  4/18/2022 1827 by Mary Stephens RN  Outcome: Ongoing, Progressing  4/18/2022 1715 by Mary Stephens RN  Outcome: Ongoing, Progressing

## 2022-04-18 NOTE — SUBJECTIVE & OBJECTIVE
Interval History: Ms Levin is feeling better post IV furosemide and BIPAP. Unsuccessful attempt at PDA intervention    Review of Systems   Constitutional:  Positive for activity change, diaphoresis and fatigue.   HENT: Negative.     Eyes: Negative.    Respiratory:  Positive for cough, chest tightness, shortness of breath and wheezing.    Cardiovascular: Negative.    Gastrointestinal:  Positive for abdominal distention. Negative for nausea and vomiting.   Endocrine: Positive for heat intolerance.   Genitourinary:  Positive for decreased urine volume.   Musculoskeletal:  Positive for arthralgias and myalgias.   Skin: Negative.    Allergic/Immunologic: Negative.    Neurological:  Positive for weakness.   Hematological:  Bruises/bleeds easily.   Psychiatric/Behavioral:  The patient is nervous/anxious.    Objective:     Vital Signs (Most Recent):  Temp: 98.3 °F (36.8 °C) (04/18/22 1140)  Pulse: 72 (04/18/22 1520)  Resp: 17 (04/18/22 1520)  BP: (!) 172/73 (04/18/22 1520)  SpO2: 98 % (04/18/22 1520)   Vital Signs (24h Range):  Temp:  [97.9 °F (36.6 °C)-98.3 °F (36.8 °C)] 98.3 °F (36.8 °C)  Pulse:  [] 72  Resp:  [15-30] 17  SpO2:  [95 %-100 %] 98 %  BP: (142-214)/(59-95) 172/73     Weight: 98 kg (216 lb)  Body mass index is 31.9 kg/m².    Intake/Output Summary (Last 24 hours) at 4/18/2022 1532  Last data filed at 4/18/2022 0700  Gross per 24 hour   Intake 100 ml   Output 1975 ml   Net -1875 ml      Physical Exam  Vitals and nursing note reviewed.   Constitutional:       General: He is in acute distress.      Appearance: He is ill-appearing and diaphoretic.   HENT:      Head: Normocephalic and atraumatic.      Nose: Nose normal.      Mouth/Throat:      Mouth: Mucous membranes are moist.   Eyes:      Extraocular Movements: Extraocular movements intact.      Pupils: Pupils are equal, round, and reactive to light.   Neck:      Comments: Use of accessory muscles  Cardiovascular:      Rate and Rhythm: Normal rate and regular  rhythm.      Heart sounds:     Gallop present.   Pulmonary:      Breath sounds: Rhonchi and rales present.   Abdominal:      General: There is distension.      Tenderness: There is no abdominal tenderness. There is no guarding or rebound.   Musculoskeletal:         General: Normal range of motion.      Cervical back: Normal range of motion and neck supple.      Right lower leg: Edema present.      Left lower leg: Edema present.   Skin:     General: Skin is warm.   Neurological:      Mental Status: He is alert and oriented to person, place, and time.   Psychiatric:      Comments: Very anxious       Significant Labs: All pertinent labs within the past 24 hours have been reviewed.  Recent Lab Results  (Last 5 results in the past 24 hours)        04/18/22  1314   04/18/22  1215   04/18/22  1115   04/18/22  1113   04/18/22  0456        Anion Gap         11       aPTT     64.7  Comment: Therapeutic Range of 67.5-105.1 secs.  Equivalent to Heparin Concentration of anti-Xa 0.3-0.7 IU/ml.  Result consistent with patient history  patient is on heparin             BUN         32       Calcium         9.1       Chloride         109       CO2         22       Creatinine         2.0       eGFR if          37.2       eGFR if non          32.2  Comment: Calculation used to obtain the estimated glomerular filtration  rate (eGFR) is the CKD-EPI equation.          Glucose         174       POC ACTIVATED CLOTTING TIME K 231               POC Glucose   126             Potassium         5.2       Sample ARTERIAL               Sodium         142       Troponin I       10.357  Comment: troponin   critical result(s) called and verbal readback obtained   from yenni tinoco rn by [facility] 04/18/2022 12:09     0.720  Comment: trop critical result(s) repeated. Called and verbal readback   obtained from tory grimes rn er by [facility] 04/18/2022 05:39                                Significant Imaging: I have reviewed all  pertinent imaging results/findings within the past 24 hours.

## 2022-04-18 NOTE — Clinical Note
MD aware heparin gtt going prior to cath lab arrival. Orders 4,000 units iv heparin- given by s.Tschume, RN per Md order.

## 2022-04-18 NOTE — NURSING
Patient arrived to unit via stretcher. Patient with no S/S of acute distress at time of arrival and O2 SATs 98% on RA and Heparin IV at 11.89/kg/min to left hand PIV. Patient transferred to bed with assistance cardiac leads placed on patient via bed side monitor VS obtained and charted. Patients bed low locked call light in reach. Patient refused bed alarm at this time stating he will call and will not attempt to get up with out assistance. Patient educated on the purpose of bed alarm and increased patient safety and patient still declined.

## 2022-04-18 NOTE — Clinical Note
The catheter was inserted into the ostium   left main. An angiography was performed of the right coronary arteries. Multiple views were taken. The angiography was performed via power injection.

## 2022-04-18 NOTE — HOSPITAL COURSE
4/18/2022  Mr Childs had severe dyspnea this AM responding to BIPAP and IV furosemide. His troponin went from 0.7 to 10 and he was taken to the cath lab. He has a PDA lesion that could not be stented this date. Transfer to [facility] main-Dr Vernon considered but will not occur at present    I, Dr. Cha, have assumed care of patient 4/19.  Patient presented to the ED after waking up with severe SOB and chest pain.  CXR revealed pulmonary edema.  Labs significant for mild EMILEE and K of 6.1 which was shifted.  Troponin initially normal but trended up to 10.  Cardiology consulted.  Patient placed on heparin gtt and given IV lasix.  He was taken for LHC 4/18 with the below results:    The pre-procedure left ventricular end diastolic pressure was 5.  The RPDA lesion was 99% stenosed.  Unchanged left coronary artery system with severe mid LAD stenosis which gives septal collaterals to the right PDA  patent proximal and mid to distal RCA stents with severe subtotal greater than 90% ostial InStent restenoses of right PDA stent.  Unable to balloon the stent as balloon does not cross despite multiple wire crossings and multiple balloon sizes including 1.0.    Renal function did improve and patient went for repeat LHC 4/20:    The pre-procedure left ventricular end diastolic pressure was 5.  The RPDA lesion was 99% stenosed with 10% stenosis post-intervention.  Successful kissing balloon angioplasty of right PDA and distal RCA with 2.5 semi compliant balloons.    Post second cath, renal function continues to improve.  He has been cleared for discharge by cardiology.  He will be started on Brilinta instead of plavix.  Hydralazine and Imdur added.  Rx sent to [facility] outpatient pharmacy to ensure bedside delivery before he leaves.  He will follow up with Dr. Vernon as scheduled. Examined on day of discharge and alert, NAD, comfortable respirations on room air.  He has ambulated around the unit without chest pain.  Return precautions  given.

## 2022-04-18 NOTE — CONSULTS
[facility] - Emergency Dept  Cardiology  Consult Note    Patient Name: Amadeo Levin  MRN: [id number]  Admission Date: 4/18/2022  Hospital Length of Stay: 0 days  Code Status: Full Code   Attending Provider: Marcos Willett MD   Consulting Provider: Dave Vernon MD  Primary Care Physician: Angelito Adams MD  Principal Problem:Acute on chronic combined systolic and diastolic CHF (congestive heart failure)    Patient information was obtained from patient and ER records.     Consults  Subjective:     72 yo male with CAD s/p PCI to RCA/ Right PDA/ Right PL, ICMP EF 45%, unrevascularized mid LAD 75% stenosis admitted with sudden onset shortness of breath last night. No chest pain. Improved with diureisis. Chest xr shows pulm edema. BNP elevated. Feels better with IV diuresis overnight.     Past Medical History:   Diagnosis Date    Coronary artery disease     Diabetes mellitus type II     Hypertension        Past Surgical History:   Procedure Laterality Date    ANGIOGRAM, CORONARY, WITH LEFT HEART CATHETERIZATION N/A 11/27/2021    Procedure: Angiogram, Coronary, with Left Heart Cath;  Surgeon: Dave Vernon MD;  Location: [facility] CATH/EP LAB;  Service: Cardiology;  Laterality: N/A;    APPENDECTOMY      CORONARY ANGIOGRAPHY Left 11/30/2021    Procedure: ANGIOGRAM, CORONARY ARTERY;  Surgeon: Gunnar Vernon MD;  Location: [facility] CATH LAB;  Service: Cardiology;  Laterality: Left;    HERNIA REPAIR      stents         Review of patient's allergies indicates:   Allergen Reactions    Morphine Nausea And Vomiting    Vicodin [hydrocodone-acetaminophen] Nausea And Vomiting       No current facility-administered medications on file prior to encounter.     Current Outpatient Medications on File Prior to Encounter   Medication Sig    alcohol swabs (BD ALCOHOL SWABS) PadM Apply 1 each topically as needed.    amLODIPine (NORVASC) 5 MG tablet Take 1 tablet (5 mg total) by mouth once daily.    aspirin  (ECOTRIN) 81 MG EC tablet Take 1 tablet (81 mg total) by mouth once daily.    atorvastatin (LIPITOR) 80 MG tablet Take 1 tablet (80 mg total) by mouth every evening.    blood sugar diagnostic (ACCU-CHEK OLGA PLUS TEST STRP) Strp 1 each by Misc.(Non-Drug; Combo Route) route daily as needed.    blood sugar diagnostic Strp To check BG 2 times daily, to use with insurance preferred meter    carvediloL (COREG) 12.5 MG tablet Take 1 tablet (12.5 mg total) by mouth 2 (two) times daily with meals.    chlorthalidone (HYGROTEN) 25 MG Tab Take 0.5 tablets (12.5 mg total) by mouth once daily.    clopidogreL (PLAVIX) 75 mg tablet Take 1 tablet (75 mg total) by mouth once daily.    glipiZIDE (GLUCOTROL) 10 MG tablet Take 1 tablet (10 mg total) by mouth 2 (two) times daily with meals.    lancets Misc To check BG 2 times daily, to use with insurance preferred meter    metFORMIN (GLUCOPHAGE) 1000 MG tablet Take 1 tablet (1,000 mg total) by mouth 2 (two) times daily with meals. Please hold medication for 2 more days. Can restart on 12/3/21    MULTIVITAMIN W-MINERALS/LUTEIN (CENTRUM SILVER ORAL) Take 1 capsule by mouth once daily.    SITagliptin (JANUVIA) 100 MG Tab Take 1 tablet (100 mg total) by mouth once daily.     Family History     Problem Relation (Age of Onset)    Diabetes Mother        Tobacco Use    Smoking status: Never Smoker    Smokeless tobacco: Never Used   Substance and Sexual Activity    Alcohol use: Not Currently     Comment: 2 a year    Drug use: No    Sexual activity: Not Currently     Partners: Female     Review of Systems   All other systems reviewed and are negative.    Objective:     Vital Signs (Most Recent):  Temp: 97.9 °F (36.6 °C) (04/18/22 0134)  Pulse: 65 (04/18/22 1115)  Resp: 19 (04/18/22 1115)  BP: (!) 179/76 (04/18/22 1115)  SpO2: 96 % (04/18/22 1115) Vital Signs (24h Range):  Temp:  [97.9 °F (36.6 °C)] 97.9 °F (36.6 °C)  Pulse:  [] 65  Resp:  [15-30] 19  SpO2:  [95 %-100 %] 96  %  BP: (142-214)/(66-95) 179/76     Weight: 98 kg (216 lb)  Body mass index is 31.9 kg/m².    SpO2: 96 %  O2 Device (Oxygen Therapy): BiPAP      Intake/Output Summary (Last 24 hours) at 4/18/2022 1155  Last data filed at 4/18/2022 0700  Gross per 24 hour   Intake 100 ml   Output 1975 ml   Net -1875 ml       Lines/Drains/Airways     Peripheral Intravenous Line  Duration                Peripheral IV - Single Lumen 04/18/22 0140 20 G Anterior;Distal;Left Upper Arm <1 day         Peripheral IV - Single Lumen 04/18/22 0459 18 G Left Hand <1 day                Physical Exam  Vitals reviewed.   Constitutional:       Appearance: Normal appearance. He is obese.   HENT:      Mouth/Throat:      Mouth: Mucous membranes are moist.   Eyes:      Extraocular Movements: Extraocular movements intact.      Pupils: Pupils are equal, round, and reactive to light.   Cardiovascular:      Rate and Rhythm: Normal rate and regular rhythm.      Heart sounds: No murmur heard.    No gallop.   Pulmonary:      Effort: Pulmonary effort is normal.      Breath sounds: Normal breath sounds.   Abdominal:      General: Abdomen is flat.      Palpations: Abdomen is soft.   Musculoskeletal:      Cervical back: Normal range of motion.   Skin:     General: Skin is warm and dry.   Neurological:      General: No focal deficit present.      Mental Status: He is alert and oriented to person, place, and time.   Psychiatric:         Mood and Affect: Mood normal.         Significant Labs:   BMP:   Recent Labs   Lab 04/18/22 0141 04/18/22 0456   * 174*    142   K 6.1* 5.2*    109   CO2 23 22*   BUN 28* 32*   CREATININE 1.9* 2.0*   CALCIUM 8.6* 9.1   MG 1.7  --    , CBC   Recent Labs   Lab 04/18/22 0141   WBC 11.23   HGB 12.6*   HCT 38.2*       and Troponin   Recent Labs   Lab 04/18/22 0141 04/18/22 0456 04/18/22  1113   TROPONINI 0.040 0.720* 10.357*       Significant Imaging: Echocardiogram:   Transthoracic echo (TTE) complete ([facility]  Only):   Results for orders placed or performed during the hospital encounter of 11/26/21   Echo   Result Value Ref Range    BSA 2.19 m2    TDI SEPTAL 0.05 m/s    LV LATERAL E/E' RATIO 8.00 m/s    LV SEPTAL E/E' RATIO 12.80 m/s    AORTIC VALVE CUSP SEPERATION 2.00 cm    TDI LATERAL 0.08 m/s    PV PEAK VELOCITY 119.29 cm/s    LVIDd 5.09 3.5 - 6.0 cm    IVS 1.71 (A) 0.6 - 1.1 cm    Posterior Wall 1.71 (A) 0.6 - 1.1 cm    Ao root annulus 3.08 cm    LVIDs 4.02 (A) 2.1 - 4.0 cm    FS 21 28 - 44 %    LV mass 403.64 g    LA size 4.12 cm    RVDD 206.00 cm    Left Ventricle Relative Wall Thickness 0.67 cm    AV mean gradient 6 mmHg    AV valve area 2.98 cm2    AV Velocity Ratio 75.78     AV index (prosthetic) 0.74     E/A ratio 0.56     Mean e' 0.07 m/s    E wave deceleration time 252.78 msec    IVRT 87.02 msec    LVOT diameter 2.27 cm    LVOT area 4.0 cm2    LVOT peak alhaji 121.24 m/s    LVOT peak VTI 25.43 cm    Ao peak alhaji 1.60 m/s    Ao VTI 34.50 cm    LVOT stroke volume 102.87 cm3    AV peak gradient 10 mmHg    E/E' ratio 9.85 m/s    MV Peak E Alhaji 0.64 m/s    MV Peak A Alhaji 1.14 m/s    LV Mass Index 190 g/m2    Right Atrial Pressure (from IVC) 3 mmHg    EF 45 %    Narrative    · The left ventricle is normal in size with severe concentric hypertrophy   and mildly decreased systolic function.  · The estimated ejection fraction is 45%. There appears to be   inferolateral hypokinesis.  · Indeterminate left ventricular diastolic function.  · Normal right ventricular size with normal right ventricular systolic   function.  · Normal central venous pressure (3 mmHg).        Assessment and Plan:     # NSTEMI  # HFrEF/ flash pulmonary edema  # ICMP  # s/p PCI RCA, needs LAD PCI  # CKD    Plan:  - LHC now  - c/w aspirin, plavix  - c/w diuresis  - start afterload reduction for HF, will use hydrala/ ISDN due to CKD and contrast use  - limited echo    Post cath:  - right radial access  - unchanged left-sided coronary circulation with  severe mid LAD stenosis with ZI 3 flow  - patent proximal and mid to distal RCA stents with severe subtotal greater than 90% ostial InStent restenoses of right PDA stent.  Unable to balloon the stent as balloon does not cross despite multiple wire crossings and multiple balloon sizes including 1.0.  - continue with dual antiplatelet therapy and transferred to [facility] for high-risk PCI.    Continue with medical management and rehydrated.  Stop Lasix.  Will plan on PCI Thursday right femoral access if renal function is stable.    Active Diagnoses:    Diagnosis Date Noted POA    PRINCIPAL PROBLEM:  Acute on chronic combined systolic and diastolic CHF (congestive heart failure) [I50.43] 11/27/2021 Unknown    Acute kidney injury superimposed on chronic kidney disease [N17.9, N18.9] 04/18/2022 Unknown    Chest pain [R07.9] 04/18/2022 Unknown    Hyperkalemia [E87.5] 04/18/2022 Unknown    Pulmonary edema [J81.1] 04/18/2022 Unknown    Troponin I above reference range [R77.8] 04/18/2022 Unknown    Type 2 diabetes mellitus with stage 3b chronic kidney disease, without long-term current use of insulin [E11.22, N18.32] 12/03/2013 Yes      Problems Resolved During this Admission:       VTE Risk Mitigation (From admission, onward)         Ordered     heparin 25,000 units in dextrose 5% (100 units/ml) IV bolus from bag - ADDITIONAL PRN BOLUS - 60 units/kg (max bolus 4000 units)  As needed (PRN)        Question:  Angiotensin II Infusion Adjustment (DO NOT MODIFY ANSWER)  Answer:  [email or web address]\Images\Pharmacy\HeparinInfusions\heparin LOW INTENSITY nomogram for [facility] [id number]    04/18/22 0545     heparin 25,000 units in dextrose 5% (100 units/ml) IV bolus from bag - ADDITIONAL PRN BOLUS - 30 units/kg (max bolus 4000 units)  As needed (PRN)        Question:  Angiotensin II Infusion Adjustment (DO NOT MODIFY ANSWER)  Answer:  \[email or web address]\[email or web address]\Images\Pharmacy\HeparinInfusions\heparin LOW INTENSITY nomogram for [facility]  [id number]    04/18/22 0545     heparin 25,000 units in dextrose 5% 250 mL (100 units/mL) infusion LOW INTENSITY nomogram - [facility]  Continuous        Question Answer Comment   Angiotensin II Infusion Adjustment (DO NOT MODIFY ANSWER) [email or web address]\Images\Pharmacy\HeparinInfusions\heparin LOW INTENSITY nomogram for [facility] [id number]    Begin at (in units/kg/hr) 12        04/18/22 0545     IP VTE HIGH RISK PATIENT  Once         04/18/22 0411     Place sequential compression device  Until discontinued         04/18/22 0411                Thank you for your consult. I will follow-up with patient. Please contact us if you have any additional questions.    Dave Vernon MD  Cardiology   [facility] - Emergency Dept

## 2022-04-18 NOTE — Clinical Note
The catheter was removed from the. The catheter was unable to access the area..Wire in pda came out. Inserted into RPL. runthru then inserted intp pda.

## 2022-04-18 NOTE — PLAN OF CARE
04/18/22 0800   Patient Assessment/Suction   Level of Consciousness (AVPU) responds to voice   Respiratory Effort Normal;Unlabored   Expansion/Accessory Muscles/Retractions no use of accessory muscles   All Lung Fields Breath Sounds Anterior:;diminished;equal bilaterally   Rhythm/Pattern, Respiratory assisted mechanically  (Bi-PAP)   Cough Frequency infrequent   Cough Type no productive sputum   PRE-TX-O2   O2 Device (Oxygen Therapy) BiPAP   $ Is the patient on Low Flow Oxygen? Yes   Oxygen Concentration (%) 30   SpO2 99 %   Pulse Oximetry Type Continuous   $ Pulse Oximetry - Multiple Charge Pulse Oximetry - Multiple   Pulse (!) 55   Resp 17   BP (!) 145/66   Ready to Wean/Extubation Screen   FIO2<=50 (chart decimal) 0.3   Preset CPAP/BiPAP Settings   Mode Of Delivery BiPAP   $ Is patient using? Yes   Size of Mask Large   Sized Appropriately? Yes   Equipment Type V60   Airway Device Type large full face mask   Ipap 12   EPAP (cm H2O) 6   Pressure Support (cm H2O) 6   Set Rate (Breaths/Min) 12   ITime (sec) 1   Rise Time (sec) 3   Patient CPAP/BiPAP Settings   RR Total (Breaths/Min) 16   Tidal Volume (mL) 732   VE Minute Ventilation (L/min) 11.7 L/min   Peak Inspiratory Pressure (cm H2O) 12   TiTOT (%) 30   Total Leak (L/Min) 2   Patient Trigger - ST Mode Only (%) 99   Education   $ Education BiPAP;15 min   Respiratory Evaluation   $ Care Plan Tech Time 15 min   $ Eval/Re-eval Charges Evaluation

## 2022-04-18 NOTE — ED PROVIDER NOTES
Encounter Date: 4/18/2022       History     Chief Complaint   Patient presents with    Chest Pain     Sudden onset 1 hour ago with shoulder pain and shortness of breath. Some relief with nitro     Patient here with reported chest pain onset approximate hour prior to arrival pain radiates into the shoulder and does have shortness of breath he received some relief with nitroglycerin Mr. By EMS EN route to the emergency department patient does have a history of coronary disease diabetes and hypertension he states he has had 5 stents in the past he reports bilateral lower extremity swelling which is chronic he denies any fever chills        Review of patient's allergies indicates:   Allergen Reactions    Morphine Nausea And Vomiting    Vicodin [hydrocodone-acetaminophen] Nausea And Vomiting     Past Medical History:   Diagnosis Date    Coronary artery disease     Diabetes mellitus type II     Hypertension      Past Surgical History:   Procedure Laterality Date    ANGIOGRAM, CORONARY, WITH LEFT HEART CATHETERIZATION N/A 11/27/2021    Procedure: Angiogram, Coronary, with Left Heart Cath;  Surgeon: Dave Vernon MD;  Location: [facility] CATH/EP LAB;  Service: Cardiology;  Laterality: N/A;    APPENDECTOMY      CORONARY ANGIOGRAPHY Left 11/30/2021    Procedure: ANGIOGRAM, CORONARY ARTERY;  Surgeon: Gunnar Vernon MD;  Location: [facility] CATH LAB;  Service: Cardiology;  Laterality: Left;    HERNIA REPAIR      stents       Family History   Problem Relation Age of Onset    Diabetes Mother     Cancer Neg Hx     Macular degeneration Neg Hx     Retinal detachment Neg Hx     Glaucoma Neg Hx      Social History     Tobacco Use    Smoking status: Never Smoker    Smokeless tobacco: Never Used   Substance Use Topics    Alcohol use: Not Currently     Comment: 2 a year    Drug use: No     Review of Systems   Constitutional: Positive for fatigue. Negative for chills and fever.   Respiratory: Positive for cough and  shortness of breath.    Cardiovascular: Positive for chest pain and leg swelling.   Gastrointestinal: Positive for nausea. Negative for abdominal pain.   All other systems reviewed and are negative.      Physical Exam     Initial Vitals [04/18/22 0134]   BP Pulse Resp Temp SpO2   (!) 180/86 94 (!) 30 97.9 °F (36.6 °C) 95 %      MAP       --         Physical Exam    Constitutional: He appears well-developed and well-nourished. No distress.   HENT:   Head: Normocephalic and atraumatic.   Right Ear: External ear normal.   Left Ear: External ear normal.   Mouth/Throat: Oropharynx is clear and moist.   Eyes: Pupils are equal, round, and reactive to light.   Neck: Neck supple.   Normal range of motion.  Cardiovascular: Normal rate, regular rhythm, S1 normal, S2 normal, normal heart sounds and intact distal pulses.   Pulmonary/Chest: Breath sounds normal.   Abdominal: Abdomen is soft. Bowel sounds are normal. There is no abdominal tenderness.   Musculoskeletal:         General: Normal range of motion.      Cervical back: Normal range of motion and neck supple.     Neurological: He is alert and oriented to person, place, and time. He has normal strength. GCS score is 15. GCS eye subscore is 4. GCS verbal subscore is 5. GCS motor subscore is 6.   Skin: Skin is warm and dry. Capillary refill takes less than 2 seconds. No rash noted.   Psychiatric: He has a normal mood and affect. His behavior is normal.         ED Course   Procedures  Labs Reviewed   CBC W/ AUTO DIFFERENTIAL - Abnormal; Notable for the following components:       Result Value    RBC 4.29 (*)     Hemoglobin 12.6 (*)     Hematocrit 38.2 (*)     Gran # (ANC) 9.1 (*)     Gran % 80.5 (*)     Lymph % 10.9 (*)     All other components within normal limits   COMPREHENSIVE METABOLIC PANEL - Abnormal; Notable for the following components:    Potassium 6.1 (*)     Glucose 255 (*)     BUN 28 (*)     Creatinine 1.9 (*)     Calcium 8.6 (*)     Anion Gap 7 (*)     eGFR if   39.6 (*)     eGFR if non  34.2 (*)     All other components within normal limits    Narrative:      k critical result(s) repeated. Called and verbal readback obtained   from tory grimes rn er by MARIANA 04/18/2022 02:20   B-TYPE NATRIURETIC PEPTIDE - Abnormal; Notable for the following components:     (*)     All other components within normal limits   POCT GLUCOSE - Abnormal; Notable for the following components:    POC Glucose 220 (*)     All other components within normal limits   TROPONIN I   MAGNESIUM   SARS-COV-2 RNA AMPLIFICATION, QUAL   POCT GLUCOSE MONITORING CONTINUOUS          Imaging Results          X-Ray Chest AP Portable (In process)                  Medications   aspirin tablet 325 mg (325 mg Oral Not Given 4/18/22 0130)   calcium gluconate 1 g in dextrose 5 % 100 mL IVPB (1 g Intravenous New Bag 4/18/22 0247)   dextrose 50% injection 25 g (has no administration in time range)   dextrose 10% bolus 125 mL (has no administration in time range)   dextrose 10% bolus 250 mL (has no administration in time range)   insulin regular injection 10 Units (has no administration in time range)   sodium bicarbonate 8.4 % (1 mEq/mL) injection 50 mEq (has no administration in time range)   sodium zirconium cyclosilicate packet 5 g (has no administration in time range)   furosemide injection 40 mg (40 mg Intravenous Given 4/18/22 0207)   nitroGLYCERIN 2% TD oint ointment 1 inch (1 inch Topical (Top) Given 4/18/22 0207)   albuterol nebulizer solution 10 mg (10 mg Nebulization Given 4/18/22 0255)     Medical Decision Making:   ED Management:  Patient here with reported chest pain having significant difficulty breathing with evidence of volume overload on radiographs patient received Lasix and nitroglycerin I have placed patient on BiPAP in the emergency department with improvement in his respiratory rate and pain will admit for further cardiac evaluation and treatment                       Clinical Impression:   Final diagnoses:  [R07.9] Chest pain  [I50.43] Acute on chronic combined systolic and diastolic CHF (congestive heart failure)          ED Disposition Condition    Admit               Leonides Smith MD  04/18/22 0117

## 2022-04-18 NOTE — ASSESSMENT & PLAN NOTE
4/18/2022  Mr Levin has a Hx of a NSTEMI with stent 11/21    Pt's creatinine has risen to 10  Taken to the cath lab  PDA lesion could not under PCI  Cardiac cath Thursday with attempted PCI if creatinine stable

## 2022-04-18 NOTE — H&P
[facility] History & Physical Examination   Patient Name: Amadeo Levin  MRN: [id number]  Patient Class: IP- Inpatient   Admission Date: 4/18/2022  1:33 AM  Length of Stay: 0  Attending Physician: Rudy Jaffe MD  Primary Care Provider: Angelito Adams MD  Face-to-Face encounter date: 04/18/2022  Code Status: full code  Chief Complaint: Chest Pain (Sudden onset 1 hour ago with shoulder pain and shortness of breath. Some relief with nitro)          Patient information was obtained from patient, past medical records and ER records.   HISTORY OF PRESENT ILLNESS:   History was obtained from the patient and wife at the bedside and ER physician Sign-out. Patient is a 71 year old male with history of HTN, DM, CAD with multiple cardiac stents who presents to the ED with the complaint of chest pain and shortness of breath that began about an hour prior to arrival in the ED. The patient reports midsternal chest pain with radiation to left arm. SOB is worse with exertion. EMS was called and the patient received Nitro en route with some improvement in his symptoms. The wife reports the patient was in his normal state health on yesterday, and they had dinner at [facility] where the patient had a steak, potato and salad for dinner. He had some SoB when he went to sleep last night and could not get comfortable in bed. He woke up at 1am with sever sob and chest pain.      Patient denies fever, chills, cough, nausea, vomiting, abdominal pain, dysuria, hematuria, diarrhea, melena, numbness, tingling or LOC.     In the ED patient is afebrile. He is hypertensive. He is tachypneic and placed on BiPAP. CBC is unremarkable.   CMP with potassium 6.1, glucose 255, bun 28, creat 1.9  BNP is 136 and Troponin is wnl.    EKG shows NSR with LBBB similar to previous  CXR with evidence of pulmonary edema. The patient is treated with ASA, nitropaste IV lasix and medications to shift potassium.        REVIEW OF SYSTEMS:   10 Point Review of System was performed and was found to be negative except for that mentioned already in the HPI above.     PAST MEDICAL HISTORY:     Past Medical History:   Diagnosis Date    Coronary artery disease     Diabetes mellitus type II     Hypertension        PAST SURGICAL HISTORY:     Past Surgical History:   Procedure Laterality Date    ANGIOGRAM, CORONARY, WITH LEFT HEART CATHETERIZATION N/A 11/27/2021    Procedure: Angiogram, Coronary, with Left Heart Cath;  Surgeon: Dave Vernon MD;  Location: [facility] CATH/EP LAB;  Service: Cardiology;  Laterality: N/A;    APPENDECTOMY      CORONARY ANGIOGRAPHY Left 11/30/2021    Procedure: ANGIOGRAM, CORONARY ARTERY;  Surgeon: Gunnar Vernon MD;  Location: [facility] CATH LAB;  Service: Cardiology;  Laterality: Left;    HERNIA REPAIR      stents         ALLERGIES:   Morphine and Vicodin [hydrocodone-acetaminophen]    FAMILY HISTORY:     Family History   Problem Relation Age of Onset    Diabetes Mother     Cancer Neg Hx     Macular degeneration Neg Hx     Retinal detachment Neg Hx     Glaucoma Neg Hx        SOCIAL HISTORY:     Social History     Tobacco Use    Smoking status: Never Smoker    Smokeless tobacco: Never Used   Substance Use Topics    Alcohol use: Not Currently     Comment: 2 a year        Social History     Substance and Sexual Activity   Sexual Activity Not Currently    Partners: Female        HOME MEDICATIONS:     Prior to Admission medications    Medication Sig Start Date End Date Taking? Authorizing Provider   alcohol swabs (BD ALCOHOL SWABS) PadM Apply 1 each topically as needed. 3/10/15   Fernando Ramírez MD   amLODIPine (NORVASC) 5 MG tablet Take 1 tablet (5 mg total) by mouth once daily. 4/13/22 7/12/22  Angelito Adams MD   aspirin (ECOTRIN) 81 MG EC tablet Take 1 tablet (81 mg total) by mouth once daily. 12/1/21   Mark Walker MD   atorvastatin (LIPITOR) 80 MG tablet Take 1 tablet (80 mg  total) by mouth every evening. 2/21/22   Luca Solitario MD   blood sugar diagnostic (ACCU-CHEK OLGA PLUS TEST STRP) Strp 1 each by Misc.(Non-Drug; Combo Route) route daily as needed. 4/21/15   Fernando Ramírez MD   blood sugar diagnostic Strp To check BG 2 times daily, to use with insurance preferred meter 3/1/21   Kavita Gill NP   carvediloL (COREG) 12.5 MG tablet Take 1 tablet (12.5 mg total) by mouth 2 (two) times daily with meals. 2/22/22   Gunnar Vernon MD   chlorthalidone (HYGROTEN) 25 MG Tab Take 0.5 tablets (12.5 mg total) by mouth once daily. 4/8/22 7/7/22  Angelito Adams MD   clopidogreL (PLAVIX) 75 mg tablet Take 1 tablet (75 mg total) by mouth once daily. 1/6/22 1/6/23  Kavita Gill NP   glipiZIDE (GLUCOTROL) 10 MG tablet Take 1 tablet (10 mg total) by mouth 2 (two) times daily with meals. 4/1/22   Angelito Adams MD   lancets Misc To check BG 2 times daily, to use with insurance preferred meter 4/9/20   Kavita Gill NP   metFORMIN (GLUCOPHAGE) 1000 MG tablet Take 1 tablet (1,000 mg total) by mouth 2 (two) times daily with meals. Please hold medication for 2 more days. Can restart on 12/3/21 4/4/22   Angleito Adams MD   MULTIVITAMIN W-MINERALS/LUTEIN (CENTRUM SILVER ORAL) Take 1 capsule by mouth once daily.    Historical Provider   SITagliptin (JANUVIA) 100 MG Tab Take 1 tablet (100 mg total) by mouth once daily. 12/1/21 12/1/22  Wally Nunez MD         PHYSICAL EXAM:   BP (!) 183/81   Pulse 82   Temp 97.9 °F (36.6 °C)   Resp (!) 27   Wt 98 kg (216 lb)   SpO2 99%   BMI 31.90 kg/m²   Vitals Reviewed  General appearance: Well-developed, overweight, disheveled White male respiratory distress.  Skin: No Rash. Warm, dry.   Neuro: AAox3. Follows commands. Motor and sensory exams grossly intact. Good tone. Power in all 4 extremities 5/5.   HENT: Atraumatic head. Moist mucous membranes of oral cavity.  Eyes: Normal extraocular movements. PERRL  Neck: Supple. No evidence of  lymphadenopathy. No thyroidomegaly.  Lungs: Respiratory distress on BiPAP. Rhonchi bilaterally. No wheezing present.   Heart: Regular rate and rhythm. S1 and S2 present with no murmurs/gallop/rub. No pedal edema. No JVD present.   Abdomen: Soft, non-distended, non-tender. No rebound tenderness/guarding. No masses or organomegaly. Bowel sounds are normal. Bladder is not palpable.   Extremities: capillary refill less than 2 seconds.   Psych/mental status: Alert and oriented. Cooperative. Responds appropriately to questions.   EMERGENCY DEPARTMENT LABS AND IMAGING:     Labs Reviewed   CBC W/ AUTO DIFFERENTIAL - Abnormal; Notable for the following components:       Result Value    RBC 4.29 (*)     Hemoglobin 12.6 (*)     Hematocrit 38.2 (*)     Gran # (ANC) 9.1 (*)     Gran % 80.5 (*)     Lymph % 10.9 (*)     All other components within normal limits   COMPREHENSIVE METABOLIC PANEL - Abnormal; Notable for the following components:    Potassium 6.1 (*)     Glucose 255 (*)     BUN 28 (*)     Creatinine 1.9 (*)     Calcium 8.6 (*)     Anion Gap 7 (*)     eGFR if  39.6 (*)     eGFR if non  34.2 (*)     All other components within normal limits    Narrative:      k critical result(s) repeated. Called and verbal readback obtained   from tory grimes rn er by MARIANA 04/18/2022 02:20   B-TYPE NATRIURETIC PEPTIDE - Abnormal; Notable for the following components:     (*)     All other components within normal limits   POCT GLUCOSE - Abnormal; Notable for the following components:    POC Glucose 220 (*)     All other components within normal limits   TROPONIN I   MAGNESIUM   SARS-COV-2 RNA AMPLIFICATION, QUAL   POCT GLUCOSE MONITORING CONTINUOUS       X-Ray Chest AP Portable    (Results Pending)       ASSESSMENT & PLAN:   Amadeo Levin is a 71 y.o. male admitted for    Active problems/reason for admission  NSTEMI  Acute on Chronic combined diastolic and systolic CHF  EMILEE on  CKD3  Hyperkalemia  Type 2 DM with hyperglycemia  Pulmonary edema      Chronic problems  HTN  CHF  DM        Plan  Admit to Cardio A on bipap  Trend troponin 0.040-->0.720  Start heparin drip per protocol  IV Lasix 40mg q12h  Give Lokelma now  Recheck BMP   Continue ASA/Plavix/BB/Statin  Strict I&O  Fluid restriction   Daily weights  Cardiology consult  Hold metformin/Januvia/glipizide and home diuretics  accuchecks with moderate SSI; hypoglycemia protocol        Diet: NPO except meds    DVT Prophylaxis: Heparin for DVT prophylaxis. Encourage ambulation and OOB as tolerated.     Discharge Planning and Disposition:  Patient will be discharged in 2-3 days  ________________________________________________________________________________    This patient is high risk for life-threatening deterioration and death secondary to above comorbidities and need for IV treatment. This patient meets inpatient criteria.   Face-to-Face encounter date: 04/18/2022  Encounter included review of the medical records, interviewing and examining the patient face-to-face, discussion with family and other health care providers including emergency medicine physician, admission orders, interpreting lab/test results and formulating a plan of care.   Medical Decision Making during this encounter was  [_] Low Complexity  [_] Moderate Complexity  [x] High Complexity  _________________________________________________________________________________    INPATIENT LIST OF MEDICATIONS     Current Facility-Administered Medications:     albuterol nebulizer solution 10 mg, 10 mg, Nebulization, ED 1 Time, Leonides Smith MD    aspirin tablet 325 mg, 325 mg, Oral, ED 1 Time, Leonides Smith MD    calcium gluconate 1 g in dextrose 5 % 100 mL IVPB, 1 g, Intravenous, ED 1 Time, Leonides Smith MD, Last Rate: 100 mL/hr at 04/18/22 0247, 1 g at 04/18/22 0247    dextrose 10% bolus 125 mL, 12.5 g, Intravenous, PRN, Leonides Smith MD    dextrose  10% bolus 250 mL, 25 g, Intravenous, PRN, Leonides Smith MD    dextrose 50% injection 25 g, 25 g, Intravenous, ED 1 Time, Leonides Smith MD    insulin regular injection 10 Units, 10 Units, Intravenous, ED 1 Time, Leonides Smith MD    sodium bicarbonate 8.4 % (1 mEq/mL) injection 50 mEq, 50 mEq, Intravenous, ED 1 Time, Leonides Smith MD    sodium zirconium cyclosilicate packet 5 g, 5 g, Oral, Once, Elli Villa NP    Current Outpatient Medications:     alcohol swabs (BD ALCOHOL SWABS) PadM, Apply 1 each topically as needed., Disp: 100 each, Rfl: 6    amLODIPine (NORVASC) 5 MG tablet, Take 1 tablet (5 mg total) by mouth once daily., Disp: 90 tablet, Rfl: 0    aspirin (ECOTRIN) 81 MG EC tablet, Take 1 tablet (81 mg total) by mouth once daily., Disp: 30 tablet, Rfl: 11    atorvastatin (LIPITOR) 80 MG tablet, Take 1 tablet (80 mg total) by mouth every evening., Disp: 90 tablet, Rfl: 0    blood sugar diagnostic (ACCU-CHEK OLGA PLUS TEST STRP) Strp, 1 each by Misc.(Non-Drug; Combo Route) route daily as needed., Disp: 100 each, Rfl: 6    blood sugar diagnostic Strp, To check BG 2 times daily, to use with insurance preferred meter, Disp: 200 strip, Rfl: 2    carvediloL (COREG) 12.5 MG tablet, Take 1 tablet (12.5 mg total) by mouth 2 (two) times daily with meals., Disp: 180 tablet, Rfl: 3    chlorthalidone (HYGROTEN) 25 MG Tab, Take 0.5 tablets (12.5 mg total) by mouth once daily., Disp: 45 tablet, Rfl: 0    clopidogreL (PLAVIX) 75 mg tablet, Take 1 tablet (75 mg total) by mouth once daily., Disp: 90 tablet, Rfl: 1    glipiZIDE (GLUCOTROL) 10 MG tablet, Take 1 tablet (10 mg total) by mouth 2 (two) times daily with meals., Disp: 180 tablet, Rfl: 1    lancets Misc, To check BG 2 times daily, to use with insurance preferred meter, Disp: 100 each, Rfl: 0    metFORMIN (GLUCOPHAGE) 1000 MG tablet, Take 1 tablet (1,000 mg total) by mouth 2 (two) times daily with meals. Please hold medication  for 2 more days. Can restart on 12/3/21, Disp: 180 tablet, Rfl: 1    MULTIVITAMIN W-MINERALS/LUTEIN (CENTRUM SILVER ORAL), Take 1 capsule by mouth once daily., Disp: , Rfl:     SITagliptin (JANUVIA) 100 MG Tab, Take 1 tablet (100 mg total) by mouth once daily., Disp: 30 tablet, Rfl: 11      Scheduled Meds:   albuterol sulfate  10 mg Nebulization ED 1 Time    aspirin  325 mg Oral ED 1 Time    calcium gluconate IVPB  1 g Intravenous ED 1 Time    dextrose 50%  25 g Intravenous ED 1 Time    insulin regular  10 Units Intravenous ED 1 Time    sodium bicarbonate  50 mEq Intravenous ED 1 Time    sodium zirconium cyclosilicate  5 g Oral Once     Continuous Infusions:  PRN Meds:.      Elli Villa  [facility] Hospitalist  04/18/2022

## 2022-04-18 NOTE — PROGRESS NOTES
[facility]  Progress Note    Patient Name: Amadeo Levin  MRN: [id number]  Patient Class: IP- Inpatient   Admission Date: 4/18/2022  Length of Stay: 0 days  Attending Physician: Marcos Willett MD  Primary Care Provider: Angelito Adams MD        Subjective:     Principal Problem:Acute on chronic combined systolic and diastolic CHF (congestive heart failure)        HPI:  No notes on file    Overview/Hospital Course:  4/18/2022  Mr Childs had severe dyspnea this AM responding to BIPAP and IV furosemide. His troponin went from 0.7 to 10 and he was taken to the cath lab. He has a PDA lesion that could not be stented this date. Transfer to [facility] main-[facility] considered but will not occur at present      Interval History: Ms Levin is feeling better post IV furosemide and BIPAP. Unsuccessful attempt at PDA intervention    Review of Systems   Constitutional:  Positive for activity change, diaphoresis and fatigue.   HENT: Negative.     Eyes: Negative.    Respiratory:  Positive for cough, chest tightness, shortness of breath and wheezing.    Cardiovascular: Negative.    Gastrointestinal:  Positive for abdominal distention. Negative for nausea and vomiting.   Endocrine: Positive for heat intolerance.   Genitourinary:  Positive for decreased urine volume.   Musculoskeletal:  Positive for arthralgias and myalgias.   Skin: Negative.    Allergic/Immunologic: Negative.    Neurological:  Positive for weakness.   Hematological:  Bruises/bleeds easily.   Psychiatric/Behavioral:  The patient is nervous/anxious.    Objective:     Vital Signs (Most Recent):  Temp: 98.3 °F (36.8 °C) (04/18/22 1140)  Pulse: 72 (04/18/22 1520)  Resp: 17 (04/18/22 1520)  BP: (!) 172/73 (04/18/22 1520)  SpO2: 98 % (04/18/22 1520)   Vital Signs (24h Range):  Temp:  [97.9 °F (36.6 °C)-98.3 °F (36.8 °C)] 98.3 °F (36.8 °C)  Pulse:  [] 72  Resp:  [15-30] 17  SpO2:  [95 %-100 %] 98 %  BP: (142-214)/(59-95) 172/73      Weight: 98 kg (216 lb)  Body mass index is 31.9 kg/m².    Intake/Output Summary (Last 24 hours) at 4/18/2022 1532  Last data filed at 4/18/2022 0700  Gross per 24 hour   Intake 100 ml   Output 1975 ml   Net -1875 ml      Physical Exam  Vitals and nursing note reviewed.   Constitutional:       General: He is in acute distress.      Appearance: He is ill-appearing and diaphoretic.   HENT:      Head: Normocephalic and atraumatic.      Nose: Nose normal.      Mouth/Throat:      Mouth: Mucous membranes are moist.   Eyes:      Extraocular Movements: Extraocular movements intact.      Pupils: Pupils are equal, round, and reactive to light.   Neck:      Comments: Use of accessory muscles  Cardiovascular:      Rate and Rhythm: Normal rate and regular rhythm.      Heart sounds:     Gallop present.   Pulmonary:      Breath sounds: Rhonchi and rales present.   Abdominal:      General: There is distension.      Tenderness: There is no abdominal tenderness. There is no guarding or rebound.   Musculoskeletal:         General: Normal range of motion.      Cervical back: Normal range of motion and neck supple.      Right lower leg: Edema present.      Left lower leg: Edema present.   Skin:     General: Skin is warm.   Neurological:      Mental Status: He is alert and oriented to person, place, and time.   Psychiatric:      Comments: Very anxious       Significant Labs: All pertinent labs within the past 24 hours have been reviewed.  Recent Lab Results  (Last 5 results in the past 24 hours)        04/18/22  1314   04/18/22  1215   04/18/22  1115   04/18/22  1113   04/18/22  0456        Anion Gap         11       aPTT     64.7  Comment: Therapeutic Range of 67.5-105.1 secs.  Equivalent to Heparin Concentration of anti-Xa 0.3-0.7 IU/ml.  Result consistent with patient history  patient is on heparin             BUN         32       Calcium         9.1       Chloride         109       CO2         22       Creatinine         2.0        eGFR if          37.2       eGFR if non          32.2  Comment: Calculation used to obtain the estimated glomerular filtration  rate (eGFR) is the CKD-EPI equation.          Glucose         174       POC ACTIVATED CLOTTING TIME K 231               POC Glucose   126             Potassium         5.2       Sample ARTERIAL               Sodium         142       Troponin I       10.357  Comment: troponin   critical result(s) called and verbal readback obtained   from yenni tinoco rn by CAF 04/18/2022 12:09     0.720  Comment: trop critical result(s) repeated. Called and verbal readback   obtained from tory grimes rn er by HBS 04/18/2022 05:39                                Significant Imaging: I have reviewed all pertinent imaging results/findings within the past 24 hours.      Assessment/Plan:      Troponin I above reference range  4/18/2022  Mr Levin has a Hx of a NSTEMI with stent 11/21    Pt's creatinine has risen to 10  Taken to the cath lab  PDA lesion could not under PCI  Cardiac cath Thursday with attempted PCI if creatinine stable        VTE Risk Mitigation (From admission, onward)         Ordered     heparin 25,000 units in dextrose 5% (100 units/ml) IV bolus from bag - ADDITIONAL PRN BOLUS - 60 units/kg (max bolus 4000 units)  As needed (PRN)        Question:  Angiotensin II Infusion Adjustment (DO NOT MODIFY ANSWER)  Answer:  \\[email or web address]\epic\Images\Pharmacy\HeparinInfusions\heparin LOW INTENSITY nomogram for [facility] [id number].pdf    04/18/22 1531     heparin 25,000 units in dextrose 5% (100 units/ml) IV bolus from bag - ADDITIONAL PRN BOLUS - 30 units/kg (max bolus 4000 units)  As needed (PRN)        Question:  Angiotensin II Infusion Adjustment (DO NOT MODIFY ANSWER)  Answer:  \[email or web address]\epic\Images\Pharmacy\HeparinInfusions\heparin LOW INTENSITY nomogram for [facility] [id number].pdf    04/18/22 1531     heparin 25,000 units in dextrose 5% (100 units/ml) IV bolus from bag INITIAL  BOLUS (max bolus 4000 units)  Once        Question:  Angiotensin II Infusion Adjustment (DO NOT MODIFY ANSWER)  Answer:  \\[email or web address]\epic\Images\Pharmacy\HeparinInfusions\heparin LOW INTENSITY nomogram for [facility] [id number].pdf    04/18/22 1531     heparin 25,000 units in dextrose 5% 250 mL (100 units/mL) infusion LOW INTENSITY nomogram - [facility]  Continuous        Question Answer Comment   Angiotensin II Infusion Adjustment (DO NOT MODIFY ANSWER) \\[email or web address]\epic\Images\Pharmacy\HeparinInfusions\heparin LOW INTENSITY nomogram for [facility] [id number].pdf    Begin at (in units/kg/hr) 12        04/18/22 1531     heparin (porcine) injection  As needed (PRN)         04/18/22 1307     heparin 25,000 units in dextrose 5% (100 units/ml) IV bolus from bag - ADDITIONAL PRN BOLUS - 60 units/kg (max bolus 4000 units)  As needed (PRN)        Question:  Angiotensin II Infusion Adjustment (DO NOT MODIFY ANSWER)  Answer:  \\[email or web address]\epic\Images\Pharmacy\HeparinInfusions\heparin LOW INTENSITY nomogram for [facility] [id number].pdf    04/18/22 0545     heparin 25,000 units in dextrose 5% (100 units/ml) IV bolus from bag - ADDITIONAL PRN BOLUS - 30 units/kg (max bolus 4000 units)  As needed (PRN)        Question:  Angiotensin II Infusion Adjustment (DO NOT MODIFY ANSWER)  Answer:  \\[email or web address]\epic\Images\Pharmacy\HeparinInfusions\heparin LOW INTENSITY nomogram for [facility] [id number].pdf    04/18/22 0545     heparin 25,000 units in dextrose 5% 250 mL (100 units/mL) infusion LOW INTENSITY nomogram - [facility]  Continuous        Question Answer Comment   Angiotensin II Infusion Adjustment (DO NOT MODIFY ANSWER) \\[email or web address]\epic\Images\Pharmacy\HeparinInfusions\heparin LOW INTENSITY nomogram for [facility] [id number].pdf    Begin at (in units/kg/hr) 12        04/18/22 0545     IP VTE HIGH RISK PATIENT  Once         04/18/22 0411     Place sequential compression device  Until discontinued         04/18/22 0411                Discharge Planning   DAREN:      Code Status: Full Code   Is  the patient medically ready for discharge?:     Reason for patient still in hospital (select all that apply): Patient unstable, Patient new problem, Treatment and Consult recommendations                     Marcos Willett MD  Department of Hospital Medicine   [facility]

## 2022-04-18 NOTE — Clinical Note
A percutaneous stick to the right femoral artery was performed. A percutaneous stick to the right groin was performed. Ultrasound guidance was used to obtain access.

## 2022-04-19 ENCOUNTER — CLINICAL SUPPORT (OUTPATIENT)
Dept: CARDIOLOGY | Facility: HOSPITAL | Age: 72
DRG: 250 | End: 2022-04-19
Attending: INTERNAL MEDICINE
Payer: MEDICARE

## 2022-04-19 VITALS — WEIGHT: 201.75 LBS | HEIGHT: 69 IN | BODY MASS INDEX: 29.88 KG/M2

## 2022-04-19 LAB
ALBUMIN SERPL BCP-MCNC: 3.6 G/DL (ref 3.5–5.2)
ALP SERPL-CCNC: 67 U/L (ref 55–135)
ALT SERPL W/O P-5'-P-CCNC: 22 U/L (ref 10–44)
ANION GAP SERPL CALC-SCNC: 10 MMOL/L (ref 8–16)
ANION GAP SERPL CALC-SCNC: 10 MMOL/L (ref 8–16)
ANION GAP SERPL CALC-SCNC: 11 MMOL/L (ref 8–16)
APTT PPP: 60.5 SEC (ref 23.3–35.1)
APTT PPP: 64.6 SEC (ref 23.3–35.1)
APTT PPP: 65.7 SEC (ref 23.3–35.1)
APTT PPP: 78.6 SEC (ref 23.3–35.1)
AST SERPL-CCNC: 40 U/L (ref 10–40)
BASOPHILS # BLD AUTO: 0.06 K/UL (ref 0–0.2)
BASOPHILS # BLD AUTO: 0.06 K/UL (ref 0–0.2)
BASOPHILS NFR BLD: 0.8 % (ref 0–1.9)
BASOPHILS NFR BLD: 0.8 % (ref 0–1.9)
BILIRUB SERPL-MCNC: 1.2 MG/DL (ref 0.1–1)
BNP SERPL-MCNC: 280 PG/ML (ref 0–99)
BSA FOR ECHO PROCEDURE: 2.11 M2
BUN SERPL-MCNC: 36 MG/DL (ref 8–23)
BUN SERPL-MCNC: 36 MG/DL (ref 8–23)
BUN SERPL-MCNC: 38 MG/DL (ref 8–23)
CALCIUM SERPL-MCNC: 8.3 MG/DL (ref 8.7–10.5)
CALCIUM SERPL-MCNC: 8.7 MG/DL (ref 8.7–10.5)
CALCIUM SERPL-MCNC: 8.9 MG/DL (ref 8.7–10.5)
CHLORIDE SERPL-SCNC: 105 MMOL/L (ref 95–110)
CHLORIDE SERPL-SCNC: 106 MMOL/L (ref 95–110)
CHLORIDE SERPL-SCNC: 106 MMOL/L (ref 95–110)
CO2 SERPL-SCNC: 21 MMOL/L (ref 23–29)
CO2 SERPL-SCNC: 23 MMOL/L (ref 23–29)
CO2 SERPL-SCNC: 25 MMOL/L (ref 23–29)
CREAT SERPL-MCNC: 1.9 MG/DL (ref 0.5–1.4)
CREAT SERPL-MCNC: 2 MG/DL (ref 0.5–1.4)
CREAT SERPL-MCNC: 2.1 MG/DL (ref 0.5–1.4)
DIFFERENTIAL METHOD: ABNORMAL
DIFFERENTIAL METHOD: ABNORMAL
EJECTION FRACTION: 50 %
EOSINOPHIL # BLD AUTO: 0.2 K/UL (ref 0–0.5)
EOSINOPHIL # BLD AUTO: 0.2 K/UL (ref 0–0.5)
EOSINOPHIL NFR BLD: 2.7 % (ref 0–8)
EOSINOPHIL NFR BLD: 3.1 % (ref 0–8)
ERYTHROCYTE [DISTWIDTH] IN BLOOD BY AUTOMATED COUNT: 14.1 % (ref 11.5–14.5)
ERYTHROCYTE [DISTWIDTH] IN BLOOD BY AUTOMATED COUNT: 14.3 % (ref 11.5–14.5)
EST. GFR  (AFRICAN AMERICAN): 35 ML/MIN/1.73 M^2
EST. GFR  (AFRICAN AMERICAN): 37.2 ML/MIN/1.73 M^2
EST. GFR  (AFRICAN AMERICAN): 39.6 ML/MIN/1.73 M^2
EST. GFR  (NON AFRICAN AMERICAN): 30.3 ML/MIN/1.73 M^2
EST. GFR  (NON AFRICAN AMERICAN): 32.2 ML/MIN/1.73 M^2
EST. GFR  (NON AFRICAN AMERICAN): 34.2 ML/MIN/1.73 M^2
FRACTIONAL SHORTENING: 27 % (ref 28–44)
GLUCOSE SERPL-MCNC: 132 MG/DL (ref 70–110)
GLUCOSE SERPL-MCNC: 140 MG/DL (ref 70–110)
GLUCOSE SERPL-MCNC: 151 MG/DL (ref 70–110)
GLUCOSE SERPL-MCNC: 173 MG/DL (ref 70–110)
GLUCOSE SERPL-MCNC: 198 MG/DL (ref 70–110)
GLUCOSE SERPL-MCNC: 223 MG/DL (ref 70–110)
GLUCOSE SERPL-MCNC: 250 MG/DL (ref 70–110)
HCT VFR BLD AUTO: 33.3 % (ref 40–54)
HCT VFR BLD AUTO: 34.9 % (ref 40–54)
HGB BLD-MCNC: 10.7 G/DL (ref 14–18)
HGB BLD-MCNC: 11.5 G/DL (ref 14–18)
IMM GRANULOCYTES # BLD AUTO: 0.01 K/UL (ref 0–0.04)
IMM GRANULOCYTES # BLD AUTO: 0.03 K/UL (ref 0–0.04)
IMM GRANULOCYTES NFR BLD AUTO: 0.1 % (ref 0–0.5)
IMM GRANULOCYTES NFR BLD AUTO: 0.4 % (ref 0–0.5)
LEFT INTERNAL DIMENSION IN SYSTOLE: 3.88 CM (ref 2.1–4)
LEFT VENTRICLE DIASTOLIC VOLUME INDEX: 40.34 ML/M2
LEFT VENTRICLE DIASTOLIC VOLUME: 83.5 ML
LEFT VENTRICLE SYSTOLIC VOLUME INDEX: 25.1 ML/M2
LEFT VENTRICLE SYSTOLIC VOLUME: 51.9 ML
LEFT VENTRICULAR INTERNAL DIMENSION IN DIASTOLE: 5.29 CM (ref 3.5–6)
LYMPHOCYTES # BLD AUTO: 1.4 K/UL (ref 1–4.8)
LYMPHOCYTES # BLD AUTO: 1.9 K/UL (ref 1–4.8)
LYMPHOCYTES NFR BLD: 18.3 % (ref 18–48)
LYMPHOCYTES NFR BLD: 23.9 % (ref 18–48)
MAGNESIUM SERPL-MCNC: 1.8 MG/DL (ref 1.6–2.6)
MCH RBC QN AUTO: 28.6 PG (ref 27–31)
MCH RBC QN AUTO: 28.6 PG (ref 27–31)
MCHC RBC AUTO-ENTMCNC: 32.1 G/DL (ref 32–36)
MCHC RBC AUTO-ENTMCNC: 33 G/DL (ref 32–36)
MCV RBC AUTO: 87 FL (ref 82–98)
MCV RBC AUTO: 89 FL (ref 82–98)
MONOCYTES # BLD AUTO: 0.7 K/UL (ref 0.3–1)
MONOCYTES # BLD AUTO: 0.7 K/UL (ref 0.3–1)
MONOCYTES NFR BLD: 8.4 % (ref 4–15)
MONOCYTES NFR BLD: 9.5 % (ref 4–15)
NEUTROPHILS # BLD AUTO: 5 K/UL (ref 1.8–7.7)
NEUTROPHILS # BLD AUTO: 5.1 K/UL (ref 1.8–7.7)
NEUTROPHILS NFR BLD: 63.4 % (ref 38–73)
NEUTROPHILS NFR BLD: 68.6 % (ref 38–73)
NRBC BLD-RTO: 0 /100 WBC
NRBC BLD-RTO: 0 /100 WBC
PHOSPHATE SERPL-MCNC: 4.1 MG/DL (ref 2.7–4.5)
PLATELET # BLD AUTO: 101 K/UL (ref 150–450)
PLATELET # BLD AUTO: 106 K/UL (ref 150–450)
PMV BLD AUTO: 11.4 FL (ref 9.2–12.9)
PMV BLD AUTO: 11.7 FL (ref 9.2–12.9)
POTASSIUM SERPL-SCNC: 3.9 MMOL/L (ref 3.5–5.1)
POTASSIUM SERPL-SCNC: 4.1 MMOL/L (ref 3.5–5.1)
POTASSIUM SERPL-SCNC: 4.1 MMOL/L (ref 3.5–5.1)
PROT SERPL-MCNC: 6.4 G/DL (ref 6–8.4)
RBC # BLD AUTO: 3.74 M/UL (ref 4.6–6.2)
RBC # BLD AUTO: 4.02 M/UL (ref 4.6–6.2)
SODIUM SERPL-SCNC: 137 MMOL/L (ref 136–145)
SODIUM SERPL-SCNC: 139 MMOL/L (ref 136–145)
SODIUM SERPL-SCNC: 141 MMOL/L (ref 136–145)
WBC # BLD AUTO: 7.38 K/UL (ref 3.9–12.7)
WBC # BLD AUTO: 7.86 K/UL (ref 3.9–12.7)

## 2022-04-19 PROCEDURE — 36415 COLL VENOUS BLD VENIPUNCTURE: CPT | Performed by: INTERNAL MEDICINE

## 2022-04-19 PROCEDURE — 85730 THROMBOPLASTIN TIME PARTIAL: CPT | Mod: 91 | Performed by: NURSE PRACTITIONER

## 2022-04-19 PROCEDURE — 93306 TTE W/DOPPLER COMPLETE: CPT | Mod: 26,,, | Performed by: INTERNAL MEDICINE

## 2022-04-19 PROCEDURE — 99900035 HC TECH TIME PER 15 MIN (STAT)

## 2022-04-19 PROCEDURE — 63600175 PHARM REV CODE 636 W HCPCS: Performed by: INTERNAL MEDICINE

## 2022-04-19 PROCEDURE — 85025 COMPLETE CBC W/AUTO DIFF WBC: CPT | Performed by: INTERNAL MEDICINE

## 2022-04-19 PROCEDURE — 99232 SBSQ HOSP IP/OBS MODERATE 35: CPT | Mod: 25,,, | Performed by: INTERNAL MEDICINE

## 2022-04-19 PROCEDURE — 83735 ASSAY OF MAGNESIUM: CPT | Performed by: INTERNAL MEDICINE

## 2022-04-19 PROCEDURE — 93306 TTE W/DOPPLER COMPLETE: CPT

## 2022-04-19 PROCEDURE — 94799 UNLISTED PULMONARY SVC/PX: CPT

## 2022-04-19 PROCEDURE — 82962 GLUCOSE BLOOD TEST: CPT

## 2022-04-19 PROCEDURE — 80048 BASIC METABOLIC PNL TOTAL CA: CPT | Mod: 91 | Performed by: NURSE PRACTITIONER

## 2022-04-19 PROCEDURE — 85730 THROMBOPLASTIN TIME PARTIAL: CPT | Performed by: INTERNAL MEDICINE

## 2022-04-19 PROCEDURE — 80048 BASIC METABOLIC PNL TOTAL CA: CPT | Performed by: INTERNAL MEDICINE

## 2022-04-19 PROCEDURE — 84100 ASSAY OF PHOSPHORUS: CPT | Performed by: INTERNAL MEDICINE

## 2022-04-19 PROCEDURE — 94660 CPAP INITIATION&MGMT: CPT

## 2022-04-19 PROCEDURE — 85730 THROMBOPLASTIN TIME PARTIAL: CPT | Mod: 91 | Performed by: INTERNAL MEDICINE

## 2022-04-19 PROCEDURE — 94761 N-INVAS EAR/PLS OXIMETRY MLT: CPT

## 2022-04-19 PROCEDURE — 25000003 PHARM REV CODE 250: Performed by: INTERNAL MEDICINE

## 2022-04-19 PROCEDURE — 80053 COMPREHEN METABOLIC PANEL: CPT | Performed by: INTERNAL MEDICINE

## 2022-04-19 PROCEDURE — 99900031 HC PATIENT EDUCATION (STAT)

## 2022-04-19 PROCEDURE — 85025 COMPLETE CBC W/AUTO DIFF WBC: CPT | Mod: 91 | Performed by: NURSE PRACTITIONER

## 2022-04-19 PROCEDURE — 99232 PR SUBSEQUENT HOSPITAL CARE,LEVL II: ICD-10-PCS | Mod: 25,,, | Performed by: INTERNAL MEDICINE

## 2022-04-19 PROCEDURE — 36415 COLL VENOUS BLD VENIPUNCTURE: CPT | Performed by: NURSE PRACTITIONER

## 2022-04-19 PROCEDURE — 83880 ASSAY OF NATRIURETIC PEPTIDE: CPT | Performed by: NURSE PRACTITIONER

## 2022-04-19 PROCEDURE — 93306 ECHO (CUPID ONLY): ICD-10-PCS | Mod: 26,,, | Performed by: INTERNAL MEDICINE

## 2022-04-19 PROCEDURE — 25000003 PHARM REV CODE 250: Performed by: NURSE PRACTITIONER

## 2022-04-19 PROCEDURE — 21000000 HC CCU ICU ROOM CHARGE

## 2022-04-19 RX ORDER — DIPHENHYDRAMINE HCL 25 MG
50 CAPSULE ORAL
Status: DISCONTINUED | OUTPATIENT
Start: 2022-04-19 | End: 2022-04-20 | Stop reason: HOSPADM

## 2022-04-19 RX ORDER — SODIUM CHLORIDE 9 MG/ML
INJECTION, SOLUTION INTRAVENOUS ONCE
Status: COMPLETED | OUTPATIENT
Start: 2022-04-19 | End: 2022-04-19

## 2022-04-19 RX ORDER — ISOSORBIDE DINITRATE 10 MG/1
40 TABLET ORAL 3 TIMES DAILY
Status: DISCONTINUED | OUTPATIENT
Start: 2022-04-19 | End: 2022-04-21 | Stop reason: HOSPADM

## 2022-04-19 RX ADMIN — ATORVASTATIN CALCIUM 80 MG: 40 TABLET, FILM COATED ORAL at 09:04

## 2022-04-19 RX ADMIN — THERA TABS 1 TABLET: TAB at 08:04

## 2022-04-19 RX ADMIN — INSULIN ASPART 4 UNITS: 100 INJECTION, SOLUTION INTRAVENOUS; SUBCUTANEOUS at 01:04

## 2022-04-19 RX ADMIN — AMLODIPINE BESYLATE 5 MG: 5 TABLET ORAL at 08:04

## 2022-04-19 RX ADMIN — INSULIN ASPART 4 UNITS: 100 INJECTION, SOLUTION INTRAVENOUS; SUBCUTANEOUS at 05:04

## 2022-04-19 RX ADMIN — ISOSORBIDE DINITRATE 40 MG: 10 TABLET ORAL at 02:04

## 2022-04-19 RX ADMIN — CARVEDILOL 12.5 MG: 12.5 TABLET, FILM COATED ORAL at 08:04

## 2022-04-19 RX ADMIN — ASPIRIN 81 MG: 81 TABLET, DELAYED RELEASE ORAL at 08:04

## 2022-04-19 RX ADMIN — ISOSORBIDE DINITRATE 20 MG: 10 TABLET ORAL at 08:04

## 2022-04-19 RX ADMIN — HYDRALAZINE HYDROCHLORIDE 25 MG: 25 TABLET ORAL at 06:04

## 2022-04-19 RX ADMIN — HYDRALAZINE HYDROCHLORIDE 25 MG: 25 TABLET ORAL at 02:04

## 2022-04-19 RX ADMIN — CARVEDILOL 12.5 MG: 12.5 TABLET, FILM COATED ORAL at 05:04

## 2022-04-19 RX ADMIN — HEPARIN SODIUM AND DEXTROSE 14 UNITS/KG/HR: 10000; 5 INJECTION INTRAVENOUS at 09:04

## 2022-04-19 RX ADMIN — SODIUM CHLORIDE: 0.9 INJECTION, SOLUTION INTRAVENOUS at 05:04

## 2022-04-19 RX ADMIN — HEPARIN SODIUM AND DEXTROSE 14 UNITS/KG/HR: 10000; 5 INJECTION INTRAVENOUS at 06:04

## 2022-04-19 RX ADMIN — INSULIN ASPART 1 UNITS: 100 INJECTION, SOLUTION INTRAVENOUS; SUBCUTANEOUS at 09:04

## 2022-04-19 RX ADMIN — MAGNESIUM SULFATE IN WATER 2 G: 40 INJECTION, SOLUTION INTRAVENOUS at 11:04

## 2022-04-19 RX ADMIN — CLOPIDOGREL 75 MG: 75 TABLET, FILM COATED ORAL at 08:04

## 2022-04-19 RX ADMIN — ISOSORBIDE DINITRATE 40 MG: 10 TABLET ORAL at 09:04

## 2022-04-19 RX ADMIN — HYDRALAZINE HYDROCHLORIDE 25 MG: 25 TABLET ORAL at 09:04

## 2022-04-19 NOTE — PLAN OF CARE
Problem: Adult Inpatient Plan of Care  Goal: Plan of Care Review  Outcome: Ongoing, Progressing  Goal: Patient-Specific Goal (Individualized)  Outcome: Ongoing, Progressing     Problem: Diabetes Comorbidity  Goal: Blood Glucose Level Within Targeted Range  Outcome: Ongoing, Progressing     Problem: Fluid and Electrolyte Imbalance (Acute Kidney Injury/Impairment)  Goal: Fluid and Electrolyte Balance  Outcome: Ongoing, Progressing     Problem: Oral Intake Inadequate (Acute Kidney Injury/Impairment)  Goal: Optimal Nutrition Intake  Outcome: Ongoing, Progressing     Problem: Renal Function Impairment (Acute Kidney Injury/Impairment)  Goal: Effective Renal Function  Outcome: Ongoing, Progressing     Problem: Fall Injury Risk  Goal: Absence of Fall and Fall-Related Injury  Outcome: Ongoing, Progressing

## 2022-04-19 NOTE — CARE UPDATE
04/19/22 0800   Patient Assessment/Suction   Level of Consciousness (AVPU) alert   Respiratory Effort Normal;Unlabored   Expansion/Accessory Muscles/Retractions no use of accessory muscles;no retractions;expansion symmetric   Cough Frequency no cough   PRE-TX-O2   O2 Device (Oxygen Therapy) room air   $ Pulse Oximetry - Multiple Charge Pulse Oximetry - Multiple   Skin Integrity   $ Wound Care Tech Time 15 min   Area Observed Bridge of nose;Cheek;Behind ear;Upper lip   Skin Appearance without discoloration   Barrier used? Gel Cushion   Preset CPAP/BiPAP Settings   Mode Of Delivery BiPAP;Standby   $ CPAP/BiPAP Daily Charge BiPAP/CPAP Daily   Size of Mask Large   Sized Appropriately? Yes

## 2022-04-19 NOTE — PLAN OF CARE
Important Message from Medicare was sign, explained and given to patient/caregiver on 04/19/2022 at 9:50am     addressed any questions or concerns.    Important Message from Medicare document will be scanned into patient's medical record

## 2022-04-19 NOTE — CONSULTS
"[facility]  Adult Nutrition   Consult Note (Nutrition Education)     SUMMARY     Recommendations  Recommendation/Intervention:   RD educated patient on CHF and diabetes (A1C of 7.4) verbally and gave written information along with my contact information.    Goals:   Patient to understand the above diet information and be able to implement these basic diet principles into his lifestyle for a healthier outcome.    Nutrition Goal Status: new    Communication of RD Recs: other (comment) (patient)    Dietitian Rounds Brief  Consult for Diet Education: Educated patient on CHF and diabetes (A1C of 7.4) verbally and gave written information along with my contact information. He verbalized understanding and intent to comply. Patient also has good PO intake.    Diet Order:   Current Diet Order: Cardiac     % Intake of Estimated Energy Needs: 75 - 100 %  % Meal Intake: 75 - 100 %    Energy Calories Required: meeting needs  Protein Required: meeting needs  Fluid Required: meeting needs  Tolerance: tolerating    Anthropometrics  Temp: 98.4 °F (36.9 °C)  Height: 5' 9" (175.3 cm)  Height (inches): 69 in  Weight Method: Standard Scale  Weight: 91.5 kg (201 lb 11.5 oz)  Weight (lb): 201.72 lb  Ideal Body Weight (IBW), Male: 160 lb  % Ideal Body Weight, Male (lb): 135 %  BMI (Calculated): 29.8  BMI Grade: 25 - 29.9 - overweight       Estimated/Assessed Needs  Weight Used For Calorie Calculations: 91.5 kg (201 lb 11.5 oz)  Energy Calorie Requirements (kcal): 7723-7686 kcals//day (20-25 kcals/kg ABW)     Protein Requirements: 59-66 g/day (0.8-0.9 g/kg IBW) GFR < 50  Weight Used For Protein Calculations: 73 kg (160 lb 15 oz)  Fluid Requirements (mL): 1 mL/kcal or per MD     RDA Method (mL): 1830       Reason for Assessment  Reason For Assessment: consult  Diagnosis: diabetes diagnosis/complications, renal disease, cardiac disease, other (see comments) (Acute on chronic combined systolic and diastolic CHF (congestive heart " failure))  Relevant Medical History: Type 2 diabetes mellitus with stage 3b chronic kidney disease, without long-term current use of insulin, Acute kidney injury superimposed on chronic kidney disease, Chest pain, Hyperkalemia, Pulmonary edema, Troponin I above reference range  Interdisciplinary Rounds: attended    Nutrition/Diet History  Spiritual, Cultural Beliefs, Taoist Practices, Values that Affect Care: no  Food Allergies: NKFA  Factors Affecting Nutritional Intake: None identified at this time    Weight History:  Wt Readings from Last 5 Encounters:   04/19/22 91.5 kg (201 lb 11.5 oz)   04/19/22 91.5 kg (201 lb 11.5 oz)   04/01/22 98 kg (216 lb 0.8 oz)   01/06/22 101.6 kg (223 lb 15.8 oz)   12/13/21 100.7 kg (222 lb)        Lab/Procedures/Meds: Pertinent Labs/Meds Reviewed    Medications:Pertinent Medications Reviewed    Scheduled Meds:   amLODIPine  5 mg Oral Daily    aspirin  81 mg Oral Daily    atorvastatin  80 mg Oral QHS    carvediloL  12.5 mg Oral BID WM    clopidogreL  75 mg Oral Daily    hydrALAZINE  25 mg Oral Q8H    isosorbide dinitrate  40 mg Oral TID    multivitamin  1 tablet Oral Daily     Continuous Infusions:   heparin (porcine) in D5W 14 Units/kg/hr (04/19/22 0647)     PRN Meds:.acetaminophen, calcium chloride IVPB, calcium chloride IVPB, calcium chloride IVPB, dextrose 10%, dextrose 10%, dextrose 50%, dextrose 50%, glucagon (human recombinant), glucose, glucose, heparin (PORCINE), heparin (PORCINE), hydrALAZINE, insulin aspart U-100, magnesium oxide, magnesium sulfate IVPB, magnesium sulfate IVPB, magnesium sulfate IVPB, magnesium sulfate IVPB, nitroglycerin, potassium chloride in water, potassium chloride in water, potassium chloride in water, potassium chloride in water, potassium chloride, potassium chloride, potassium chloride, potassium chloride, senna-docusate 8.6-50 mg, sodium chloride 0.9%    Labs: Pertinent Labs Reviewed  Clinical Chemistry:  Recent Labs   Lab  04/19/22  0507      K 4.1      CO2 25   *   BUN 36*   CREATININE 2.1*   CALCIUM 8.9   PROT 6.4   ALBUMIN 3.6   BILITOT 1.2*   ALKPHOS 67   AST 40   ALT 22   ANIONGAP 10   ESTGFRAFRICA 35.0*   EGFRNONAA 30.3*   MG 1.8   PHOS 4.1     CBC:   Recent Labs   Lab 04/19/22  0507   WBC 7.38   RBC 4.02*   HGB 11.5*   HCT 34.9*   *   MCV 87   MCH 28.6   MCHC 33.0     Cardiac Profile:  Recent Labs   Lab 04/18/22  0141 04/18/22  0456 04/18/22  1113 04/18/22  1709   *  --   --   --    TROPONINI 0.040 0.720* 10.357* 9.885*     Monitor and Evaluation  Food and Nutrient Intake: food and beverage intake, energy intake  Food and Nutrient Adminstration: diet order  Knowledge/Beliefs/Attitudes: food and nutrition knowledge/skill, beliefs and attitudes  Physical Activity and Function: nutrition-related ADLs and IADLs, factors affecting access to physical activity  Anthropometric Measurements: weight, weight change, body mass index  Biochemical Data, Medical Tests and Procedures: lipid profile, inflammatory profile, glucose/endocrine profile, gastrointestinal profile, electrolyte and renal panel  Nutrition-Focused Physical Findings: overall appearance     Nutrition Risk  Level of Risk/Frequency of Follow-up: low     Nutrition Follow-Up  RD Follow-up?: Yes      Joselin Tirado, ROSINA 04/19/2022 2:46 PM

## 2022-04-19 NOTE — PLAN OF CARE
[facility]  Initial Discharge Assessment       Primary Care Provider: Angelito Adams MD    Admission Diagnosis: Acute on chronic combined systolic and diastolic CHF (congestive heart failure) [I50.43]  Chest pain [R07.9]    Admission Date: 4/18/2022  Expected Discharge Date:     Discharge Barriers Identified: None     Cm completed the assessment with pt at bedside.  Pt lives with spouse and independent in care.  Pt denies POA/LW.  Demographic and PCP is  current on face sheet.  Pt is a diabetic and on Plavix.  Pt denies dme and hh.  Disposition:  Pt will discharge to home. Wife will provide transportation on discharge. No needs verbalized at this time.          Payor: [employer] MANAGED MEDICARE / Plan: [employer] MEDICARE HMO / Product Type: Capitation /     Extended Emergency Contact Information  Primary Emergency Contact: Sima Levin  Address: [facility] [address] [place] [address] [facility]  Home Phone: [phone]  Mobile Phone: [phone]  Relation: Spouse  Preferred language: English   needed? No    Discharge Plan A: Home with family  Discharge Plan B: Home with family      [facility] - [facility] [address] [facility]  [address] [facility] [address]  Phone: [phone] Fax: [phone]    [id number] [place] [address] [address]  [address] [place] [address]  Phone: [phone] Fax: [phone]      Initial Assessment (most recent)     Adult Discharge Assessment - 04/19/22 0925        Discharge Assessment    Assessment Type Discharge Planning Assessment     Confirmed/corrected address, phone number and insurance Yes     Confirmed Demographics Correct on Facesheet     Source of Information patient     Lives With spouse     Facility Arrived From: home     Do you expect to return to your current living situation? Yes     Do you have help at home or someone to help you  manage your care at home? Yes     Who are your caregiver(s) and their phone number(s)? spouse- Sima - [phone]     Prior to hospitilization cognitive status: Alert/Oriented     Current cognitive status: Alert/Oriented     Walking or Climbing Stairs Difficulty none     Dressing/Bathing Difficulty none     Equipment Currently Used at Home glucometer     Readmission within 30 days? No     Patient currently being followed by outpatient case management? Yes     If yes, name of outpatient case management following: insurance company assigned oupatient case management     Do you currently have service(s) that help you manage your care at home? No     Do you take prescription medications? Yes     Do you have prescription coverage? Yes     Do you have any problems affording any of your prescribed medications? No     Is the patient taking medications as prescribed? yes     Who is going to help you get home at discharge? wife     How do you get to doctors appointments? car, drives self     Are you on dialysis? No     Do you take coumadin? No   Plavix    Discharge Plan A Home with family     Discharge Plan B Home with family     DME Needed Upon Discharge  none     Discharge Plan discussed with: Patient     Discharge Barriers Identified None

## 2022-04-19 NOTE — PROGRESS NOTES
[facility]  Department of Cardiology  Progress Note    PATIENT NAME: Amadeo Levin  MRN: [id number]  TODAY'S DATE: 04/19/2022  ADMIT DATE: 4/18/2022    SUBJECTIVE     PRINCIPLE PROBLEM: Acute on chronic combined systolic and diastolic CHF (congestive heart failure)    INTERVAL HISTORY:    4/19/2022      Patient denies CP or SOB  Right wrist soft pulse palpable  Patient had a 9 BEAT run of VTAC this am asymptomatic    Review of patient's allergies indicates:   Allergen Reactions    Morphine Nausea And Vomiting    Vicodin [hydrocodone-acetaminophen] Nausea And Vomiting       REVIEW OF SYSTEMS  CARDIOVASCULAR: No recent chest pain, palpitations, arm, neck, or jaw pain  RESPIRATORY: No recent fever, cough chills, SOB or congestion  : No blood in the urine  GI: No Nausea, vomiting, constipation, diarrhea, blood, or reflux.  MUSCULOSKELETAL: No myalgias  NEURO: No lightheadedness or dizziness  EYES: No Double vision, blurry, vision or headache     OBJECTIVE     VITAL SIGNS (Most Recent)  Temp: 97.4 °F (36.3 °C) (04/19/22 0743)  Pulse: 74 (04/19/22 0743)  Resp: 16 (04/19/22 0743)  BP: (!) 158/71 (04/19/22 0840)  SpO2: 98 % (04/19/22 0743)    VENTILATION STATUS  Resp: 16 (04/19/22 0743)  SpO2: 98 % (04/19/22 0743)       I & O (Last 24H):    Intake/Output Summary (Last 24 hours) at 4/19/2022 1057  Last data filed at 4/19/2022 0848  Gross per 24 hour   Intake 1050 ml   Output 3650 ml   Net -2600 ml       WEIGHTS  Wt Readings from Last 3 Encounters:   04/19/22 0345 91.5 kg (201 lb 11.5 oz)   04/18/22 1140 98 kg (216 lb)   04/18/22 0134 98 kg (216 lb)   04/19/22 0745 91.5 kg (201 lb 11.5 oz)   04/01/22 0850 98 kg (216 lb 0.8 oz)       PHYSICAL EXAM  CONSTITUTIONAL: Well built, well nourished in no apparent distress  NECK: no carotid bruit, no JVD  LUNGS: Diminished bases  CHEST WALL: no tenderness  HEART: regular rate and rhythm, S1, S2 normal, no murmur, click, rub or gallop   ABDOMEN: soft, non-tender; bowel  sounds normal; no masses,  no organomegaly  EXTREMITIES: Extremities normal, no edema- Right wrist soft no hematoma  NEURO: AAO X 3    SCHEDULED MEDS:   amLODIPine  5 mg Oral Daily    aspirin  81 mg Oral Daily    atorvastatin  80 mg Oral QHS    carvediloL  12.5 mg Oral BID WM    clopidogreL  75 mg Oral Daily    hydrALAZINE  25 mg Oral Q8H    isosorbide dinitrate  40 mg Oral TID    multivitamin  1 tablet Oral Daily       CONTINUOUS INFUSIONS:   heparin (porcine) in D5W 14 Units/kg/hr (04/19/22 0647)       PRN MEDS:acetaminophen, calcium chloride IVPB, calcium chloride IVPB, calcium chloride IVPB, dextrose 10%, dextrose 10%, dextrose 50%, dextrose 50%, glucagon (human recombinant), glucose, glucose, heparin (PORCINE), heparin (PORCINE), hydrALAZINE, insulin aspart U-100, magnesium oxide, magnesium sulfate IVPB, magnesium sulfate IVPB, magnesium sulfate IVPB, magnesium sulfate IVPB, nitroglycerin, potassium chloride in water, potassium chloride in water, potassium chloride in water, potassium chloride in water, potassium chloride, potassium chloride, potassium chloride, potassium chloride, senna-docusate 8.6-50 mg, sodium chloride 0.9%    LABS AND DIAGNOSTICS     CBC LAST 3 DAYS  Recent Labs   Lab 04/18/22  0141 04/18/22  1709 04/19/22  0507   WBC 11.23 9.57 7.38   RBC 4.29* 4.32* 4.02*   HGB 12.6* 12.5* 11.5*   HCT 38.2* 37.3* 34.9*   MCV 89 86 87   MCH 29.4 28.9 28.6   MCHC 33.0 33.5 33.0   RDW 14.3 14.0 14.3    91* 106*   MPV 10.6 11.0 11.4   GRAN 80.5*  9.1* 74.6*  7.1 68.6  5.1   LYMPH 10.9*  1.2 16.7*  1.6 18.3  1.4   MONO 4.7  0.5 6.9  0.7 9.5  0.7   BASO 0.11 0.07 0.06   NRBC 0 0 0       COAGULATION LAST 3 DAYS  Recent Labs   Lab 04/18/22  0141 04/18/22  1115 04/18/22  1709 04/18/22 2001 04/19/22  0507   LABPT 15.1*  --  15.5*  --   --    INR 1.3  --  1.3  --   --    APTT 27.8 64.7*  --  28.4 64.6*  60.5*       CHEMISTRY LAST 3 DAYS  Recent Labs   Lab 04/18/22  0141 04/18/22  0456  04/19/22  0507    142 141   K 6.1* 5.2* 4.1    109 106   CO2 23 22* 25   ANIONGAP 7* 11 10   BUN 28* 32* 36*   CREATININE 1.9* 2.0* 2.1*   * 174* 132*   CALCIUM 8.6* 9.1 8.9   MG 1.7  --  1.8   ALBUMIN 4.0  --  3.6   PROT 6.9  --  6.4   ALKPHOS 78  --  67   ALT 24  --  22   AST 21  --  40   BILITOT 0.9  --  1.2*       CARDIAC PROFILE LAST 3 DAYS  Recent Labs   Lab 04/18/22  0141 04/18/22  0456 04/18/22  1113 04/18/22  1709   *  --   --   --    TROPONINI 0.040 0.720* 10.357* 9.885*       ENDOCRINE LAST 3 DAYS  No results for input(s): TSH, PROCAL in the last 168 hours.    LAST ARTERIAL BLOOD GAS  ABG  No results for input(s): PH, PO2, PCO2, HCO3, BE in the last 168 hours.    LAST 7 DAYS MICROBIOLOGY   Microbiology Results (last 7 days)     ** No results found for the last 168 hours. **          MOST RECENT IMAGING  Cardiac catheterization  · The pre-procedure left ventricular end diastolic pressure was 5.  · The RPDA lesion was 99% stenosed.  · Unchanged left coronary artery system with severe mid LAD stenosis which   gives septal collaterals to the right PDA  · Patent proximal and distal RCA stent with subtotal stenosis of ostial   right PDA, InStent.     The procedure log was documented by Documenter: Betito Bryant and verified   by Dave Vernon MD.    Date: 4/18/2022  Time: 2:30 PM  Echo  · The left ventricle is normal in size with low normal systolic function.  · The estimated ejection fraction is 50%.  · Normal left ventricular diastolic function.  · Normal right ventricular size with normal right ventricular systolic   function.         [facility]  Results for orders placed during the hospital encounter of 04/18/22    Echo    Interpretation Summary  · The left ventricle is normal in size with low normal systolic function.  · The estimated ejection fraction is 50%.  · Normal left ventricular diastolic function.  · Normal right ventricular size with normal right ventricular systolic  function.      CURRENT/PREVIOUS VISIT EKG  Results for orders placed or performed during the hospital encounter of 04/18/22   EKG 12-lead    Collection Time: 04/18/22  1:38 AM    Narrative    Test Reason : R07.9,    Vent. Rate : 087 BPM     Atrial Rate : 087 BPM     P-R Int : 160 ms          QRS Dur : 138 ms      QT Int : 416 ms       P-R-T Axes : 063 046 181 degrees     QTc Int : 500 ms    Normal sinus rhythm  Left bundle branch block  Abnormal ECG  When compared with ECG of 30-NOV-2021 17:04,  No significant change was found  Confirmed by Pee MORENO, Shaun DONALDSON ([id number]) on 4/18/2022 4:28:22 PM    Referred By: [email or web address]   SELF           Confirmed By:Shaun Glasgow MD       ASSESSMENT/PLAN:     Active Hospital Problems    Diagnosis    *Acute on chronic combined systolic and diastolic CHF (congestive heart failure)    Acute kidney injury superimposed on chronic kidney disease    Chest pain    Hyperkalemia    Pulmonary edema    Troponin I above reference range    Type 2 diabetes mellitus with stage 3b chronic kidney disease, without long-term current use of insulin       ASSESSMENT & PLAN:     1. NSTEMI  2. EMILEE  3. Pulmonary Edema- improving clinically  4. DM Type 2  5. Hyperkalemia -improved  6. LVEF 50%  7. Hypomag  8. 9 BT VT this AM- Asymptomatic  10. HTN  11. Dyslipidemia      RECOMMENDATIONS:    Replace MAG  Currently on Heparin gtt  Currently on Plavix, ASA  Continue Hydralazine, Amlodipine, Coreg and isoril at present dosing  Creatinine 2.1  Plan for repeat angiogram with PCI once kidney function stabilizes or at least below 1.8.  Consider renal consult        Geovanna Nunez NP  [facility]  Department of Cardiology  Date of Service: 04/19/2022      I have personally interviewed and examined the patient.  I have reviewed all the Nurse Practitioner's documentation, and agree with the plan.       SAM WOODALL M.D.  [facility]  Department of Cardiology  Date of Service: 04/19/2022   10:57 AM

## 2022-04-20 LAB
ALBUMIN SERPL BCP-MCNC: 3.5 G/DL (ref 3.5–5.2)
ALP SERPL-CCNC: 62 U/L (ref 55–135)
ALT SERPL W/O P-5'-P-CCNC: 20 U/L (ref 10–44)
ANION GAP SERPL CALC-SCNC: 9 MMOL/L (ref 8–16)
APTT PPP: 72.4 SEC (ref 23.3–35.1)
APTT PPP: 97.5 SEC (ref 23.3–35.1)
AST SERPL-CCNC: 28 U/L (ref 10–40)
BILIRUB SERPL-MCNC: 0.9 MG/DL (ref 0.1–1)
BUN SERPL-MCNC: 34 MG/DL (ref 8–23)
CALCIUM SERPL-MCNC: 8.3 MG/DL (ref 8.7–10.5)
CHLORIDE SERPL-SCNC: 104 MMOL/L (ref 95–110)
CO2 SERPL-SCNC: 24 MMOL/L (ref 23–29)
CREAT SERPL-MCNC: 1.8 MG/DL (ref 0.5–1.4)
EST. GFR  (AFRICAN AMERICAN): 42.2 ML/MIN/1.73 M^2
EST. GFR  (NON AFRICAN AMERICAN): 36.5 ML/MIN/1.73 M^2
GLUCOSE SERPL-MCNC: 157 MG/DL (ref 70–110)
GLUCOSE SERPL-MCNC: 161 MG/DL (ref 70–110)
GLUCOSE SERPL-MCNC: 177 MG/DL (ref 70–110)
GLUCOSE SERPL-MCNC: 292 MG/DL (ref 70–110)
POC ACTIVATED CLOTTING TIME K: 148 SEC (ref 74–137)
POC ACTIVATED CLOTTING TIME K: 249 SEC (ref 74–137)
POTASSIUM SERPL-SCNC: 4.2 MMOL/L (ref 3.5–5.1)
PROT SERPL-MCNC: 6.3 G/DL (ref 6–8.4)
SAMPLE: ABNORMAL
SAMPLE: ABNORMAL
SODIUM SERPL-SCNC: 137 MMOL/L (ref 136–145)

## 2022-04-20 PROCEDURE — C1769 GUIDE WIRE: HCPCS | Performed by: INTERNAL MEDICINE

## 2022-04-20 PROCEDURE — 93458 L HRT ARTERY/VENTRICLE ANGIO: CPT | Mod: XU | Performed by: INTERNAL MEDICINE

## 2022-04-20 PROCEDURE — 99152 MOD SED SAME PHYS/QHP 5/>YRS: CPT | Mod: ,,, | Performed by: INTERNAL MEDICINE

## 2022-04-20 PROCEDURE — 21000000 HC CCU ICU ROOM CHARGE

## 2022-04-20 PROCEDURE — 99900031 HC PATIENT EDUCATION (STAT)

## 2022-04-20 PROCEDURE — C1725 CATH, TRANSLUMIN NON-LASER: HCPCS | Performed by: INTERNAL MEDICINE

## 2022-04-20 PROCEDURE — 36415 COLL VENOUS BLD VENIPUNCTURE: CPT | Performed by: INTERNAL MEDICINE

## 2022-04-20 PROCEDURE — 93458 PR CATH PLACE/CORON ANGIO, IMG SUPER/INTERP,W LEFT HEART VENTRICULOGRAPHY: ICD-10-PCS | Mod: 26,59,, | Performed by: INTERNAL MEDICINE

## 2022-04-20 PROCEDURE — C1894 INTRO/SHEATH, NON-LASER: HCPCS | Performed by: INTERNAL MEDICINE

## 2022-04-20 PROCEDURE — 92920 PRQ TRLUML C ANGIOP 1ART&/BR: CPT | Mod: RC,,, | Performed by: INTERNAL MEDICINE

## 2022-04-20 PROCEDURE — 85027 COMPLETE CBC AUTOMATED: CPT | Performed by: INTERNAL MEDICINE

## 2022-04-20 PROCEDURE — 25000003 PHARM REV CODE 250: Performed by: INTERNAL MEDICINE

## 2022-04-20 PROCEDURE — 99152 MOD SED SAME PHYS/QHP 5/>YRS: CPT | Performed by: INTERNAL MEDICINE

## 2022-04-20 PROCEDURE — 85730 THROMBOPLASTIN TIME PARTIAL: CPT | Performed by: INTERNAL MEDICINE

## 2022-04-20 PROCEDURE — C1760 CLOSURE DEV, VASC: HCPCS | Performed by: INTERNAL MEDICINE

## 2022-04-20 PROCEDURE — 94660 CPAP INITIATION&MGMT: CPT

## 2022-04-20 PROCEDURE — 82962 GLUCOSE BLOOD TEST: CPT

## 2022-04-20 PROCEDURE — 99900035 HC TECH TIME PER 15 MIN (STAT)

## 2022-04-20 PROCEDURE — 92920 PR PTCA: ICD-10-PCS | Mod: RC,,, | Performed by: INTERNAL MEDICINE

## 2022-04-20 PROCEDURE — 63600175 PHARM REV CODE 636 W HCPCS: Performed by: INTERNAL MEDICINE

## 2022-04-20 PROCEDURE — 93458 L HRT ARTERY/VENTRICLE ANGIO: CPT | Mod: 26,59,, | Performed by: INTERNAL MEDICINE

## 2022-04-20 PROCEDURE — 99152 PR MOD CONSCIOUS SEDATION, SAME PHYS, 5+ YRS, FIRST 15 MIN: ICD-10-PCS | Mod: ,,, | Performed by: INTERNAL MEDICINE

## 2022-04-20 PROCEDURE — 92920 PRQ TRLUML C ANGIOP 1ART&/BR: CPT | Mod: RC | Performed by: INTERNAL MEDICINE

## 2022-04-20 PROCEDURE — 25500020 PHARM REV CODE 255: Performed by: INTERNAL MEDICINE

## 2022-04-20 PROCEDURE — 99153 MOD SED SAME PHYS/QHP EA: CPT | Performed by: INTERNAL MEDICINE

## 2022-04-20 PROCEDURE — 80053 COMPREHEN METABOLIC PANEL: CPT | Performed by: INTERNAL MEDICINE

## 2022-04-20 PROCEDURE — C1887 CATHETER, GUIDING: HCPCS | Performed by: INTERNAL MEDICINE

## 2022-04-20 PROCEDURE — 94799 UNLISTED PULMONARY SVC/PX: CPT

## 2022-04-20 PROCEDURE — 94761 N-INVAS EAR/PLS OXIMETRY MLT: CPT

## 2022-04-20 PROCEDURE — 85730 THROMBOPLASTIN TIME PARTIAL: CPT | Mod: 91 | Performed by: INTERNAL MEDICINE

## 2022-04-20 DEVICE — DEVICE CLOSURE  ANGIO-SEAL 6FR 610130: Type: IMPLANTABLE DEVICE | Site: GROIN | Status: FUNCTIONAL

## 2022-04-20 RX ORDER — IODIXANOL 320 MG/ML
INJECTION, SOLUTION INTRAVASCULAR
Status: DISCONTINUED | OUTPATIENT
Start: 2022-04-20 | End: 2022-04-20 | Stop reason: HOSPADM

## 2022-04-20 RX ORDER — AMLODIPINE BESYLATE 5 MG/1
10 TABLET ORAL DAILY
Status: DISCONTINUED | OUTPATIENT
Start: 2022-04-21 | End: 2022-04-21 | Stop reason: HOSPADM

## 2022-04-20 RX ORDER — HEPARIN SODIUM 10000 [USP'U]/ML
INJECTION, SOLUTION INTRAVENOUS; SUBCUTANEOUS
Status: DISCONTINUED | OUTPATIENT
Start: 2022-04-20 | End: 2022-04-20 | Stop reason: HOSPADM

## 2022-04-20 RX ORDER — MIDAZOLAM HYDROCHLORIDE 1 MG/ML
INJECTION INTRAMUSCULAR; INTRAVENOUS
Status: DISCONTINUED | OUTPATIENT
Start: 2022-04-20 | End: 2022-04-20 | Stop reason: HOSPADM

## 2022-04-20 RX ORDER — LIDOCAINE HYDROCHLORIDE 10 MG/ML
INJECTION, SOLUTION EPIDURAL; INFILTRATION; INTRACAUDAL; PERINEURAL
Status: DISCONTINUED | OUTPATIENT
Start: 2022-04-20 | End: 2022-04-20 | Stop reason: HOSPADM

## 2022-04-20 RX ORDER — FENTANYL CITRATE 50 UG/ML
INJECTION, SOLUTION INTRAMUSCULAR; INTRAVENOUS
Status: DISCONTINUED | OUTPATIENT
Start: 2022-04-20 | End: 2022-04-20 | Stop reason: HOSPADM

## 2022-04-20 RX ADMIN — HYDRALAZINE HYDROCHLORIDE 25 MG: 25 TABLET ORAL at 06:04

## 2022-04-20 RX ADMIN — ATORVASTATIN CALCIUM 80 MG: 40 TABLET, FILM COATED ORAL at 08:04

## 2022-04-20 RX ADMIN — HEPARIN SODIUM AND DEXTROSE 14 UNITS/KG/HR: 10000; 5 INJECTION INTRAVENOUS at 01:04

## 2022-04-20 RX ADMIN — INSULIN ASPART 3 UNITS: 100 INJECTION, SOLUTION INTRAVENOUS; SUBCUTANEOUS at 08:04

## 2022-04-20 RX ADMIN — CLOPIDOGREL 75 MG: 75 TABLET, FILM COATED ORAL at 09:04

## 2022-04-20 RX ADMIN — ISOSORBIDE DINITRATE 40 MG: 10 TABLET ORAL at 08:04

## 2022-04-20 RX ADMIN — HYDRALAZINE HYDROCHLORIDE 25 MG: 25 TABLET ORAL at 08:04

## 2022-04-20 RX ADMIN — ISOSORBIDE DINITRATE 40 MG: 10 TABLET ORAL at 04:04

## 2022-04-20 RX ADMIN — AMLODIPINE BESYLATE 5 MG: 5 TABLET ORAL at 09:04

## 2022-04-20 RX ADMIN — CARVEDILOL 12.5 MG: 12.5 TABLET, FILM COATED ORAL at 04:04

## 2022-04-20 RX ADMIN — ASPIRIN 81 MG: 81 TABLET, DELAYED RELEASE ORAL at 09:04

## 2022-04-20 RX ADMIN — CARVEDILOL 12.5 MG: 12.5 TABLET, FILM COATED ORAL at 08:04

## 2022-04-20 RX ADMIN — ISOSORBIDE DINITRATE 40 MG: 10 TABLET ORAL at 09:04

## 2022-04-20 NOTE — ASSESSMENT & PLAN NOTE
S/p IV lasix  Comforable respirations on room air, presently    Echo    Interpretation Summary  · The left ventricle is normal in size with low normal systolic function.  · The estimated ejection fraction is 50%.  · Normal left ventricular diastolic function.  · Normal right ventricular size with normal right ventricular systolic function.     Recent Labs   Lab 04/19/22  1347   *   .

## 2022-04-20 NOTE — PROGRESS NOTES
[facility] Medicine  Progress Note    Patient Name: Amadeo Levin  MRN: [id number]  Patient Class: IP- Inpatient   Admission Date: 4/18/2022  Length of Stay: 2 days  Attending Physician: Josie Cha MD  Primary Care Provider: Angelito Adams MD        Subjective:     Principal Problem:NSTEMI (non-ST elevation myocardial infarction)        HPI:  No notes on file    Overview/Hospital Course:  4/18/2022  Mr Childs had severe dyspnea this AM responding to BIPAP and IV furosemide. His troponin went from 0.7 to 10 and he was taken to the cath lab. He has a PDA lesion that could not be stented this date. Transfer to [facility] main-Dr Vernon considered but will not occur at present    I, Dr. Cha, have assumed care of patient 4/19.  Patient presented to the ED after waking up with severe SOB and chest pain.  CXR revealed pulmonary edema.  Labs significant for mild EMILEE and K of 6.1 which was shifted.  Troponin initially normal but trended up to 10.  Cardiology consulted.  Patient placed on heparin gtt and given IV lasix.  He was taken for LHC 4/18 with the below results:    · The pre-procedure left ventricular end diastolic pressure was 5.  · The RPDA lesion was 99% stenosed.  · Unchanged left coronary artery system with severe mid LAD stenosis which gives septal collaterals to the right PDA  · patent proximal and mid to distal RCA stents with severe subtotal greater than 90% ostial InStent restenoses of right PDA stent.  Unable to balloon the stent as balloon does not cross despite multiple wire crossings and multiple balloon sizes including 1.0.    Renal function did improve and patient went for repeat LHC 4/20:    · The pre-procedure left ventricular end diastolic pressure was 5.  · The RPDA lesion was 99% stenosed with 10% stenosis post-intervention.  · Successful kissing balloon angioplasty of right PDA and distal RCA with 2.5 semi compliant balloons.          Interval History: Patient  seen prior to [facility]. Other than feeling hungry from being NPO, no current complaints. No chest pain.  No SOB. Remains good saturations on room air.    Review of Systems   Constitutional: Negative.    HENT: Negative.     Eyes: Negative.    Respiratory: Negative.     Cardiovascular: Negative.    Gastrointestinal: Negative.    Endocrine: Negative.    Genitourinary: Negative.    Musculoskeletal: Negative.    Skin: Negative.    Allergic/Immunologic: Negative.    Neurological: Negative.    Hematological: Negative.    Psychiatric/Behavioral: Negative.     Objective:     Vital Signs (Most Recent):  Temp: 98 °F (36.7 °C) (04/20/22 1611)  Pulse: 72 (04/20/22 1611)  Resp: 18 (04/20/22 1611)  BP: (!) 168/79 (04/20/22 1611)  SpO2: 99 % (04/20/22 1611)   Vital Signs (24h Range):  Temp:  [97.8 °F (36.6 °C)-98.7 °F (37.1 °C)] 98 °F (36.7 °C)  Pulse:  [60-86] 72  Resp:  [11-28] 18  SpO2:  [94 %-100 %] 99 %  BP: (132-193)/(60-82) 168/79     Weight: 92 kg (202 lb 13.2 oz)  Body mass index is 29.95 kg/m².    Intake/Output Summary (Last 24 hours) at 4/20/2022 1648  Last data filed at 4/20/2022 1238  Gross per 24 hour   Intake 490 ml   Output 2580 ml   Net -2090 ml      Physical Exam  Vitals and nursing note reviewed.   Constitutional:       General: He is not in acute distress.     Appearance: He is well-developed.   HENT:      Head: Normocephalic and atraumatic.   Eyes:      Pupils: Pupils are equal, round, and reactive to light.   Cardiovascular:      Rate and Rhythm: Regular rhythm.      Heart sounds: No murmur heard.  Pulmonary:      Effort: Pulmonary effort is normal.      Breath sounds: Normal breath sounds. No wheezing.   Abdominal:      General: There is no distension.      Palpations: Abdomen is soft.      Tenderness: There is no abdominal tenderness. There is no guarding or rebound.   Musculoskeletal:         General: Normal range of motion.      Cervical back: Normal range of motion.   Skin:     Findings: No rash.    Neurological:      Mental Status: He is alert and oriented to person, place, and time.      Cranial Nerves: No cranial nerve deficit.      Sensory: No sensory deficit.       Significant Labs: All pertinent labs within the past 24 hours have been reviewed.  CBC:   Recent Labs   Lab 04/18/22  1709 04/19/22  0507 04/19/22 1949   WBC 9.57 7.38 7.86   HGB 12.5* 11.5* 10.7*   HCT 37.3* 34.9* 33.3*   PLT 91* 106* 101*     CMP:   Recent Labs   Lab 04/19/22  0507 04/19/22  1534 04/19/22 1949 04/20/22  0409    139 137 137   K 4.1 4.1 3.9 4.2    105 106 104   CO2 25 23 21* 24   * 198* 173* 157*   BUN 36* 36* 38* 34*   CREATININE 2.1* 2.0* 1.9* 1.8*   CALCIUM 8.9 8.7 8.3* 8.3*   PROT 6.4  --   --  6.3   ALBUMIN 3.6  --   --  3.5   BILITOT 1.2*  --   --  0.9   ALKPHOS 67  --   --  62   AST 40  --   --  28   ALT 22  --   --  20   ANIONGAP 10 11 10 9   EGFRNONAA 30.3* 32.2* 34.2* 36.5*       Significant Imaging: I have reviewed all pertinent imaging results/findings within the past 24 hours.      Assessment/Plan:      * NSTEMI (non-ST elevation myocardial infarction)  S/p [facility] 4/18 with repeat [facility] 4/20 for intervention with results as per Hospital course.  Routine post op care per Cardiology  Seen by Nutrition and discussed low salt, low fat diet    Acute on chronic combined systolic and diastolic CHF (congestive heart failure)  S/p IV lasix  Comforable respirations on room air, presently    Echo    Interpretation Summary  · The left ventricle is normal in size with low normal systolic function.  · The estimated ejection fraction is 50%.  · Normal left ventricular diastolic function.  · Normal right ventricular size with normal right ventricular systolic function.     Recent Labs   Lab 04/19/22  1347   *   .      Acute kidney injury superimposed on chronic kidney disease  Diuretics stopped  Avoiding nephrotoxic meds as able.   Trending labs and improving.  Good UOP.  If worsening, will consult  Nephrology.      Pulmonary edema  Noted on admission CXR.  S/p IV lasix  Good saturations on room air, presently.  Discussed low salt diet, fluid restriction, starting to weigh himself daily.      Hyperkalemia    Resolved.  Following am labs to monitor.    Type 2 diabetes mellitus with stage 3b chronic kidney disease, without long-term current use of insulin  ISS.  Accuchecks.        VTE Risk Mitigation (From admission, onward)         Ordered     heparin 25,000 units in dextrose 5% 250 mL (100 units/mL) infusion LOW INTENSITY nomogram - [facility]  Continuous        Question Answer Comment   Angiotensin II Infusion Adjustment (DO NOT MODIFY ANSWER) \\[email or web address]\epic\Images\Pharmacy\HeparinInfusions\heparin LOW INTENSITY nomogram for [facility] [id number]    Begin at (in units/kg/hr) 12        04/18/22 1701     heparin 25,000 units in dextrose 5% (100 units/ml) IV bolus from bag - ADDITIONAL PRN BOLUS - 60 units/kg (max bolus 4000 units)  As needed (PRN)        Question:  Angiotensin II Infusion Adjustment (DO NOT MODIFY ANSWER)  Answer:  \[email or web address]\epic\Images\Pharmacy\HeparinInfusions\heparin LOW INTENSITY nomogram for [facility] [id number].pdf    04/18/22 0545     heparin 25,000 units in dextrose 5% (100 units/ml) IV bolus from bag - ADDITIONAL PRN BOLUS - 30 units/kg (max bolus 4000 units)  As needed (PRN)        Question:  Angiotensin II Infusion Adjustment (DO NOT MODIFY ANSWER)  Answer:  \[email or web address]\epic\Images\Pharmacy\HeparinInfusions\heparin LOW INTENSITY nomogram for [facility] [id number]    04/18/22 0545     IP VTE HIGH RISK PATIENT  Once         04/18/22 0411     Place sequential compression device  Until discontinued         04/18/22 0411                Discharge Planning   DAREN: 4/21/2022     Code Status: Full Code   Is the patient medically ready for discharge?:     Reason for patient still in hospital (select all that apply): Treatment  Discharge Plan A: Home with family                  Josie Cha MD  Department of  [facility] Medicine   [facility]

## 2022-04-20 NOTE — SUBJECTIVE & OBJECTIVE
Interval History: No present chest pain or SOB. Good UOP.    Review of Systems   Constitutional: Negative.    HENT: Negative.     Eyes: Negative.    Respiratory: Negative.     Cardiovascular: Negative.    Gastrointestinal: Negative.    Endocrine: Negative.    Genitourinary: Negative.    Musculoskeletal: Negative.    Skin: Negative.    Allergic/Immunologic: Negative.    Neurological: Negative.    Hematological: Negative.    Psychiatric/Behavioral: Negative.     Objective:     Vital Signs (Most Recent):  Temp: 98 °F (36.7 °C) (04/19/22 1945)  Pulse: 60 (04/19/22 1945)  Resp: 18 (04/19/22 1945)  BP: 133/63 (04/19/22 1945)  SpO2: 97 % (04/19/22 1956) Vital Signs (24h Range):  Temp:  [97.4 °F (36.3 °C)-98.8 °F (37.1 °C)] 98 °F (36.7 °C)  Pulse:  [58-87] 60  Resp:  [16-22] 18  SpO2:  [95 %-99 %] 97 %  BP: (116-183)/(55-78) 133/63     Weight: 91.5 kg (201 lb 11.5 oz)  Body mass index is 29.79 kg/m².    Intake/Output Summary (Last 24 hours) at 4/19/2022 2122  Last data filed at 4/19/2022 2006  Gross per 24 hour   Intake 1170 ml   Output 1880 ml   Net -710 ml      Physical Exam  Vitals and nursing note reviewed.   Constitutional:       General: He is not in acute distress.     Appearance: He is well-developed.   HENT:      Head: Normocephalic and atraumatic.   Eyes:      Pupils: Pupils are equal, round, and reactive to light.   Cardiovascular:      Rate and Rhythm: Regular rhythm.      Heart sounds: No murmur heard.     Comments: No LE edema  Pulmonary:      Effort: Pulmonary effort is normal.      Breath sounds: Normal breath sounds. No wheezing.      Comments: Comfortable respirations on room air  Abdominal:      General: There is no distension.      Palpations: Abdomen is soft.      Tenderness: There is no abdominal tenderness. There is no guarding or rebound.   Musculoskeletal:         General: Normal range of motion.      Cervical back: Normal range of motion.   Skin:     Findings: No rash.   Neurological:      Mental  Status: He is alert and oriented to person, place, and time.      Cranial Nerves: No cranial nerve deficit.      Sensory: No sensory deficit.       Significant Labs: All pertinent labs within the past 24 hours have been reviewed.  CBC:   Recent Labs   Lab 04/18/22  1709 04/19/22  0507 04/19/22 1949   WBC 9.57 7.38 7.86   HGB 12.5* 11.5* 10.7*   HCT 37.3* 34.9* 33.3*   PLT 91* 106* 101*     CMP:   Recent Labs   Lab 04/18/22  0141 04/18/22  0456 04/19/22  0507 04/19/22  1534 04/19/22 1949      < > 141 139 137   K 6.1*   < > 4.1 4.1 3.9      < > 106 105 106   CO2 23   < > 25 23 21*   *   < > 132* 198* 173*   BUN 28*   < > 36* 36* 38*   CREATININE 1.9*   < > 2.1* 2.0* 1.9*   CALCIUM 8.6*   < > 8.9 8.7 8.3*   PROT 6.9  --  6.4  --   --    ALBUMIN 4.0  --  3.6  --   --    BILITOT 0.9  --  1.2*  --   --    ALKPHOS 78  --  67  --   --    AST 21  --  40  --   --    ALT 24  --  22  --   --    ANIONGAP 7*   < > 10 11 10   EGFRNONAA 34.2*   < > 30.3* 32.2* 34.2*    < > = values in this interval not displayed.     Cardiac Markers:   Recent Labs   Lab 04/19/22  1347   *     Troponin:   Recent Labs   Lab 04/18/22  0456 04/18/22  1113 04/18/22  1709   TROPONINI 0.720* 10.357* 9.885*       Significant Imaging: I have reviewed all pertinent imaging results/findings within the past 24 hours.

## 2022-04-20 NOTE — SUBJECTIVE & OBJECTIVE
Interval History: Patient seen prior to [facility]. Other than feeling hungry from being NPO, no current complaints. No chest pain.  No SOB. Remains good saturations on room air.    Review of Systems   Constitutional: Negative.    HENT: Negative.     Eyes: Negative.    Respiratory: Negative.     Cardiovascular: Negative.    Gastrointestinal: Negative.    Endocrine: Negative.    Genitourinary: Negative.    Musculoskeletal: Negative.    Skin: Negative.    Allergic/Immunologic: Negative.    Neurological: Negative.    Hematological: Negative.    Psychiatric/Behavioral: Negative.     Objective:     Vital Signs (Most Recent):  Temp: 98 °F (36.7 °C) (04/20/22 1611)  Pulse: 72 (04/20/22 1611)  Resp: 18 (04/20/22 1611)  BP: (!) 168/79 (04/20/22 1611)  SpO2: 99 % (04/20/22 1611)   Vital Signs (24h Range):  Temp:  [97.8 °F (36.6 °C)-98.7 °F (37.1 °C)] 98 °F (36.7 °C)  Pulse:  [60-86] 72  Resp:  [11-28] 18  SpO2:  [94 %-100 %] 99 %  BP: (132-193)/(60-82) 168/79     Weight: 92 kg (202 lb 13.2 oz)  Body mass index is 29.95 kg/m².    Intake/Output Summary (Last 24 hours) at 4/20/2022 1648  Last data filed at 4/20/2022 1238  Gross per 24 hour   Intake 490 ml   Output 2580 ml   Net -2090 ml      Physical Exam  Vitals and nursing note reviewed.   Constitutional:       General: He is not in acute distress.     Appearance: He is well-developed.   HENT:      Head: Normocephalic and atraumatic.   Eyes:      Pupils: Pupils are equal, round, and reactive to light.   Cardiovascular:      Rate and Rhythm: Regular rhythm.      Heart sounds: No murmur heard.  Pulmonary:      Effort: Pulmonary effort is normal.      Breath sounds: Normal breath sounds. No wheezing.   Abdominal:      General: There is no distension.      Palpations: Abdomen is soft.      Tenderness: There is no abdominal tenderness. There is no guarding or rebound.   Musculoskeletal:         General: Normal range of motion.      Cervical back: Normal range of motion.   Skin:      Findings: No rash.   Neurological:      Mental Status: He is alert and oriented to person, place, and time.      Cranial Nerves: No cranial nerve deficit.      Sensory: No sensory deficit.       Significant Labs: All pertinent labs within the past 24 hours have been reviewed.  CBC:   Recent Labs   Lab 04/18/22  1709 04/19/22  0507 04/19/22 1949   WBC 9.57 7.38 7.86   HGB 12.5* 11.5* 10.7*   HCT 37.3* 34.9* 33.3*   PLT 91* 106* 101*     CMP:   Recent Labs   Lab 04/19/22  0507 04/19/22  1534 04/19/22 1949 04/20/22  0409    139 137 137   K 4.1 4.1 3.9 4.2    105 106 104   CO2 25 23 21* 24   * 198* 173* 157*   BUN 36* 36* 38* 34*   CREATININE 2.1* 2.0* 1.9* 1.8*   CALCIUM 8.9 8.7 8.3* 8.3*   PROT 6.4  --   --  6.3   ALBUMIN 3.6  --   --  3.5   BILITOT 1.2*  --   --  0.9   ALKPHOS 67  --   --  62   AST 40  --   --  28   ALT 22  --   --  20   ANIONGAP 10 11 10 9   EGFRNONAA 30.3* 32.2* 34.2* 36.5*       Significant Imaging: I have reviewed all pertinent imaging results/findings within the past 24 hours.

## 2022-04-20 NOTE — ASSESSMENT & PLAN NOTE
Diuretics stopped  Avoiding nephrotoxic meds as able.   Trending labs and improving.  Good UOP.  If worsening, will consult Nephrology.

## 2022-04-20 NOTE — PLAN OF CARE
Problem: Adult Inpatient Plan of Care  Goal: Plan of Care Review  Outcome: Ongoing, Progressing     Problem: Diabetes Comorbidity  Goal: Blood Glucose Level Within Targeted Range  Outcome: Ongoing, Progressing     Problem: Renal Function Impairment (Acute Kidney Injury/Impairment)  Goal: Effective Renal Function  Outcome: Ongoing, Progressing

## 2022-04-20 NOTE — ASSESSMENT & PLAN NOTE
Noted on admission CXR.  S/p IV lasix  Good saturations on room air, presently.  Discussed low salt diet, fluid restriction, starting to weigh himself daily.

## 2022-04-20 NOTE — PROGRESS NOTES
[facility] Medicine  Progress Note    Patient Name: Amadeo Levin  MRN: [id number]  Patient Class: IP- Inpatient   Admission Date: 4/18/2022  Length of Stay: 1 days  Attending Physician: Josie Cha MD  Primary Care Provider: Angelito Adams MD        Subjective:     Principal Problem:NSTEMI (non-ST elevation myocardial infarction)        HPI:  No notes on file    Overview/Hospital Course:  4/18/2022  Mr Childs had severe dyspnea this AM responding to BIPAP and IV furosemide. His troponin went from 0.7 to 10 and he was taken to the cath lab. He has a PDA lesion that could not be stented this date. Transfer to [facility] main-Dr Vernon considered but will not occur at present    I, Dr. Cha, have assumed care of patient 4/19.  Patient presented to the ED after waking up with severe SOB and chest pain.  CXR revealed pulmonary edema.  Labs significant for mild EMILEE and K of 6.1 which was shifted.  Troponin initially normal but trended up to 10.  Cardiology consulted.  Patient placed on heparin gtt and given IV lasix.  He was taken for LHC 4/18 with the below results:    · The pre-procedure left ventricular end diastolic pressure was 5.  · The RPDA lesion was 99% stenosed.  · Unchanged left coronary artery system with severe mid LAD stenosis which gives septal collaterals to the right PDA  · patent proximal and mid to distal RCA stents with severe subtotal greater than 90% ostial InStent restenoses of right PDA stent.  Unable to balloon the stent as balloon does not cross despite multiple wire crossings and multiple balloon sizes including 1.0.    Plan for repeat LHC to address PD disease if renal function improving/stable.  Repeat check reveals improving renal function.        Interval History: No present chest pain or SOB. Good UOP.    Review of Systems   Constitutional: Negative.    HENT: Negative.     Eyes: Negative.    Respiratory: Negative.     Cardiovascular: Negative.     Gastrointestinal: Negative.    Endocrine: Negative.    Genitourinary: Negative.    Musculoskeletal: Negative.    Skin: Negative.    Allergic/Immunologic: Negative.    Neurological: Negative.    Hematological: Negative.    Psychiatric/Behavioral: Negative.     Objective:     Vital Signs (Most Recent):  Temp: 98 °F (36.7 °C) (04/19/22 1945)  Pulse: 60 (04/19/22 1945)  Resp: 18 (04/19/22 1945)  BP: 133/63 (04/19/22 1945)  SpO2: 97 % (04/19/22 1956) Vital Signs (24h Range):  Temp:  [97.4 °F (36.3 °C)-98.8 °F (37.1 °C)] 98 °F (36.7 °C)  Pulse:  [58-87] 60  Resp:  [16-22] 18  SpO2:  [95 %-99 %] 97 %  BP: (116-183)/(55-78) 133/63     Weight: 91.5 kg (201 lb 11.5 oz)  Body mass index is 29.79 kg/m².    Intake/Output Summary (Last 24 hours) at 4/19/2022 2122  Last data filed at 4/19/2022 2006  Gross per 24 hour   Intake 1170 ml   Output 1880 ml   Net -710 ml      Physical Exam  Vitals and nursing note reviewed.   Constitutional:       General: He is not in acute distress.     Appearance: He is well-developed.   HENT:      Head: Normocephalic and atraumatic.   Eyes:      Pupils: Pupils are equal, round, and reactive to light.   Cardiovascular:      Rate and Rhythm: Regular rhythm.      Heart sounds: No murmur heard.     Comments: No LE edema  Pulmonary:      Effort: Pulmonary effort is normal.      Breath sounds: Normal breath sounds. No wheezing.      Comments: Comfortable respirations on room air  Abdominal:      General: There is no distension.      Palpations: Abdomen is soft.      Tenderness: There is no abdominal tenderness. There is no guarding or rebound.   Musculoskeletal:         General: Normal range of motion.      Cervical back: Normal range of motion.   Skin:     Findings: No rash.   Neurological:      Mental Status: He is alert and oriented to person, place, and time.      Cranial Nerves: No cranial nerve deficit.      Sensory: No sensory deficit.       Significant Labs: All pertinent labs within the past 24  hours have been reviewed.  CBC:   Recent Labs   Lab 04/18/22  1709 04/19/22  0507 04/19/22  1949   WBC 9.57 7.38 7.86   HGB 12.5* 11.5* 10.7*   HCT 37.3* 34.9* 33.3*   PLT 91* 106* 101*     CMP:   Recent Labs   Lab 04/18/22  0141 04/18/22  0456 04/19/22  0507 04/19/22  1534 04/19/22 1949      < > 141 139 137   K 6.1*   < > 4.1 4.1 3.9      < > 106 105 106   CO2 23   < > 25 23 21*   *   < > 132* 198* 173*   BUN 28*   < > 36* 36* 38*   CREATININE 1.9*   < > 2.1* 2.0* 1.9*   CALCIUM 8.6*   < > 8.9 8.7 8.3*   PROT 6.9  --  6.4  --   --    ALBUMIN 4.0  --  3.6  --   --    BILITOT 0.9  --  1.2*  --   --    ALKPHOS 78  --  67  --   --    AST 21  --  40  --   --    ALT 24  --  22  --   --    ANIONGAP 7*   < > 10 11 10   EGFRNONAA 34.2*   < > 30.3* 32.2* 34.2*    < > = values in this interval not displayed.     Cardiac Markers:   Recent Labs   Lab 04/19/22  1347   *     Troponin:   Recent Labs   Lab 04/18/22  0456 04/18/22  1113 04/18/22  1709   TROPONINI 0.720* 10.357* 9.885*       Significant Imaging: I have reviewed all pertinent imaging results/findings within the past 24 hours.      Assessment/Plan:      * NSTEMI (non-ST elevation myocardial infarction)  S/p LHC 4/19 with results as per Hospital course.  Heparin gtt, medical management  Plan for repeat LHC to address PD disease if renal function improves/stablizes.      Acute on chronic combined systolic and diastolic CHF (congestive heart failure)  S/p IV lasix  Comforable respirations on room air, presently    Echo    Interpretation Summary  · The left ventricle is normal in size with low normal systolic function.  · The estimated ejection fraction is 50%.  · Normal left ventricular diastolic function.  · Normal right ventricular size with normal right ventricular systolic function.     Recent Labs   Lab 04/19/22  1347   *   .      Acute kidney injury superimposed on chronic kidney disease  Diuretics stopped  Avoiding nephrotoxic meds  as able.   Repeat labs ordered this evening and improving.  Good UOP and net negative 5 liters per I/O.    If worsening, will consult Nephrology.      Pulmonary edema  Noted on admission CXR.  S/p IV lasix  Good saturations on room air, presently.      Hyperkalemia    Resolved.  Following am labs to monitor.    Type 2 diabetes mellitus with stage 3b chronic kidney disease, without long-term current use of insulin  ISS.  Accuchecks.        VTE Risk Mitigation (From admission, onward)         Ordered     heparin 25,000 units in dextrose 5% 250 mL (100 units/mL) infusion LOW INTENSITY nomogram - [facility]  Continuous        Question Answer Comment   Angiotensin II Infusion Adjustment (DO NOT MODIFY ANSWER) \\[email or web address]\epic\Images\Pharmacy\HeparinInfusions\heparin LOW INTENSITY nomogram for [facility] [id number].pdf    Begin at (in units/kg/hr) 12        04/18/22 1701     heparin 25,000 units in dextrose 5% (100 units/ml) IV bolus from bag - ADDITIONAL PRN BOLUS - 60 units/kg (max bolus 4000 units)  As needed (PRN)        Question:  Angiotensin II Infusion Adjustment (DO NOT MODIFY ANSWER)  Answer:  \\[email or web address]\epic\Images\Pharmacy\HeparinInfusions\heparin LOW INTENSITY nomogram for [facility] [id number].pdf    04/18/22 0545     heparin 25,000 units in dextrose 5% (100 units/ml) IV bolus from bag - ADDITIONAL PRN BOLUS - 30 units/kg (max bolus 4000 units)  As needed (PRN)        Question:  Angiotensin II Infusion Adjustment (DO NOT MODIFY ANSWER)  Answer:  \\[email or web address]\epic\Images\Pharmacy\HeparinInfusions\heparin LOW INTENSITY nomogram for [facility] [id number].pdf    04/18/22 0545     IP VTE HIGH RISK PATIENT  Once         04/18/22 0411     Place sequential compression device  Until discontinued         04/18/22 0411                Discharge Planning   DAREN:      Code Status: Full Code   Is the patient medically ready for discharge?:     Reason for patient still in hospital (select all that apply): Treatment  Discharge Plan A: Home with family                   Josie Cha MD  Department of Hospital Medicine   [facility]

## 2022-04-20 NOTE — PLAN OF CARE
04/20/22 0801   Patient Assessment/Suction   Level of Consciousness (AVPU) alert   Respiratory Effort Normal;Unlabored   PRE-TX-O2   O2 Device (Oxygen Therapy) room air   SpO2 97 %   Pulse Oximetry Type Continuous   $ Pulse Oximetry - Multiple Charge Pulse Oximetry - Multiple   Pulse 86   Resp (!) 28   Skin Integrity   $ Wound Care Tech Time 15 min   Area Observed Bridge of nose   Skin Appearance without discoloration   Preset CPAP/BiPAP Settings   Mode Of Delivery Standby   $ CPAP/BiPAP Daily Charge BiPAP/CPAP Daily   $ Initial CPAP/BiPAP Setup? No   Equipment Type V60   Ipap 12   EPAP (cm H2O) 6   Pressure Support (cm H2O) 6   Set Rate (Breaths/Min) 12   ITime (sec) 1   Rise Time (sec) 3   CPAP/BiPAP Alarms   High Pressure (cm H2O) 50   Low Pressure (cm H2O) 10   Minute Ventilation (L/Min) 3   High RR (breaths/min) 50   Low RR (breaths/min) 10   Apnea (Sec) 20   Education   $ Education Bronchodilator;BiPAP;15 min   Respiratory Evaluation   $ Care Plan Tech Time 15 min

## 2022-04-20 NOTE — ASSESSMENT & PLAN NOTE
S/p C 4/18 with repeat [facility] 4/20 for intervention with results as per Hospital course.  Routine post op care per Cardiology  Seen by Nutrition and discussed low salt, low fat diet

## 2022-04-20 NOTE — CARE UPDATE
04/19/22 1956   Patient Assessment/Suction   Level of Consciousness (AVPU) alert   Respiratory Effort Normal;Unlabored   Expansion/Accessory Muscles/Retractions no retractions;no use of accessory muscles;expansion symmetric   All Lung Fields Breath Sounds Anterior:;clear;equal bilaterally   PRE-TX-O2   O2 Device (Oxygen Therapy) room air   SpO2 97 %   Pulse Oximetry Type Continuous   $ Pulse Oximetry - Multiple Charge Pulse Oximetry - Multiple   Education   $ Education Bronchodilator;15 min   Respiratory Evaluation   $ Care Plan Tech Time 15 min   $ Eval/Re-eval Charges Re-evaluation

## 2022-04-20 NOTE — ASSESSMENT & PLAN NOTE
S/p LHC 4/19 with results as per Hospital course.  Heparin gtt, medical management  Plan for repeat LHC to address PD disease if renal function improves/stablizes.

## 2022-04-20 NOTE — ASSESSMENT & PLAN NOTE
Diuretics stopped  Avoiding nephrotoxic meds as able.   Repeat labs ordered this evening and improving.  Good UOP and net negative 5 liters per I/O.    If worsening, will consult Nephrology.

## 2022-04-21 VITALS
SYSTOLIC BLOOD PRESSURE: 135 MMHG | BODY MASS INDEX: 30.44 KG/M2 | OXYGEN SATURATION: 98 % | WEIGHT: 205.5 LBS | DIASTOLIC BLOOD PRESSURE: 60 MMHG | TEMPERATURE: 98 F | HEART RATE: 70 BPM | HEIGHT: 69 IN | RESPIRATION RATE: 18 BRPM

## 2022-04-21 PROBLEM — J81.1 PULMONARY EDEMA: Status: RESOLVED | Noted: 2022-04-18 | Resolved: 2022-04-21

## 2022-04-21 PROBLEM — N18.9 ACUTE KIDNEY INJURY SUPERIMPOSED ON CHRONIC KIDNEY DISEASE: Status: RESOLVED | Noted: 2022-04-18 | Resolved: 2022-04-21

## 2022-04-21 PROBLEM — E87.5 HYPERKALEMIA: Status: RESOLVED | Noted: 2022-04-18 | Resolved: 2022-04-21

## 2022-04-21 PROBLEM — N17.9 ACUTE KIDNEY INJURY SUPERIMPOSED ON CHRONIC KIDNEY DISEASE: Status: RESOLVED | Noted: 2022-04-18 | Resolved: 2022-04-21

## 2022-04-21 PROBLEM — I50.43 ACUTE ON CHRONIC COMBINED SYSTOLIC AND DIASTOLIC CHF (CONGESTIVE HEART FAILURE): Status: RESOLVED | Noted: 2021-11-27 | Resolved: 2022-04-21

## 2022-04-21 LAB
ANION GAP SERPL CALC-SCNC: 7 MMOL/L (ref 8–16)
BUN SERPL-MCNC: 30 MG/DL (ref 8–23)
CALCIUM SERPL-MCNC: 8.7 MG/DL (ref 8.7–10.5)
CHLORIDE SERPL-SCNC: 109 MMOL/L (ref 95–110)
CO2 SERPL-SCNC: 23 MMOL/L (ref 23–29)
CREAT SERPL-MCNC: 1.7 MG/DL (ref 0.5–1.4)
EST. GFR  (AFRICAN AMERICAN): 45.2 ML/MIN/1.73 M^2
EST. GFR  (NON AFRICAN AMERICAN): 39.1 ML/MIN/1.73 M^2
GLUCOSE SERPL-MCNC: 157 MG/DL (ref 70–110)
GLUCOSE SERPL-MCNC: 163 MG/DL (ref 70–110)
MAGNESIUM SERPL-MCNC: 2.3 MG/DL (ref 1.6–2.6)
POTASSIUM SERPL-SCNC: 4 MMOL/L (ref 3.5–5.1)
SODIUM SERPL-SCNC: 139 MMOL/L (ref 136–145)

## 2022-04-21 PROCEDURE — 99900031 HC PATIENT EDUCATION (STAT)

## 2022-04-21 PROCEDURE — 25000003 PHARM REV CODE 250: Performed by: INTERNAL MEDICINE

## 2022-04-21 PROCEDURE — 36415 COLL VENOUS BLD VENIPUNCTURE: CPT | Performed by: INTERNAL MEDICINE

## 2022-04-21 PROCEDURE — 99232 SBSQ HOSP IP/OBS MODERATE 35: CPT | Mod: ,,, | Performed by: INTERNAL MEDICINE

## 2022-04-21 PROCEDURE — 99900035 HC TECH TIME PER 15 MIN (STAT)

## 2022-04-21 PROCEDURE — 99232 PR SUBSEQUENT HOSPITAL CARE,LEVL II: ICD-10-PCS | Mod: ,,, | Performed by: INTERNAL MEDICINE

## 2022-04-21 PROCEDURE — 80048 BASIC METABOLIC PNL TOTAL CA: CPT | Performed by: INTERNAL MEDICINE

## 2022-04-21 PROCEDURE — 94761 N-INVAS EAR/PLS OXIMETRY MLT: CPT

## 2022-04-21 PROCEDURE — 83735 ASSAY OF MAGNESIUM: CPT | Performed by: INTERNAL MEDICINE

## 2022-04-21 PROCEDURE — 94660 CPAP INITIATION&MGMT: CPT

## 2022-04-21 RX ORDER — ISOSORBIDE DINITRATE 20 MG/1
40 TABLET ORAL 3 TIMES DAILY
Qty: 180 TABLET | Refills: 1 | Status: ON HOLD | OUTPATIENT
Start: 2022-04-21 | End: 2022-06-24 | Stop reason: SDUPTHER

## 2022-04-21 RX ORDER — HYDRALAZINE HYDROCHLORIDE 25 MG/1
25 TABLET, FILM COATED ORAL EVERY 8 HOURS
Qty: 90 TABLET | Refills: 1 | Status: ON HOLD | OUTPATIENT
Start: 2022-04-21 | End: 2022-06-17 | Stop reason: HOSPADM

## 2022-04-21 RX ADMIN — CARVEDILOL 12.5 MG: 12.5 TABLET, FILM COATED ORAL at 06:04

## 2022-04-21 RX ADMIN — TICAGRELOR 90 MG: 90 TABLET ORAL at 08:04

## 2022-04-21 RX ADMIN — AMLODIPINE BESYLATE 10 MG: 5 TABLET ORAL at 08:04

## 2022-04-21 RX ADMIN — ISOSORBIDE DINITRATE 40 MG: 10 TABLET ORAL at 08:04

## 2022-04-21 RX ADMIN — HYDRALAZINE HYDROCHLORIDE 25 MG: 25 TABLET ORAL at 06:04

## 2022-04-21 RX ADMIN — ASPIRIN 81 MG: 81 TABLET, DELAYED RELEASE ORAL at 08:04

## 2022-04-21 RX ADMIN — THERA TABS 1 TABLET: TAB at 08:04

## 2022-04-21 NOTE — DISCHARGE SUMMARY
formerly [facility] Medicine  Discharge Summary      Patient Name: Amadeo Levin  MRN: [id number]  Patient Class: IP- Inpatient  Admission Date: 4/18/2022  Hospital Length of Stay: 3 days  Discharge Date and Time: 4/21/2022 11:30 AM  Attending Physician: No att. providers found   Discharging Provider: Josie Cha MD  Primary Care Provider: Angelito Adams MD      HPI:   No notes on file    Procedure(s) (LRB):  Left heart cath (Left)  PTA (ANGIOPLASTY, PERCUTANEOUS, TRANSLUMINAL) (N/A)      Hospital Course:   4/18/2022  Mr Childs had severe dyspnea this AM responding to BIPAP and IV furosemide. His troponin went from 0.7 to 10 and he was taken to the cath lab. He has a PDA lesion that could not be stented this date. Transfer to [facility] main-Dr Vernon considered but will not occur at present    I, Dr. Cha, have assumed care of patient 4/19.  Patient presented to the ED after waking up with severe SOB and chest pain.  CXR revealed pulmonary edema.  Labs significant for mild EMILEE and K of 6.1 which was shifted.  Troponin initially normal but trended up to 10.  Cardiology consulted.  Patient placed on heparin gtt and given IV lasix.  He was taken for LHC 4/18 with the below results:    · The pre-procedure left ventricular end diastolic pressure was 5.  · The RPDA lesion was 99% stenosed.  · Unchanged left coronary artery system with severe mid LAD stenosis which gives septal collaterals to the right PDA  · patent proximal and mid to distal RCA stents with severe subtotal greater than 90% ostial InStent restenoses of right PDA stent.  Unable to balloon the stent as balloon does not cross despite multiple wire crossings and multiple balloon sizes including 1.0.    Renal function did improve and patient went for repeat LHC 4/20:    · The pre-procedure left ventricular end diastolic pressure was 5.  · The RPDA lesion was 99% stenosed with 10% stenosis post-intervention.  · Successful kissing balloon  angioplasty of right PDA and distal RCA with 2.5 semi compliant balloons.    Post second cath, renal function continues to improve.  He has been cleared for discharge by cardiology.  He will be started on Brilinta instead of plavix.  Hydralazine and Imdur added.  Rx sent to [facility] outpatient pharmacy to ensure bedside delivery before he leaves.  He will follow up with Dr. Vernon as scheduled. Examined on day of discharge and alert, NAD, comfortable respirations on room air.  He has ambulated around the unit without chest pain.  Return precautions given.       Goals of Care Treatment Preferences:  Code Status: Full Code      Consults:   Consults (From admission, onward)        Status Ordering Provider     Inpatient consult to Registered Dietitian/Nutritionist  Once        Provider:  (Not yet assigned)    JESSIKA Che          No new Assessment & Plan notes have been filed under this hospital service since the last note was generated.  Service: Hospital Medicine    Final Active Diagnoses:    Diagnosis Date Noted POA    PRINCIPAL PROBLEM:  NSTEMI (non-ST elevation myocardial infarction) [I21.4] 11/28/2021 Yes    Type 2 diabetes mellitus with stage 3b chronic kidney disease, without long-term current use of insulin [E11.22, N18.32] 12/03/2013 Yes      Problems Resolved During this Admission:    Diagnosis Date Noted Date Resolved POA    Acute on chronic combined systolic and diastolic CHF (congestive heart failure) [I50.43] 11/27/2021 04/21/2022 Yes    Acute kidney injury superimposed on chronic kidney disease [N17.9, N18.9] 04/18/2022 04/21/2022 Yes    Hyperkalemia [E87.5] 04/18/2022 04/21/2022 Yes    Pulmonary edema [J81.1] 04/18/2022 04/21/2022 Yes       Discharged Condition: good    Disposition: Home or Self Care    Follow Up:   Follow-up Information     Dr. Vernon Follow up.    Why: Please keep May 5th appointment                     Patient Instructions:      Diet diabetic     Diet Cardiac     Notify  your health care provider if you experience any of the following:  severe uncontrolled pain     Notify your health care provider if you experience any of the following:  difficulty breathing or increased cough     Activity as tolerated       Significant Diagnostic Studies: Labs:   CMP   Recent Labs   Lab 04/19/22 1949 04/20/22 0409 04/21/22  0331    137 139   K 3.9 4.2 4.0    104 109   CO2 21* 24 23   * 157* 163*   BUN 38* 34* 30*   CREATININE 1.9* 1.8* 1.7*   CALCIUM 8.3* 8.3* 8.7   PROT  --  6.3  --    ALBUMIN  --  3.5  --    BILITOT  --  0.9  --    ALKPHOS  --  62  --    AST  --  28  --    ALT  --  20  --    ANIONGAP 10 9 7*   ESTGFRAFRICA 39.6* 42.2* 45.2*   EGFRNONAA 34.2* 36.5* 39.1*    and CBC   Recent Labs   Lab 04/19/22 1949   WBC 7.86   HGB 10.7*   HCT 33.3*   *       Pending Diagnostic Studies:     Procedure Component Value Units Date/Time    APTT [[id number]] Collected: 04/20/22 0409    Order Status: Sent Lab Status: In process Updated: 04/20/22 0410    Specimen: Blood     APTT [[id number]] Collected: 04/19/22 1949    Order Status: Sent Lab Status: In process Updated: 04/19/22 1949    Specimen: Blood     CBC Without Differential [[id number]] Collected: 04/20/22 0409    Order Status: Sent Lab Status: In process Updated: 04/20/22 0417    Specimen: Blood     EKG 12-LEAD on arrival to floor [[id number]]     Order Status: Sent Lab Status: No result     EKG 12-LEAD starting tomorrow [[id number]]     Order Status: Sent Lab Status: No result     EKG 12-lead [[id number]]     Order Status: Sent Lab Status: No result     EKG 12-lead [[id number]]     Order Status: Sent Lab Status: No result          Medications:  Reconciled Home Medications:      Medication List      START taking these medications    BRILINTA 90 mg tablet  Generic drug: ticagrelor  Take 1 tablet (90 mg total) by mouth 2 (two) times a day.     hydrALAZINE 25 MG tablet  Commonly known as: APRESOLINE  Take 1 tablet (25 mg total)  by mouth every 8 (eight) hours.     isosorbide dinitrate 20 MG tablet  Commonly known as: ISORDIL  Take 2 tablets (40 mg total) by mouth 3 (three) times daily.        CONTINUE taking these medications    alcohol swabs Padm  Commonly known as: BD ALCOHOL SWABS  Apply 1 each topically as needed.     amLODIPine 5 MG tablet  Commonly known as: NORVASC  Take 1 tablet (5 mg total) by mouth once daily.     aspirin 81 MG EC tablet  Commonly known as: ECOTRIN  Take 1 tablet (81 mg total) by mouth once daily.     atorvastatin 80 MG tablet  Commonly known as: LIPITOR  Take 1 tablet (80 mg total) by mouth every evening.     * blood sugar diagnostic Strp  Commonly known as: ACCU-CHEK OLGA PLUS TEST STRP  1 each by Misc.(Non-Drug; Combo Route) route daily as needed.     * blood sugar diagnostic Strp  To check BG 2 times daily, to use with insurance preferred meter     carvediloL 12.5 MG tablet  Commonly known as: COREG  Take 1 tablet (12.5 mg total) by mouth 2 (two) times daily with meals.     CENTRUM SILVER ORAL  Take 1 capsule by mouth once daily.     chlorthalidone 25 MG Tab  Commonly known as: HYGROTEN  Take 0.5 tablets (12.5 mg total) by mouth once daily.     glipiZIDE 10 MG tablet  Commonly known as: GLUCOTROL  Take 1 tablet (10 mg total) by mouth 2 (two) times daily with meals.     lancets Misc  To check BG 2 times daily, to use with insurance preferred meter     metFORMIN 1000 MG tablet  Commonly known as: GLUCOPHAGE  Take 1 tablet (1,000 mg total) by mouth 2 (two) times daily with meals. Please hold medication for 2 more days. Can restart on 12/3/21     SITagliptin 100 MG Tab  Commonly known as: JANUVIA  Take 1 tablet (100 mg total) by mouth once daily.         * This list has 2 medication(s) that are the same as other medications prescribed for you. Read the directions carefully, and ask your doctor or other care provider to review them with you.            STOP taking these medications    clopidogreL 75 mg  tablet  Commonly known as: PLAVIX            Indwelling Lines/Drains at time of discharge:   Lines/Drains/Airways     None                 Time spent on the discharge of patient: 35 minutes         Josie Cha MD  Department of Hospital Medicine  [facility]

## 2022-04-21 NOTE — PROGRESS NOTES
[facility]  Department of Cardiology  Progress Note    PATIENT NAME: Amadeo Levin  MRN: [id number]  TODAY'S DATE: 04/21/2022  ADMIT DATE: 4/18/2022    SUBJECTIVE     PRINCIPLE PROBLEM: NSTEMI (non-ST elevation myocardial infarction)    INTERVAL HISTORY:    4/21/2022       Patient denies CP.   NO SOB  Right groin soft no hematoma.    4/19  Patient denies CP or SOB  Right wrist soft pulse palpable  Patient had a 9 BEAT run of VTAC this am asymptomatic    Review of patient's allergies indicates:   Allergen Reactions    Morphine Nausea And Vomiting    Vicodin [hydrocodone-acetaminophen] Nausea And Vomiting       REVIEW OF SYSTEMS  CARDIOVASCULAR: No recent chest pain, palpitations, arm, neck, or jaw pain  RESPIRATORY: No recent fever, cough chills, SOB or congestion  : No blood in the urine  GI: No Nausea, vomiting, constipation, diarrhea, blood, or reflux.  MUSCULOSKELETAL: No myalgias  NEURO: No lightheadedness or dizziness  EYES: No Double vision, blurry, vision or headache     OBJECTIVE     VITAL SIGNS (Most Recent)  Temp: 98 °F (36.7 °C) (04/21/22 0744)  Pulse: 71 (04/21/22 0744)  Resp: 18 (04/21/22 0744)  BP: 135/60 (04/21/22 0744)  SpO2: 98 % (04/21/22 0744)    VENTILATION STATUS  Resp: 18 (04/21/22 0744)  SpO2: 98 % (04/21/22 0744)       I & O (Last 24H):    Intake/Output Summary (Last 24 hours) at 4/21/2022 1048  Last data filed at 4/21/2022 0908  Gross per 24 hour   Intake 1110 ml   Output 1200 ml   Net -90 ml       WEIGHTS  Wt Readings from Last 3 Encounters:   04/21/22 0500 93.2 kg (205 lb 7.5 oz)   04/20/22 0332 92 kg (202 lb 13.2 oz)   04/19/22 0345 91.5 kg (201 lb 11.5 oz)   04/18/22 1140 98 kg (216 lb)   04/18/22 0134 98 kg (216 lb)   04/19/22 0745 91.5 kg (201 lb 11.5 oz)   04/01/22 0850 98 kg (216 lb 0.8 oz)       PHYSICAL EXAM  CONSTITUTIONAL: Well built, well nourished in no apparent distress  NECK: no carotid bruit, no JVD  LUNGS: CTA  CHEST WALL: no tenderness  HEART: regular rate  and rhythm, S1, S2 normal, no murmur, click, rub or gallop   ABDOMEN: soft, non-tender; bowel sounds normal; no masses,  no organomegaly  EXTREMITIES: Extremities normal, no edema- Right wrist soft no hematoma  NEURO: AAO X 3    SCHEDULED MEDS:   amLODIPine  10 mg Oral Daily    aspirin  81 mg Oral Daily    atorvastatin  80 mg Oral QHS    carvediloL  12.5 mg Oral BID WM    hydrALAZINE  25 mg Oral Q8H    isosorbide dinitrate  40 mg Oral TID    multivitamin  1 tablet Oral Daily    ticagrelor  90 mg Oral BID       CONTINUOUS INFUSIONS:      PRN MEDS:acetaminophen, calcium chloride IVPB, calcium chloride IVPB, calcium chloride IVPB, dextrose 10%, dextrose 10%, dextrose 50%, dextrose 50%, glucagon (human recombinant), glucose, glucose, hydrALAZINE, insulin aspart U-100, magnesium oxide, magnesium sulfate IVPB, magnesium sulfate IVPB, magnesium sulfate IVPB, magnesium sulfate IVPB, nitroglycerin, potassium chloride in water, potassium chloride in water, potassium chloride in water, potassium chloride in water, potassium chloride, potassium chloride, potassium chloride, potassium chloride, senna-docusate 8.6-50 mg, sodium chloride 0.9%    LABS AND DIAGNOSTICS     CBC LAST 3 DAYS  Recent Labs   Lab 04/18/22  1709 04/19/22  0507 04/19/22  1949   WBC 9.57 7.38 7.86   RBC 4.32* 4.02* 3.74*   HGB 12.5* 11.5* 10.7*   HCT 37.3* 34.9* 33.3*   MCV 86 87 89   MCH 28.9 28.6 28.6   MCHC 33.5 33.0 32.1   RDW 14.0 14.3 14.1   PLT 91* 106* 101*   MPV 11.0 11.4 11.7   GRAN 74.6*  7.1 68.6  5.1 63.4  5.0   LYMPH 16.7*  1.6 18.3  1.4 23.9  1.9   MONO 6.9  0.7 9.5  0.7 8.4  0.7   BASO 0.07 0.06 0.06   NRBC 0 0 0       COAGULATION LAST 3 DAYS  Recent Labs   Lab 04/18/22  0141 04/18/22  1115 04/18/22  1709 04/18/22 2001 04/19/22  1949 04/20/22  0409 04/20/22  1012   LABPT 15.1*  --  15.5*  --   --   --   --    INR 1.3  --  1.3  --   --   --   --    APTT 27.8   < >  --    < > 65.7* 97.5* 72.4*    < > = values in this interval not  displayed.       CHEMISTRY LAST 3 DAYS  Recent Labs   Lab 04/18/22  0141 04/18/22  0456 04/19/22  0507 04/19/22  1534 04/19/22  1949 04/20/22  0409 04/21/22  0331      < > 141   < > 137 137 139   K 6.1*   < > 4.1   < > 3.9 4.2 4.0      < > 106   < > 106 104 109   CO2 23   < > 25   < > 21* 24 23   ANIONGAP 7*   < > 10   < > 10 9 7*   BUN 28*   < > 36*   < > 38* 34* 30*   CREATININE 1.9*   < > 2.1*   < > 1.9* 1.8* 1.7*   *   < > 132*   < > 173* 157* 163*   CALCIUM 8.6*   < > 8.9   < > 8.3* 8.3* 8.7   MG 1.7  --  1.8  --   --   --  2.3   ALBUMIN 4.0  --  3.6  --   --  3.5  --    PROT 6.9  --  6.4  --   --  6.3  --    ALKPHOS 78  --  67  --   --  62  --    ALT 24  --  22  --   --  20  --    AST 21  --  40  --   --  28  --    BILITOT 0.9  --  1.2*  --   --  0.9  --     < > = values in this interval not displayed.       CARDIAC PROFILE LAST 3 DAYS  Recent Labs   Lab 04/18/22  0141 04/18/22  0456 04/18/22  1113 04/18/22  1709 04/19/22  1347   *  --   --   --  280*   TROPONINI 0.040 0.720* 10.357* 9.885*  --        ENDOCRINE LAST 3 DAYS  No results for input(s): TSH, PROCAL in the last 168 hours.    LAST ARTERIAL BLOOD GAS  ABG  No results for input(s): PH, PO2, PCO2, HCO3, BE in the last 168 hours.    LAST 7 DAYS MICROBIOLOGY   Microbiology Results (last 7 days)       ** No results found for the last 168 hours. **            MOST RECENT IMAGING  Cardiac catheterization  · The pre-procedure left ventricular end diastolic pressure was 5.  · The RPDA lesion was 99% stenosed with 10% stenosis post-intervention.  · Successful kissing balloon angioplasty of right PDA and distal RCA with   2.5 semi compliant balloons.     The procedure log was documented by Documenter: Sapphire Carbajal RN and   verified by Dave Vernon MD.    Date: 4/20/2022  Time: 3:49 PM      [facility]  Results for orders placed during the hospital encounter of 04/18/22    Echo    Interpretation Summary  · The left ventricle is normal in  size with low normal systolic function.  · The estimated ejection fraction is 50%.  · Normal left ventricular diastolic function.  · Normal right ventricular size with normal right ventricular systolic function.      CURRENT/PREVIOUS VISIT EKG  Results for orders placed or performed during the hospital encounter of 04/18/22   EKG 12-lead    Collection Time: 04/18/22  1:38 AM    Narrative    Test Reason : R07.9,    Vent. Rate : 087 BPM     Atrial Rate : 087 BPM     P-R Int : 160 ms          QRS Dur : 138 ms      QT Int : 416 ms       P-R-T Axes : 063 046 181 degrees     QTc Int : 500 ms    Normal sinus rhythm  Left bundle branch block  Abnormal ECG  When compared with ECG of 30-NOV-2021 17:04,  No significant change was found  Confirmed by Pee MORENO, Shaun DONALDSON ([id number]) on 4/18/2022 4:28:22 PM    Referred By: LAUREANO   SELF           Confirmed By:Shaun Glasgow MD       ASSESSMENT/PLAN:     Active Hospital Problems    Diagnosis    *NSTEMI (non-ST elevation myocardial infarction)    Type 2 diabetes mellitus with stage 3b chronic kidney disease, without long-term current use of insulin       ASSESSMENT & PLAN:     1. NSTEMI s/p PCI  2. EMILEE- improving  3. Pulmonary Edema- improved  4. DM Type 2  5. Hyperkalemia -improved  6. LVEF 50%  7. Hypomag  8. 9 BT VT- Asymptomatic  10. HTN  11. Dyslipidemia      RECOMMENDATIONS:    Continue Brilinta 90 mg PO BID  Continue ASA 81 mg daily  Continue Lipitor 80 mg every night  Continue Hydralazine and Amlodipine at present dosing  Continue Coreg 12.5 mg PO BID  Continue Isordil at present dosing  Lasix  He will need to follow up with Dr. Vernon for brachytherapy    Dr. Vernon discussed case with hospitalist    Geovanna Nunez NP  [facility]  Department of Cardiology  Date of Service: 04/21/2022      I have personally interviewed and examined the patient.  I have reviewed all the Nurse Practitioner's documentation, and agree with the plan.       SAM VERNON M.D.  [place]  [facility]  Department of Cardiology  Date of Service: 04/21/2022  10:57 AM

## 2022-04-21 NOTE — PLAN OF CARE
Pt is cleared from  for discharge.       04/21/22 1131   Final Note   Assessment Type Final Discharge Note   Anticipated Discharge Disposition Home   Hospital Resources/Appts/Education Provided Appointments scheduled by Navigator/Coordinator  (pt will keep may 5th appointment)

## 2022-04-21 NOTE — NURSING
Patient discharged per orders.  All discharge instructions reviewed with patient and patient verbalized understanding.  IV x 2 discontinued.  Tele box returned to monitor room.  Patient went down to pharmacy to  his own medications.  No complaints of pain voiced at this time.  All belongings sent with patient.

## 2022-04-21 NOTE — PLAN OF CARE
04/21/22 1015   Patient Assessment/Suction   Level of Consciousness (AVPU) alert   Respiratory Effort Normal;Unlabored   All Lung Fields Breath Sounds clear   PRE-TX-O2   O2 Device (Oxygen Therapy) room air   SpO2 98 %   Pulse Oximetry Type Continuous   $ Pulse Oximetry - Multiple Charge Pulse Oximetry - Multiple   Pulse 70   Resp 18   Skin Integrity   $ Wound Care Tech Time 15 min   Area Observed Bridge of nose   Skin Appearance without discoloration   Barrier used? Gel Cushion   Preset CPAP/BiPAP Settings   Mode Of Delivery Standby   $ CPAP/BiPAP Daily Charge BiPAP/CPAP Daily   $ Initial CPAP/BiPAP Setup? No   Equipment Type V60   Airway Device Type large full face mask   Ipap 12   EPAP (cm H2O) 6   Pressure Support (cm H2O) 6   Set Rate (Breaths/Min) 12   ITime (sec) 1   Rise Time (sec) 3   CPAP/BiPAP Alarms   High Pressure (cm H2O) 50   Low Pressure (cm H2O) 10   Minute Ventilation (L/Min) 3   High RR (breaths/min) 50   Low RR (breaths/min) 10   Apnea (Sec) 20   Education   $ Education BiPAP;15 min   Respiratory Evaluation   $ Care Plan Tech Time 15 min

## 2022-04-21 NOTE — CARE UPDATE
04/20/22 2021   Patient Assessment/Suction   Respiratory Effort Unlabored   KINGA Breath Sounds clear   LLL Breath Sounds diminished   RUL Breath Sounds clear   RML Breath Sounds diminished   RLL Breath Sounds diminished   Rhythm/Pattern, Respiratory pattern regular   PRE-TX-O2   O2 Device (Oxygen Therapy) room air   SpO2 97 %   Pulse Oximetry Type Continuous   Pulse 67   Resp 20   Preset CPAP/BiPAP Settings   Mode Of Delivery Standby   Education   $ Education 15 min;BiPAP   Respiratory Evaluation   $ Care Plan Tech Time 15 min   $ Eval/Re-eval Charges Re-evaluation   Evaluation For   (care plan)

## 2022-04-25 NOTE — PHYSICIAN QUERY
PT Name: Amadeo Levin  MR #: [id number]     Documentation Clarification      CDS/: Marybel Browne               Contact information:    This form is a permanent document in the medical record.     Query Date: April 25, 2022    By submitting this query, we are merely seeking further clarification of documentation. Please utilize your independent clinical judgment when addressing the question(s) below.    The Medical Record reflects the following:    Supporting Clinical Findings Location in Medical Record   - unchanged left-sided coronary circulation with severe mid LAD stenosis with ZI 3 flow  - patent proximal and mid to distal RCA stents with severe subtotal greater than 90% ostial InStent restenoses of right PDA stent.  Unable to balloon the stent as balloon does not cross despite multiple wire crossings and multiple balloon sizes including 1.0. Consults by Dave Vernon MD at 4/18/2022 11:55 AM       · The RPDA lesion was 99% stenosed with 10% stenosis post-intervention.  · Successful kissing balloon angioplasty of right PDA and distal RCA with   2.5 semi compliant balloons. Cardiac catheterization   Resulted: 04/18/22 1426, Result status: Final result  Ordering provider: Dave Vernon MD  04/18/22 1142                                                                                 Provider, Is the NSTEMI due to in-stent restenosis of RPDA?    [  x ] NSTEMI due to in-stent restenosis of RPDA, Ruled in    [   ] NSTEMI due to in-stent restenosis of RPDA, Ruled out   [  ] Clinically undetermined

## 2022-05-03 ENCOUNTER — PATIENT OUTREACH (OUTPATIENT)
Dept: ADMINISTRATIVE | Facility: OTHER | Age: 72
End: 2022-05-03
Payer: MEDICARE

## 2022-05-04 DIAGNOSIS — I25.10 CORONARY ARTERY DISEASE INVOLVING NATIVE CORONARY ARTERY OF NATIVE HEART WITHOUT ANGINA PECTORIS: Primary | ICD-10-CM

## 2022-05-04 NOTE — PROGRESS NOTES
Patient's chart was reviewed for overdue YRIS topics.  Health maintenance:updated  Immunizations:reconciled   Legacy:   Media:  Orders placed:  Tasked appts:  Labs Linked:  Upcoming appt:

## 2022-05-05 ENCOUNTER — DOCUMENTATION ONLY (OUTPATIENT)
Dept: CARDIOLOGY | Facility: CLINIC | Age: 72
End: 2022-05-05
Payer: MEDICARE

## 2022-05-05 ENCOUNTER — OFFICE VISIT (OUTPATIENT)
Dept: CARDIOLOGY | Facility: CLINIC | Age: 72
End: 2022-05-05
Payer: MEDICARE

## 2022-05-05 ENCOUNTER — OFFICE VISIT (OUTPATIENT)
Dept: RADIATION ONCOLOGY | Facility: CLINIC | Age: 72
End: 2022-05-05
Payer: MEDICARE

## 2022-05-05 VITALS
SYSTOLIC BLOOD PRESSURE: 194 MMHG | HEART RATE: 59 BPM | DIASTOLIC BLOOD PRESSURE: 86 MMHG | HEIGHT: 69 IN | BODY MASS INDEX: 30.7 KG/M2 | WEIGHT: 207.25 LBS | OXYGEN SATURATION: 100 %

## 2022-05-05 VITALS
BODY MASS INDEX: 30.57 KG/M2 | WEIGHT: 207 LBS | RESPIRATION RATE: 18 BRPM | SYSTOLIC BLOOD PRESSURE: 194 MMHG | DIASTOLIC BLOOD PRESSURE: 81 MMHG | HEART RATE: 58 BPM

## 2022-05-05 DIAGNOSIS — E78.5 HYPERLIPIDEMIA ASSOCIATED WITH TYPE 2 DIABETES MELLITUS: ICD-10-CM

## 2022-05-05 DIAGNOSIS — E11.22 CKD STAGE 3 DUE TO TYPE 2 DIABETES MELLITUS: ICD-10-CM

## 2022-05-05 DIAGNOSIS — D69.6 THROMBOCYTOPENIA: ICD-10-CM

## 2022-05-05 DIAGNOSIS — E11.69 HYPERLIPIDEMIA ASSOCIATED WITH TYPE 2 DIABETES MELLITUS: ICD-10-CM

## 2022-05-05 DIAGNOSIS — E11.59 HYPERTENSION ASSOCIATED WITH DIABETES: ICD-10-CM

## 2022-05-05 DIAGNOSIS — I25.118 CORONARY ARTERY DISEASE OF NATIVE ARTERY OF NATIVE HEART WITH STABLE ANGINA PECTORIS: Primary | ICD-10-CM

## 2022-05-05 DIAGNOSIS — N18.30 CKD STAGE 3 DUE TO TYPE 2 DIABETES MELLITUS: ICD-10-CM

## 2022-05-05 DIAGNOSIS — I15.2 HYPERTENSION ASSOCIATED WITH DIABETES: ICD-10-CM

## 2022-05-05 DIAGNOSIS — R09.89 BILATERAL CAROTID BRUITS: ICD-10-CM

## 2022-05-05 DIAGNOSIS — E11.22 TYPE 2 DIABETES MELLITUS WITH STAGE 3B CHRONIC KIDNEY DISEASE, WITHOUT LONG-TERM CURRENT USE OF INSULIN: ICD-10-CM

## 2022-05-05 DIAGNOSIS — N18.32 TYPE 2 DIABETES MELLITUS WITH STAGE 3B CHRONIC KIDNEY DISEASE, WITHOUT LONG-TERM CURRENT USE OF INSULIN: ICD-10-CM

## 2022-05-05 DIAGNOSIS — I25.119 CORONARY ARTERY DISEASE INVOLVING NATIVE HEART WITH ANGINA PECTORIS, UNSPECIFIED VESSEL OR LESION TYPE: Primary | ICD-10-CM

## 2022-05-05 DIAGNOSIS — D64.9 ANEMIA, UNSPECIFIED TYPE: ICD-10-CM

## 2022-05-05 DIAGNOSIS — E66.9 OBESITY (BMI 30.0-34.9): ICD-10-CM

## 2022-05-05 PROCEDURE — 3062F POS MACROALBUMINURIA REV: CPT | Mod: CPTII,S$GLB,, | Performed by: INTERNAL MEDICINE

## 2022-05-05 PROCEDURE — 3066F PR DOCUMENTATION OF TREATMENT FOR NEPHROPATHY: ICD-10-PCS | Mod: CPTII,S$GLB,, | Performed by: INTERNAL MEDICINE

## 2022-05-05 PROCEDURE — 3079F DIAST BP 80-89 MM HG: CPT | Mod: CPTII,S$GLB,, | Performed by: RADIOLOGY

## 2022-05-05 PROCEDURE — 3288F FALL RISK ASSESSMENT DOCD: CPT | Mod: CPTII,S$GLB,, | Performed by: RADIOLOGY

## 2022-05-05 PROCEDURE — 3051F PR MOST RECENT HEMOGLOBIN A1C LEVEL 7.0 - < 8.0%: ICD-10-PCS | Mod: CPTII,S$GLB,, | Performed by: INTERNAL MEDICINE

## 2022-05-05 PROCEDURE — 3077F SYST BP >= 140 MM HG: CPT | Mod: CPTII,S$GLB,, | Performed by: RADIOLOGY

## 2022-05-05 PROCEDURE — 3008F BODY MASS INDEX DOCD: CPT | Mod: CPTII,S$GLB,, | Performed by: INTERNAL MEDICINE

## 2022-05-05 PROCEDURE — 99999 PR PBB SHADOW E&M-EST. PATIENT-LVL IV: ICD-10-PCS | Mod: PBBFAC,,, | Performed by: INTERNAL MEDICINE

## 2022-05-05 PROCEDURE — 1159F MED LIST DOCD IN RCRD: CPT | Mod: CPTII,S$GLB,, | Performed by: RADIOLOGY

## 2022-05-05 PROCEDURE — 3051F HG A1C>EQUAL 7.0%<8.0%: CPT | Mod: CPTII,S$GLB,, | Performed by: INTERNAL MEDICINE

## 2022-05-05 PROCEDURE — 1159F MED LIST DOCD IN RCRD: CPT | Mod: CPTII,S$GLB,, | Performed by: INTERNAL MEDICINE

## 2022-05-05 PROCEDURE — 3008F PR BODY MASS INDEX (BMI) DOCUMENTED: ICD-10-PCS | Mod: CPTII,S$GLB,, | Performed by: RADIOLOGY

## 2022-05-05 PROCEDURE — 3062F POS MACROALBUMINURIA REV: CPT | Mod: CPTII,S$GLB,, | Performed by: RADIOLOGY

## 2022-05-05 PROCEDURE — 1160F RVW MEDS BY RX/DR IN RCRD: CPT | Mod: CPTII,S$GLB,, | Performed by: INTERNAL MEDICINE

## 2022-05-05 PROCEDURE — 1160F PR REVIEW ALL MEDS BY PRESCRIBER/CLIN PHARMACIST DOCUMENTED: ICD-10-PCS | Mod: CPTII,S$GLB,, | Performed by: INTERNAL MEDICINE

## 2022-05-05 PROCEDURE — 4010F PR ACE/ARB THEARPY RXD/TAKEN: ICD-10-PCS | Mod: CPTII,S$GLB,, | Performed by: RADIOLOGY

## 2022-05-05 PROCEDURE — 3062F PR POS MACROALBUMINURIA RESULT DOCUMENTED/REVIEW: ICD-10-PCS | Mod: CPTII,S$GLB,, | Performed by: INTERNAL MEDICINE

## 2022-05-05 PROCEDURE — 3066F PR DOCUMENTATION OF TREATMENT FOR NEPHROPATHY: ICD-10-PCS | Mod: CPTII,S$GLB,, | Performed by: RADIOLOGY

## 2022-05-05 PROCEDURE — 1111F DSCHRG MED/CURRENT MED MERGE: CPT | Mod: CPTII,S$GLB,, | Performed by: RADIOLOGY

## 2022-05-05 PROCEDURE — 1126F AMNT PAIN NOTED NONE PRSNT: CPT | Mod: CPTII,S$GLB,, | Performed by: INTERNAL MEDICINE

## 2022-05-05 PROCEDURE — 99999 PR PBB SHADOW E&M-EST. PATIENT-LVL III: CPT | Mod: PBBFAC,,, | Performed by: RADIOLOGY

## 2022-05-05 PROCEDURE — 1101F PR PT FALLS ASSESS DOC 0-1 FALLS W/OUT INJ PAST YR: ICD-10-PCS | Mod: CPTII,S$GLB,, | Performed by: RADIOLOGY

## 2022-05-05 PROCEDURE — 99999 PR PBB SHADOW E&M-EST. PATIENT-LVL IV: CPT | Mod: PBBFAC,,, | Performed by: INTERNAL MEDICINE

## 2022-05-05 PROCEDURE — 1159F PR MEDICATION LIST DOCUMENTED IN MEDICAL RECORD: ICD-10-PCS | Mod: CPTII,S$GLB,, | Performed by: RADIOLOGY

## 2022-05-05 PROCEDURE — 3079F DIAST BP 80-89 MM HG: CPT | Mod: CPTII,S$GLB,, | Performed by: INTERNAL MEDICINE

## 2022-05-05 PROCEDURE — 1126F PR PAIN SEVERITY QUANTIFIED, NO PAIN PRESENT: ICD-10-PCS | Mod: CPTII,S$GLB,, | Performed by: INTERNAL MEDICINE

## 2022-05-05 PROCEDURE — 3008F BODY MASS INDEX DOCD: CPT | Mod: CPTII,S$GLB,, | Performed by: RADIOLOGY

## 2022-05-05 PROCEDURE — 3066F NEPHROPATHY DOC TX: CPT | Mod: CPTII,S$GLB,, | Performed by: RADIOLOGY

## 2022-05-05 PROCEDURE — 4010F ACE/ARB THERAPY RXD/TAKEN: CPT | Mod: CPTII,S$GLB,, | Performed by: INTERNAL MEDICINE

## 2022-05-05 PROCEDURE — 99214 OFFICE O/P EST MOD 30 MIN: CPT | Mod: S$GLB,,, | Performed by: INTERNAL MEDICINE

## 2022-05-05 PROCEDURE — 1126F PR PAIN SEVERITY QUANTIFIED, NO PAIN PRESENT: ICD-10-PCS | Mod: CPTII,S$GLB,, | Performed by: RADIOLOGY

## 2022-05-05 PROCEDURE — 1111F PR DISCHARGE MEDS RECONCILED W/ CURRENT OUTPATIENT MED LIST: ICD-10-PCS | Mod: CPTII,S$GLB,, | Performed by: INTERNAL MEDICINE

## 2022-05-05 PROCEDURE — 1111F DSCHRG MED/CURRENT MED MERGE: CPT | Mod: CPTII,S$GLB,, | Performed by: INTERNAL MEDICINE

## 2022-05-05 PROCEDURE — 1111F PR DISCHARGE MEDS RECONCILED W/ CURRENT OUTPATIENT MED LIST: ICD-10-PCS | Mod: CPTII,S$GLB,, | Performed by: RADIOLOGY

## 2022-05-05 PROCEDURE — 3008F PR BODY MASS INDEX (BMI) DOCUMENTED: ICD-10-PCS | Mod: CPTII,S$GLB,, | Performed by: INTERNAL MEDICINE

## 2022-05-05 PROCEDURE — 3077F PR MOST RECENT SYSTOLIC BLOOD PRESSURE >= 140 MM HG: ICD-10-PCS | Mod: CPTII,S$GLB,, | Performed by: RADIOLOGY

## 2022-05-05 PROCEDURE — 99202 OFFICE O/P NEW SF 15 MIN: CPT | Mod: S$GLB,,, | Performed by: RADIOLOGY

## 2022-05-05 PROCEDURE — 3079F PR MOST RECENT DIASTOLIC BLOOD PRESSURE 80-89 MM HG: ICD-10-PCS | Mod: CPTII,S$GLB,, | Performed by: RADIOLOGY

## 2022-05-05 PROCEDURE — 1159F PR MEDICATION LIST DOCUMENTED IN MEDICAL RECORD: ICD-10-PCS | Mod: CPTII,S$GLB,, | Performed by: INTERNAL MEDICINE

## 2022-05-05 PROCEDURE — 3077F PR MOST RECENT SYSTOLIC BLOOD PRESSURE >= 140 MM HG: ICD-10-PCS | Mod: CPTII,S$GLB,, | Performed by: INTERNAL MEDICINE

## 2022-05-05 PROCEDURE — 99202 PR OFFICE/OUTPT VISIT, NEW, LEVL II, 15-29 MIN: ICD-10-PCS | Mod: S$GLB,,, | Performed by: RADIOLOGY

## 2022-05-05 PROCEDURE — 4010F ACE/ARB THERAPY RXD/TAKEN: CPT | Mod: CPTII,S$GLB,, | Performed by: RADIOLOGY

## 2022-05-05 PROCEDURE — 3288F PR FALLS RISK ASSESSMENT DOCUMENTED: ICD-10-PCS | Mod: CPTII,S$GLB,, | Performed by: RADIOLOGY

## 2022-05-05 PROCEDURE — 99999 PR PBB SHADOW E&M-EST. PATIENT-LVL III: ICD-10-PCS | Mod: PBBFAC,,, | Performed by: RADIOLOGY

## 2022-05-05 PROCEDURE — 99214 PR OFFICE/OUTPT VISIT, EST, LEVL IV, 30-39 MIN: ICD-10-PCS | Mod: S$GLB,,, | Performed by: INTERNAL MEDICINE

## 2022-05-05 PROCEDURE — 3062F PR POS MACROALBUMINURIA RESULT DOCUMENTED/REVIEW: ICD-10-PCS | Mod: CPTII,S$GLB,, | Performed by: RADIOLOGY

## 2022-05-05 PROCEDURE — 1101F PT FALLS ASSESS-DOCD LE1/YR: CPT | Mod: CPTII,S$GLB,, | Performed by: RADIOLOGY

## 2022-05-05 PROCEDURE — 1126F AMNT PAIN NOTED NONE PRSNT: CPT | Mod: CPTII,S$GLB,, | Performed by: RADIOLOGY

## 2022-05-05 PROCEDURE — 3066F NEPHROPATHY DOC TX: CPT | Mod: CPTII,S$GLB,, | Performed by: INTERNAL MEDICINE

## 2022-05-05 PROCEDURE — 3079F PR MOST RECENT DIASTOLIC BLOOD PRESSURE 80-89 MM HG: ICD-10-PCS | Mod: CPTII,S$GLB,, | Performed by: INTERNAL MEDICINE

## 2022-05-05 PROCEDURE — 4010F PR ACE/ARB THEARPY RXD/TAKEN: ICD-10-PCS | Mod: CPTII,S$GLB,, | Performed by: INTERNAL MEDICINE

## 2022-05-05 PROCEDURE — 3077F SYST BP >= 140 MM HG: CPT | Mod: CPTII,S$GLB,, | Performed by: INTERNAL MEDICINE

## 2022-05-05 RX ORDER — VALSARTAN 80 MG/1
80 TABLET ORAL DAILY
Status: ON HOLD | COMMUNITY
End: 2022-06-09 | Stop reason: HOSPADM

## 2022-05-05 RX ORDER — SODIUM CHLORIDE 9 MG/ML
INJECTION, SOLUTION INTRAVENOUS CONTINUOUS
Status: CANCELLED | OUTPATIENT
Start: 2022-05-05 | End: 2022-05-05

## 2022-05-05 RX ORDER — DIPHENHYDRAMINE HCL 50 MG
50 CAPSULE ORAL ONCE
Status: CANCELLED | OUTPATIENT
Start: 2022-05-05 | End: 2022-05-05

## 2022-05-05 NOTE — PROGRESS NOTES
Subjective:    Patient ID:  Amadeo Levin is a 71 y.o. male who presents for follow-up of Coronary Artery Disease    Referring cardiologist: Dr. Janeen GUO  Mr. Childs is a 71-year-old male with history of hypertension, diabetes (A1c 9.2) , past DVT in LLE several years ago (previosuly on Apxiban), hyperlipidemia, coronary artery disease status post PCI 17 years ago who presents for evaluation for brachytherapy.  The patient underwent RCA PCI on 11/30/21 after which he was asymptomatic with regard to angina.  On Easter weekend 2022 he developed recurrent angina and presented to [facility] with an NSTEMI.  He was found to have in-stent-restenosis of his distal RCA/PDA/PLB stents.  These were treated with POBA with the plan for the patient to receive brachytherapy.  Today the patient denies recurrent angina since his angioplasty.  He denies lower extremity edema, orthopnea or PND.  He denies palpitations, syncope or near syncope.    Past Medical History:   Diagnosis Date    Coronary artery disease     Diabetes mellitus type II     Hypertension      Past Surgical History:   Procedure Laterality Date    ANGIOGRAM, CORONARY, WITH LEFT HEART CATHETERIZATION N/A 11/27/2021    Procedure: Angiogram, Coronary, with Left Heart Cath;  Surgeon: Dave Vernon MD;  Location: [facility] CATH/EP LAB;  Service: Cardiology;  Laterality: N/A;    ANGIOGRAM, CORONARY, WITH LEFT HEART CATHETERIZATION N/A 4/18/2022    Procedure: Angiogram, Coronary, with Left Heart Cath;  Surgeon: Dave Vernno MD;  Location: [facility] CATH/EP LAB;  Service: Cardiology;  Laterality: N/A;    APPENDECTOMY      CORONARY ANGIOGRAPHY Left 11/30/2021    Procedure: ANGIOGRAM, CORONARY ARTERY;  Surgeon: Gunnar Vernon MD;  Location[facility] C[facility] LAB;  Service: Cardiology;  Laterality: Left;    HERNIA REPAIR      LEFT HEART CATHETERIZATION Left 4/20/2022    Procedure: Left heart cath;  Surgeon: Dave Vernon MD;  Location: [facility] CATH/EP LAB;  Service: Cardiology;   Laterality: Left;    PERCUTANEOUS TRANSLUMINAL ANGIOPLASTY N/A 4/20/2022    Procedure: PTA (ANGIOPLASTY, PERCUTANEOUS, TRANSLUMINAL);  Surgeon: Dave Vernon MD;  Location: [facility] CATH/EP LAB;  Service: Cardiology;  Laterality: N/A;    stents       Current Outpatient Medications on File Prior to Visit   Medication Sig Dispense Refill    aspirin (ECOTRIN) 81 MG EC tablet Take 1 tablet (81 mg total) by mouth once daily. 30 tablet 11    atorvastatin (LIPITOR) 80 MG tablet Take 1 tablet (80 mg total) by mouth every evening. 90 tablet 0    carvediloL (COREG) 12.5 MG tablet Take 1 tablet (12.5 mg total) by mouth 2 (two) times daily with meals. 180 tablet 3    glipiZIDE (GLUCOTROL) 10 MG tablet Take 1 tablet (10 mg total) by mouth 2 (two) times daily with meals. 180 tablet 1    hydrALAZINE (APRESOLINE) 25 MG tablet Take 1 tablet (25 mg total) by mouth every 8 (eight) hours. 90 tablet 1    isosorbide dinitrate (ISORDIL) 20 MG tablet Take 2 tablets (40 mg total) by mouth 3 (three) times daily. 180 tablet 1    metFORMIN (GLUCOPHAGE) 1000 MG tablet Take 1 tablet (1,000 mg total) by mouth 2 (two) times daily with meals. Please hold medication for 2 more days. Can restart on 12/3/21 180 tablet 1    MULTIVITAMIN W-MINERALS/LUTEIN (CENTRUM SILVER ORAL) Take 1 capsule by mouth once daily.      SITagliptin (JANUVIA) 100 MG Tab Take 1 tablet (100 mg total) by mouth once daily. 30 tablet 11    ticagrelor (BRILINTA) 90 mg tablet Take 1 tablet (90 mg total) by mouth 2 (two) times a day. 60 tablet 1    alcohol swabs (BD ALCOHOL SWABS) PadM Apply 1 each topically as needed. 100 each 6    amLODIPine (NORVASC) 5 MG tablet Take 1 tablet (5 mg total) by mouth once daily. 90 tablet 0    blood sugar diagnostic (ACCU-CHEK OLGA PLUS TEST STRP) Strp 1 each by Misc.(Non-Drug; Combo Route) route daily as needed. 100 each 6    blood sugar diagnostic Strp To check BG 2 times daily, to use with insurance preferred meter 200 strip 2     chlorthalidone (HYGROTEN) 25 MG Tab Take 0.5 tablets (12.5 mg total) by mouth once daily. 45 tablet 0    lancets Misc To check BG 2 times daily, to use with insurance preferred meter 100 each 0    valsartan (DIOVAN) 80 MG tablet Take 80 mg by mouth once daily.      [DISCONTINUED] clopidogreL (PLAVIX) 75 mg tablet Take 1 tablet (75 mg total) by mouth once daily. 90 tablet 1     No current facility-administered medications on file prior to visit.     Review of patient's allergies indicates:   Allergen Reactions    Morphine Nausea And Vomiting    Vicodin [hydrocodone-acetaminophen] Nausea And Vomiting     Social History     Tobacco Use    Smoking status: Never Smoker    Smokeless tobacco: Never Used   Substance Use Topics    Alcohol use: Not Currently     Comment: 2 a year    Drug use: No     Family History   Problem Relation Age of Onset    Diabetes Mother     Cancer Neg Hx     Macular degeneration Neg Hx     Retinal detachment Neg Hx     Glaucoma Neg Hx            Review of Systems   Constitutional: Negative for decreased appetite, diaphoresis, fever, malaise/fatigue, weight gain and weight loss.   HENT: Negative for congestion, nosebleeds and sore throat.    Eyes: Negative for blurred vision, vision loss in left eye, vision loss in right eye and visual disturbance.   Cardiovascular: Negative for chest pain, claudication, dyspnea on exertion, leg swelling, near-syncope, orthopnea, palpitations, paroxysmal nocturnal dyspnea and syncope.   Respiratory: Negative for cough, hemoptysis, shortness of breath and wheezing.    Endocrine: Negative for polyuria.   Hematologic/Lymphatic: Does not bruise/bleed easily.   Skin: Negative for nail changes and rash.   Musculoskeletal: Negative for back pain, muscle cramps and myalgias.   Gastrointestinal: Negative for abdominal pain, change in bowel habit, diarrhea, heartburn, hematemesis, hematochezia, melena, nausea and vomiting.   Genitourinary: Negative for bladder  "incontinence, dysuria, frequency and hematuria.   Psychiatric/Behavioral: Negative for depression.   Allergic/Immunologic: Negative for hives.        Objective:  Vitals:    05/05/22 1104 05/05/22 1106   BP: (!) 195/81 (!) 194/86   BP Location: Left arm Right arm   Patient Position: Sitting Sitting   BP Method: Large (Automatic) Large (Automatic)   Pulse: (!) 58 (!) 59   SpO2: 100%    Weight: 94 kg (207 lb 3.7 oz)    Height: 5' 9" (1.753 m)          Physical Exam  Constitutional:       Appearance: Normal appearance. He is well-developed.   HENT:      Head: Normocephalic and atraumatic.   Eyes:      Pupils: Pupils are equal, round, and reactive to light.   Neck:      Thyroid: No thyromegaly.      Vascular: No JVD.   Cardiovascular:      Rate and Rhythm: Normal rate and regular rhythm.      Chest Wall: PMI is displaced.      Pulses:           Carotid pulses are 2+ on the right side and 2+ on the left side.       Radial pulses are 2+ on the right side and 2+ on the left side.        Femoral pulses are 2+ on the right side and 2+ on the left side.       Dorsalis pedis pulses are 2+ on the right side and 2+ on the left side.        Posterior tibial pulses are 2+ on the right side and 2+ on the left side.      Heart sounds: No murmur heard.    No friction rub. No gallop.      Comments: Normal right radial Isai's test.  Pulmonary:      Effort: Pulmonary effort is normal.      Breath sounds: No wheezing, rhonchi or rales.   Abdominal:      General: There is no distension.      Palpations: Abdomen is soft.      Tenderness: There is no abdominal tenderness.   Musculoskeletal:      Cervical back: Neck supple.   Neurological:      Mental Status: He is alert and oriented to person, place, and time.      Gait: Gait normal.           Assessment:       1. Coronary artery disease involving native heart with angina pectoris, unspecified vessel or lesion type    2. Bilateral carotid bruits    3. Hyperlipidemia associated with type 2 " diabetes mellitus    4. Hypertension associated with diabetes    5. CKD stage 3 due to type 2 diabetes mellitus    6. Anemia, unspecified type    7. Obesity (BMI 30.0-34.9)    8. Type 2 diabetes mellitus with stage 3b chronic kidney disease, without long-term current use of insulin    9. Thrombocytopenia         Plan:       1) CAD.  The patient presented with an NSTEMI on Easter weekend and was found to have InStent restenosis of his distal RCA bifurcation stents.  He underwent balloon angioplasty at that time with an excellent result.  He presents to clinic today for evaluation of possible brachytherapy to that site.  I discussed brachytherapy with the patient in detail he would like to proceed.  -8 Frisian right CFA access  -will refer the patient to Radiation Oncology for evaluation for brachytherapy  -The risks, benefits, and alternatives of cardiac catheterization and possible intervention were discussed with the patient.  All questions were answered and informed consent was obtained.  -continue enteric-coated aspirin 81 mg p.o. q.day  -continue ticagrelor 90 mg p.o. b.i.d.  -continue Coreg 12.5 mg p.o. b.i.d.    2) hypertension.  The patient's blood pressure is markedly elevated in clinic today; will reassessed post catheterization  -continue Coreg 12.5 mg p.o. b.i.d.  -continue hydralazine 25 mg p.o. t.i.d.  -continue isosorbide dinitrate 20 mg p.o. b.i.d.  -continue valsartan 80 mg p.o. q.day    3) dyslipidemia.  04/01/2022 lipid profile reviewed  -continue Lipitor 80 mg p.o. q.day    4) CKD 3A.  The patient's creatinine on 04/21/2022 was 1.7;  -check creatinine prior to cardiac catheterization  -recommend oral hydration the day prior to cardiac catheterization and will provide IV hydration the day of the procedure.    5) type 2 diabetes.  The patient's hemoglobin A1c on 04/01/2022 was 7.4 his hemoglobin A1c on 11/27/2021 was 9.2;   Recommend tight glycemic control as uncontrolled diabetes is a risk factor  for InStent restenosis    6) anemia.  Patient's hemoglobin on 04/19/2022 was 10.7 with an MCV of 89.  -check CBC prior to cardiac catheterization     7) thrombocytopenia.  The patient's platelet count on 04/19/2022 was 101; he has no active bleeding diathesis of which he is aware.  -check platelet count prior to cardiac catheterization    All of the patient's questions were answered.

## 2022-05-05 NOTE — PROGRESS NOTES
OUTPATIENT CATHETERIZATION INSTRUCTIONS    You have been scheduled for a procedure in the catheterization lab on Wednesday, June 22, 2022.     Please report to the Cardiology Waiting Area on the Third floor of the hospital and check in at 6 AM.   You will then be taken to the SSCU (Short Stay Cardiac Unit) and prepared for your procedure. Please be aware that this is not the time of your procedure but the time you are to arrive. The procedures are scheduled on an hourly basis; however, emergency cases take precedence over all other cases.     1. No solid foods 8 hours prior to your procedure.  You may have clear liquids until the time of your admission which should be 2 hours prior to your procedure.  You are encouraged to drink at least 8 ounces of clear liquids prior to your admission to SSCU.  Patients with gastric emptying issues should be fasting for 6- 8 hours prior to the procedure.  Clear liquids include water, black coffee, clear juices, and performance drinks - no pulp or milk.    2. Heart failure or dialysis patients will be limited to 8 ounces (1 cup) of clear liquids up until 2 hours of the procedure.    3.   You may take your regular morning medications with water. If there are any medications that you should not take you will be instructed to hold them that morning. If you         are diabetic and on Metformin (Glucophage) do not take it the day before, the day of, and for 2 days after your procedure.  4.   If you are on Pradaxa, Eliquis or Coumadin , you can hold them 3 days prior to your procedure.    The procedure will take 1-2 hours to perform. After the procedure, you will return to SSCU on the third floor of the hospital. You will need to lie still (or keep your arm still) for the next 4 to 6 hours to minimize bleeding from the puncture site. Your family may remain in the room with you during this time.     You may be able to be discharged home that same afternoon if there is someone to drive  you home and there were no complications. If you have one of the balloon, stent, or device procedures you may spend the night in the hospital. Your doctor will determine, based on your progress, the date and time of your discharge. The results of your procedure will be discussed with you before you are discharged. Any further testing or procedures will be scheduled for you either before you leave or you will be called with these appointments.     If you should have any questions, concerns, or need to change the date of your procedure, please call  RUTHY Olea @ [phone]    Special Instructions:    Hold Metformin Tuesday, Wednesday, Thursday and Friday  Drink plenty of water the day before and after your procedure.     THE ABOVE INSTRUCTIONS WERE GIVEN TO THE PATIENT VERBALLY AND THEY VERBALIZED UNDERSTANDING.  THEY DO NOT REQUIRE ANY SPECIAL NEEDS AND DO NOT HAVE ANY LEARNING BARRIERS.          Directions for Reporting to Cardiology Waiting Area in the Hospital  If you park in the Parking Garage:  Take elevators to the1st floor of the parking garage.  Continue past the gift shop, coffee shop, and piano.  Take a right and go to the gold elevators. (Elevator B)  Take the elevator to the 3rd floor.  Follow the arrow on the sign on the wall that says Cath Lab Registration/EP Lab Registration.  Follow the long hallway all the way around until you come to a big open area.  This is the registration area.  Check in at Reception Desk.    OR    If family is dropping you off:  Have them drop you off at the front of the Hospital under the green overhang.  Enter through the doors and take a right.  Take the E elevators to the 3rd floor Cardiology Waiting Area.  Check in at the Reception Desk in the waiting room.

## 2022-05-05 NOTE — PROGRESS NOTES
05/05/2022    Radiation Oncology Consultation    Assessment   This is a 71 y.o. y/o male with CAD and h/o PCI placed 11/2021 presenting with recurrence of angina in 4/2022. Heart cath revealed in-stent restenosis of the previously stented vessels. He is referred for consideration of intravascular brachytherapy.     He is referred for consideration of intravascular brachytherapy to reduce risk of restenosis. Potential short- and long-term risks of brachytherapy were discussed. At the conclusion of our discussion, he wished to proceed with the recommended treatment at time of cardiac catheterization.           Plan   1) We will be available for intravascular brachytherapy at the time of heart cath planned for 6/22/22.        CHIEF COMPLAINT: In-stent restenosis of coronary artery    HPI: Mr. Levin is a 72 y/o male with CAD and h/o PCI placed 11/2021 presenting with recurrence of angina in 4/2022. Heart cath revealed in-stent restenosis of the previously stented vessels. He is referred for consideration of intravascular brachytherapy.     In clinic today, he is unaccompanied. Today he denies chest pain, fevers, weight loss, cough, or shortness of breath.       Prior Radiation History: None    Past Medical History:   Diagnosis Date    Coronary artery disease     Diabetes mellitus type II     Hypertension        Past Surgical History:   Procedure Laterality Date    ANGIOGRAM, CORONARY, WITH LEFT HEART CATHETERIZATION N/A 11/27/2021    Procedure: Angiogram, Coronary, with Left Heart Cath;  Surgeon: Dave Vernon MD;  Location: [facility] CATH/EP LAB;  Service: Cardiology;  Laterality: N/A;    ANGIOGRAM, CORONARY, WITH LEFT HEART CATHETERIZATION N/A 4/18/2022    Procedure: Angiogram, Coronary, with Left Heart Cath;  Surgeon: Dave Vernon MD;  Location: [facility] CATH/EP LAB;  Service: Cardiology;  Laterality: N/A;    APPENDECTOMY      CORONARY ANGIOGRAPHY Left 11/30/2021    Procedure: ANGIOGRAM, CORONARY ARTERY;  Surgeon:  Gunnar Vernon MD;  Location: [facility] CATH LAB;  Service: Cardiology;  Laterality: Left;    HERNIA REPAIR      LEFT HEART CATHETERIZATION Left 4/20/2022    Procedure: Left heart cath;  Surgeon: Dave Vernon MD;  Location: [facility] CATH/EP LAB;  Service: Cardiology;  Laterality: Left;    PERCUTANEOUS TRANSLUMINAL ANGIOPLASTY N/A 4/20/2022    Procedure: PTA (ANGIOPLASTY, PERCUTANEOUS, TRANSLUMINAL);  Surgeon: Dave Vernon MD;  Location: [facility] CATH/EP LAB;  Service: Cardiology;  Laterality: N/A;    stents         Social History     Tobacco Use    Smoking status: Never Smoker    Smokeless tobacco: Never Used   Substance Use Topics    Alcohol use: Not Currently     Comment: 2 a year    Drug use: No       Cancer-related family history is negative for Cancer.    Current Outpatient Medications on File Prior to Visit   Medication Sig Dispense Refill    alcohol swabs (BD ALCOHOL SWABS) PadM Apply 1 each topically as needed. 100 each 6    amLODIPine (NORVASC) 5 MG tablet Take 1 tablet (5 mg total) by mouth once daily. 90 tablet 0    aspirin (ECOTRIN) 81 MG EC tablet Take 1 tablet (81 mg total) by mouth once daily. 30 tablet 11    atorvastatin (LIPITOR) 80 MG tablet Take 1 tablet (80 mg total) by mouth every evening. 90 tablet 0    blood sugar diagnostic (ACCU-CHEK OLGA PLUS TEST STRP) Strp 1 each by Misc.(Non-Drug; Combo Route) route daily as needed. 100 each 6    blood sugar diagnostic Strp To check BG 2 times daily, to use with insurance preferred meter 200 strip 2    carvediloL (COREG) 12.5 MG tablet Take 1 tablet (12.5 mg total) by mouth 2 (two) times daily with meals. 180 tablet 3    chlorthalidone (HYGROTEN) 25 MG Tab Take 0.5 tablets (12.5 mg total) by mouth once daily. 45 tablet 0    glipiZIDE (GLUCOTROL) 10 MG tablet Take 1 tablet (10 mg total) by mouth 2 (two) times daily with meals. 180 tablet 1    hydrALAZINE (APRESOLINE) 25 MG tablet Take 1 tablet (25 mg total) by mouth every 8 (eight) hours.  90 tablet 1    isosorbide dinitrate (ISORDIL) 20 MG tablet Take 2 tablets (40 mg total) by mouth 3 (three) times daily. 180 tablet 1    lancets Misc To check BG 2 times daily, to use with insurance preferred meter 100 each 0    metFORMIN (GLUCOPHAGE) 1000 MG tablet Take 1 tablet (1,000 mg total) by mouth 2 (two) times daily with meals. Please hold medication for 2 more days. Can restart on 12/3/21 180 tablet 1    MULTIVITAMIN W-MINERALS/LUTEIN (CENTRUM SILVER ORAL) Take 1 capsule by mouth once daily.      SITagliptin (JANUVIA) 100 MG Tab Take 1 tablet (100 mg total) by mouth once daily. 30 tablet 11    ticagrelor (BRILINTA) 90 mg tablet Take 1 tablet (90 mg total) by mouth 2 (two) times a day. 60 tablet 1    valsartan (DIOVAN) 80 MG tablet Take 80 mg by mouth once daily.      [DISCONTINUED] clopidogreL (PLAVIX) 75 mg tablet Take 1 tablet (75 mg total) by mouth once daily. 90 tablet 1     No current facility-administered medications on file prior to visit.       Review of patient's allergies indicates:   Allergen Reactions    Morphine Nausea And Vomiting    Vicodin [hydrocodone-acetaminophen] Nausea And Vomiting       Review of Systems   Constitutional: Negative for fever and weight loss.   HENT: Negative for ear pain and sore throat.    Eyes: Negative for blurred vision and double vision.   Respiratory: Negative for cough, hemoptysis and shortness of breath.    Cardiovascular: Positive for leg swelling. Negative for chest pain.   Gastrointestinal: Positive for diarrhea. Negative for abdominal pain, constipation, heartburn and nausea.   Genitourinary: Negative for dysuria and hematuria.   Musculoskeletal: Negative for falls and joint pain.   Neurological: Negative for tingling, speech change, focal weakness, seizures and headaches.   Psychiatric/Behavioral: Negative for depression. The patient is not nervous/anxious.         Vital Signs: BP (!) 194/81 (BP Location: Right arm)   Pulse (!) 58   Resp 18    Wt 93.9 kg (207 lb)   BMI 30.57 kg/m²     ECOG Performance Status: 1 - Ambulates, capable of light work    Physical Exam  Vitals reviewed.   Constitutional:       Appearance: Normal appearance.   HENT:      Head: Normocephalic and atraumatic.   Eyes:      General: No scleral icterus.     Extraocular Movements: Extraocular movements intact.   Pulmonary:      Effort: Pulmonary effort is normal. No respiratory distress.   Abdominal:      General: There is no distension.   Musculoskeletal:      Cervical back: Neck supple.   Lymphadenopathy:      Cervical: No cervical adenopathy.   Skin:     General: Skin is warm and dry.   Neurological:      General: No focal deficit present.      Mental Status: He is alert and oriented to person, place, and time.      Cranial Nerves: No cranial nerve deficit.   Psychiatric:         Mood and Affect: Mood normal.         Behavior: Behavior normal.         Judgment: Judgment normal.                - Thank you for allowing me to participate in the care of your patient.    Kaiden Parker MD, PhD

## 2022-05-09 PROBLEM — D69.6 THROMBOCYTOPENIA: Status: ACTIVE | Noted: 2022-05-09

## 2022-05-23 DIAGNOSIS — E78.5 HYPERLIPIDEMIA WITH TARGET LOW DENSITY LIPOPROTEIN (LDL) CHOLESTEROL LESS THAN 100 MG/DL: Primary | ICD-10-CM

## 2022-05-23 RX ORDER — ATORVASTATIN CALCIUM 80 MG/1
80 TABLET, FILM COATED ORAL NIGHTLY
Qty: 90 TABLET | Refills: 3 | Status: SHIPPED | OUTPATIENT
Start: 2022-05-23

## 2022-06-07 ENCOUNTER — HOSPITAL ENCOUNTER (INPATIENT)
Facility: HOSPITAL | Age: 72
LOS: 1 days | Discharge: HOME | DRG: 280 | End: 2022-06-09
Attending: EMERGENCY MEDICINE | Admitting: INTERNAL MEDICINE
Payer: MEDICARE

## 2022-06-07 DIAGNOSIS — R07.9 CHEST PAIN: ICD-10-CM

## 2022-06-07 DIAGNOSIS — I21.4 NSTEMI (NON-ST ELEVATION MYOCARDIAL INFARCTION): ICD-10-CM

## 2022-06-07 DIAGNOSIS — R07.89 OTHER CHEST PAIN: ICD-10-CM

## 2022-06-07 DIAGNOSIS — I50.9 CONGESTIVE HEART FAILURE, UNSPECIFIED HF CHRONICITY, UNSPECIFIED HEART FAILURE TYPE: ICD-10-CM

## 2022-06-07 DIAGNOSIS — I25.118 CORONARY ARTERY DISEASE OF NATIVE ARTERY OF NATIVE HEART WITH STABLE ANGINA PECTORIS: ICD-10-CM

## 2022-06-07 DIAGNOSIS — I50.23 ACUTE ON CHRONIC SYSTOLIC CHF (CONGESTIVE HEART FAILURE): ICD-10-CM

## 2022-06-07 DIAGNOSIS — I25.10 CAD (CORONARY ARTERY DISEASE): ICD-10-CM

## 2022-06-07 DIAGNOSIS — I21.4 NSTEMI (NON-ST ELEVATED MYOCARDIAL INFARCTION): Primary | ICD-10-CM

## 2022-06-07 LAB
ALBUMIN SERPL BCP-MCNC: 4.3 G/DL (ref 3.5–5.2)
ALP SERPL-CCNC: 86 U/L (ref 55–135)
ALT SERPL W/O P-5'-P-CCNC: 22 U/L (ref 10–44)
ANION GAP SERPL CALC-SCNC: 10 MMOL/L (ref 8–16)
AST SERPL-CCNC: 22 U/L (ref 10–40)
BASOPHILS # BLD AUTO: 0.09 K/UL (ref 0–0.2)
BASOPHILS NFR BLD: 1 % (ref 0–1.9)
BILIRUB SERPL-MCNC: 0.8 MG/DL (ref 0.1–1)
BNP SERPL-MCNC: 135 PG/ML (ref 0–99)
BUN SERPL-MCNC: 29 MG/DL (ref 8–23)
CALCIUM SERPL-MCNC: 9 MG/DL (ref 8.7–10.5)
CHLORIDE SERPL-SCNC: 108 MMOL/L (ref 95–110)
CO2 SERPL-SCNC: 19 MMOL/L (ref 23–29)
CREAT SERPL-MCNC: 1.7 MG/DL (ref 0.5–1.4)
DIFFERENTIAL METHOD: ABNORMAL
EOSINOPHIL # BLD AUTO: 0.4 K/UL (ref 0–0.5)
EOSINOPHIL NFR BLD: 4.9 % (ref 0–8)
ERYTHROCYTE [DISTWIDTH] IN BLOOD BY AUTOMATED COUNT: 15.1 % (ref 11.5–14.5)
EST. GFR  (AFRICAN AMERICAN): 45.2 ML/MIN/1.73 M^2
EST. GFR  (NON AFRICAN AMERICAN): 39.1 ML/MIN/1.73 M^2
GLUCOSE SERPL-MCNC: 200 MG/DL (ref 70–110)
HCT VFR BLD AUTO: 34.3 % (ref 40–54)
HGB BLD-MCNC: 11.5 G/DL (ref 14–18)
IMM GRANULOCYTES # BLD AUTO: 0.02 K/UL (ref 0–0.04)
IMM GRANULOCYTES NFR BLD AUTO: 0.2 % (ref 0–0.5)
LYMPHOCYTES # BLD AUTO: 0.9 K/UL (ref 1–4.8)
LYMPHOCYTES NFR BLD: 10.8 % (ref 18–48)
MAGNESIUM SERPL-MCNC: 1.8 MG/DL (ref 1.6–2.6)
MCH RBC QN AUTO: 30 PG (ref 27–31)
MCHC RBC AUTO-ENTMCNC: 33.5 G/DL (ref 32–36)
MCV RBC AUTO: 90 FL (ref 82–98)
MONOCYTES # BLD AUTO: 0.5 K/UL (ref 0.3–1)
MONOCYTES NFR BLD: 6 % (ref 4–15)
NEUTROPHILS # BLD AUTO: 6.7 K/UL (ref 1.8–7.7)
NEUTROPHILS NFR BLD: 77.1 % (ref 38–73)
NRBC BLD-RTO: 0 /100 WBC
PLATELET # BLD AUTO: 171 K/UL (ref 150–450)
PMV BLD AUTO: 10.3 FL (ref 9.2–12.9)
POTASSIUM SERPL-SCNC: 4.6 MMOL/L (ref 3.5–5.1)
PROT SERPL-MCNC: 7.2 G/DL (ref 6–8.4)
RBC # BLD AUTO: 3.83 M/UL (ref 4.6–6.2)
SARS-COV-2 RDRP RESP QL NAA+PROBE: NEGATIVE
SODIUM SERPL-SCNC: 137 MMOL/L (ref 136–145)
TROPONIN I SERPL DL<=0.01 NG/ML-MCNC: 0.04 NG/ML
WBC # BLD AUTO: 8.71 K/UL (ref 3.9–12.7)

## 2022-06-07 PROCEDURE — 85025 COMPLETE CBC W/AUTO DIFF WBC: CPT | Performed by: EMERGENCY MEDICINE

## 2022-06-07 PROCEDURE — 99900031 HC PATIENT EDUCATION (STAT)

## 2022-06-07 PROCEDURE — 63600175 PHARM REV CODE 636 W HCPCS: Performed by: EMERGENCY MEDICINE

## 2022-06-07 PROCEDURE — 25000003 PHARM REV CODE 250: Performed by: EMERGENCY MEDICINE

## 2022-06-07 PROCEDURE — 84484 ASSAY OF TROPONIN QUANT: CPT | Performed by: EMERGENCY MEDICINE

## 2022-06-07 PROCEDURE — 99900035 HC TECH TIME PER 15 MIN (STAT)

## 2022-06-07 PROCEDURE — 83880 ASSAY OF NATRIURETIC PEPTIDE: CPT | Performed by: EMERGENCY MEDICINE

## 2022-06-07 PROCEDURE — 93010 EKG 12-LEAD: ICD-10-PCS | Mod: ,,, | Performed by: INTERNAL MEDICINE

## 2022-06-07 PROCEDURE — U0002 COVID-19 LAB TEST NON-CDC: HCPCS | Performed by: EMERGENCY MEDICINE

## 2022-06-07 PROCEDURE — 83735 ASSAY OF MAGNESIUM: CPT | Performed by: EMERGENCY MEDICINE

## 2022-06-07 PROCEDURE — 80053 COMPREHEN METABOLIC PANEL: CPT | Performed by: EMERGENCY MEDICINE

## 2022-06-07 PROCEDURE — 96375 TX/PRO/DX INJ NEW DRUG ADDON: CPT

## 2022-06-07 PROCEDURE — 93010 ELECTROCARDIOGRAM REPORT: CPT | Mod: ,,, | Performed by: INTERNAL MEDICINE

## 2022-06-07 PROCEDURE — 93005 ELECTROCARDIOGRAM TRACING: CPT | Performed by: INTERNAL MEDICINE

## 2022-06-07 PROCEDURE — 99285 EMERGENCY DEPT VISIT HI MDM: CPT | Mod: 25

## 2022-06-07 PROCEDURE — 96374 THER/PROPH/DIAG INJ IV PUSH: CPT

## 2022-06-07 PROCEDURE — 94761 N-INVAS EAR/PLS OXIMETRY MLT: CPT

## 2022-06-07 PROCEDURE — 94660 CPAP INITIATION&MGMT: CPT

## 2022-06-07 RX ORDER — MORPHINE SULFATE 2 MG/ML
2 INJECTION, SOLUTION INTRAMUSCULAR; INTRAVENOUS
Status: DISCONTINUED | OUTPATIENT
Start: 2022-06-07 | End: 2022-06-07

## 2022-06-07 RX ORDER — ASPIRIN 325 MG
325 TABLET ORAL
Status: DISCONTINUED | OUTPATIENT
Start: 2022-06-07 | End: 2022-06-07

## 2022-06-07 RX ORDER — ONDANSETRON 2 MG/ML
4 INJECTION INTRAMUSCULAR; INTRAVENOUS
Status: DISCONTINUED | OUTPATIENT
Start: 2022-06-07 | End: 2022-06-07

## 2022-06-07 RX ORDER — FUROSEMIDE 10 MG/ML
60 INJECTION INTRAMUSCULAR; INTRAVENOUS
Status: COMPLETED | OUTPATIENT
Start: 2022-06-07 | End: 2022-06-07

## 2022-06-07 RX ORDER — MORPHINE SULFATE 2 MG/ML
2 INJECTION, SOLUTION INTRAMUSCULAR; INTRAVENOUS
Status: COMPLETED | OUTPATIENT
Start: 2022-06-07 | End: 2022-06-07

## 2022-06-07 RX ORDER — FUROSEMIDE 10 MG/ML
60 INJECTION INTRAMUSCULAR; INTRAVENOUS
Status: DISCONTINUED | OUTPATIENT
Start: 2022-06-07 | End: 2022-06-07

## 2022-06-07 RX ORDER — ONDANSETRON 2 MG/ML
4 INJECTION INTRAMUSCULAR; INTRAVENOUS
Status: COMPLETED | OUTPATIENT
Start: 2022-06-07 | End: 2022-06-07

## 2022-06-07 RX ADMIN — MORPHINE SULFATE 2 MG: 2 INJECTION, SOLUTION INTRAMUSCULAR; INTRAVENOUS at 10:06

## 2022-06-07 RX ADMIN — FUROSEMIDE 60 MG: 10 INJECTION, SOLUTION INTRAMUSCULAR; INTRAVENOUS at 10:06

## 2022-06-07 RX ADMIN — NITROGLYCERIN 1 INCH: 20 OINTMENT TOPICAL at 10:06

## 2022-06-07 RX ADMIN — ONDANSETRON 4 MG: 2 INJECTION INTRAMUSCULAR; INTRAVENOUS at 10:06

## 2022-06-08 ENCOUNTER — CLINICAL SUPPORT (OUTPATIENT)
Dept: CARDIOLOGY | Facility: HOSPITAL | Age: 72
DRG: 280 | End: 2022-06-08
Payer: MEDICARE

## 2022-06-08 VITALS — WEIGHT: 207.25 LBS | HEIGHT: 69 IN | BODY MASS INDEX: 30.7 KG/M2

## 2022-06-08 PROBLEM — I50.23 ACUTE ON CHRONIC SYSTOLIC CHF (CONGESTIVE HEART FAILURE): Status: ACTIVE | Noted: 2021-11-27

## 2022-06-08 LAB
APTT PPP: 26.1 SEC (ref 23.3–35.1)
APTT PPP: 26.8 SEC (ref 23.3–35.1)
BASOPHILS # BLD AUTO: 0.06 K/UL (ref 0–0.2)
BASOPHILS # BLD AUTO: 0.07 K/UL (ref 0–0.2)
BASOPHILS NFR BLD: 0.7 % (ref 0–1.9)
BASOPHILS NFR BLD: 0.9 % (ref 0–1.9)
BSA FOR ECHO PROCEDURE: 2.14 M2
DIFFERENTIAL METHOD: ABNORMAL
DIFFERENTIAL METHOD: ABNORMAL
EJECTION FRACTION: 40 %
EOSINOPHIL # BLD AUTO: 0.2 K/UL (ref 0–0.5)
EOSINOPHIL # BLD AUTO: 0.2 K/UL (ref 0–0.5)
EOSINOPHIL NFR BLD: 2.2 % (ref 0–8)
EOSINOPHIL NFR BLD: 2.2 % (ref 0–8)
ERYTHROCYTE [DISTWIDTH] IN BLOOD BY AUTOMATED COUNT: 15 % (ref 11.5–14.5)
ERYTHROCYTE [DISTWIDTH] IN BLOOD BY AUTOMATED COUNT: 15.2 % (ref 11.5–14.5)
FRACTIONAL SHORTENING: 16 % (ref 28–44)
GLUCOSE SERPL-MCNC: 154 MG/DL (ref 70–110)
GLUCOSE SERPL-MCNC: 242 MG/DL (ref 70–110)
GLUCOSE SERPL-MCNC: 256 MG/DL (ref 70–110)
HCT VFR BLD AUTO: 33.1 % (ref 40–54)
HCT VFR BLD AUTO: 34.8 % (ref 40–54)
HGB BLD-MCNC: 11.1 G/DL (ref 14–18)
HGB BLD-MCNC: 11.5 G/DL (ref 14–18)
IMM GRANULOCYTES # BLD AUTO: 0.03 K/UL (ref 0–0.04)
IMM GRANULOCYTES # BLD AUTO: 0.03 K/UL (ref 0–0.04)
IMM GRANULOCYTES NFR BLD AUTO: 0.3 % (ref 0–0.5)
IMM GRANULOCYTES NFR BLD AUTO: 0.4 % (ref 0–0.5)
INR PPP: 1.3
INR PPP: 1.3
LEFT INTERNAL DIMENSION IN SYSTOLE: 4.1 CM (ref 2.1–4)
LEFT VENTRICLE DIASTOLIC VOLUME INDEX: 55.18 ML/M2
LEFT VENTRICLE DIASTOLIC VOLUME: 115.88 ML
LEFT VENTRICLE SYSTOLIC VOLUME INDEX: 32.9 ML/M2
LEFT VENTRICLE SYSTOLIC VOLUME: 69.07 ML
LEFT VENTRICULAR INTERNAL DIMENSION IN DIASTOLE: 4.88 CM (ref 3.5–6)
LYMPHOCYTES # BLD AUTO: 0.9 K/UL (ref 1–4.8)
LYMPHOCYTES # BLD AUTO: 1.1 K/UL (ref 1–4.8)
LYMPHOCYTES NFR BLD: 11.8 % (ref 18–48)
LYMPHOCYTES NFR BLD: 12.2 % (ref 18–48)
MCH RBC QN AUTO: 29.4 PG (ref 27–31)
MCH RBC QN AUTO: 30.2 PG (ref 27–31)
MCHC RBC AUTO-ENTMCNC: 33 G/DL (ref 32–36)
MCHC RBC AUTO-ENTMCNC: 33.5 G/DL (ref 32–36)
MCV RBC AUTO: 89 FL (ref 82–98)
MCV RBC AUTO: 90 FL (ref 82–98)
MONOCYTES # BLD AUTO: 0.6 K/UL (ref 0.3–1)
MONOCYTES # BLD AUTO: 0.7 K/UL (ref 0.3–1)
MONOCYTES NFR BLD: 6.6 % (ref 4–15)
MONOCYTES NFR BLD: 9.7 % (ref 4–15)
NEUTROPHILS # BLD AUTO: 5.7 K/UL (ref 1.8–7.7)
NEUTROPHILS # BLD AUTO: 6.7 K/UL (ref 1.8–7.7)
NEUTROPHILS NFR BLD: 75 % (ref 38–73)
NEUTROPHILS NFR BLD: 78 % (ref 38–73)
NRBC BLD-RTO: 0 /100 WBC
NRBC BLD-RTO: 0 /100 WBC
PLATELET # BLD AUTO: 151 K/UL (ref 150–450)
PLATELET # BLD AUTO: 155 K/UL (ref 150–450)
PMV BLD AUTO: 9.8 FL (ref 9.2–12.9)
PMV BLD AUTO: 9.9 FL (ref 9.2–12.9)
PROTHROMBIN TIME: 15 SEC (ref 11.4–13.7)
PROTHROMBIN TIME: 15.6 SEC (ref 11.4–13.7)
RA PRESSURE: 3 MMHG
RBC # BLD AUTO: 3.67 M/UL (ref 4.6–6.2)
RBC # BLD AUTO: 3.91 M/UL (ref 4.6–6.2)
TROPONIN I SERPL DL<=0.01 NG/ML-MCNC: 1.48 NG/ML
TROPONIN I SERPL DL<=0.01 NG/ML-MCNC: 3.24 NG/ML
TROPONIN I SERPL DL<=0.01 NG/ML-MCNC: 4.21 NG/ML
WBC # BLD AUTO: 7.61 K/UL (ref 3.9–12.7)
WBC # BLD AUTO: 8.63 K/UL (ref 3.9–12.7)

## 2022-06-08 PROCEDURE — 85025 COMPLETE CBC W/AUTO DIFF WBC: CPT | Mod: 91 | Performed by: INTERNAL MEDICINE

## 2022-06-08 PROCEDURE — 93308 TTE F-UP OR LMTD: CPT | Mod: 26,,, | Performed by: INTERNAL MEDICINE

## 2022-06-08 PROCEDURE — 84484 ASSAY OF TROPONIN QUANT: CPT | Mod: 91 | Performed by: NURSE PRACTITIONER

## 2022-06-08 PROCEDURE — C9113 INJ PANTOPRAZOLE SODIUM, VIA: HCPCS | Performed by: INTERNAL MEDICINE

## 2022-06-08 PROCEDURE — 85730 THROMBOPLASTIN TIME PARTIAL: CPT | Performed by: INTERNAL MEDICINE

## 2022-06-08 PROCEDURE — 36415 COLL VENOUS BLD VENIPUNCTURE: CPT | Performed by: NURSE PRACTITIONER

## 2022-06-08 PROCEDURE — 25000003 PHARM REV CODE 250

## 2022-06-08 PROCEDURE — 63600175 PHARM REV CODE 636 W HCPCS: Performed by: INTERNAL MEDICINE

## 2022-06-08 PROCEDURE — 36415 COLL VENOUS BLD VENIPUNCTURE: CPT | Performed by: INTERNAL MEDICINE

## 2022-06-08 PROCEDURE — 25000003 PHARM REV CODE 250: Performed by: NURSE PRACTITIONER

## 2022-06-08 PROCEDURE — 99900035 HC TECH TIME PER 15 MIN (STAT)

## 2022-06-08 PROCEDURE — 25000003 PHARM REV CODE 250: Performed by: INTERNAL MEDICINE

## 2022-06-08 PROCEDURE — 85610 PROTHROMBIN TIME: CPT | Mod: 91 | Performed by: INTERNAL MEDICINE

## 2022-06-08 PROCEDURE — 85025 COMPLETE CBC W/AUTO DIFF WBC: CPT | Performed by: INTERNAL MEDICINE

## 2022-06-08 PROCEDURE — 21000000 HC CCU ICU ROOM CHARGE

## 2022-06-08 PROCEDURE — 93308 TTE F-UP OR LMTD: CPT

## 2022-06-08 PROCEDURE — 93308 ECHO (CUPID ONLY): ICD-10-PCS | Mod: 26,,, | Performed by: INTERNAL MEDICINE

## 2022-06-08 PROCEDURE — 63600175 PHARM REV CODE 636 W HCPCS: Performed by: NURSE PRACTITIONER

## 2022-06-08 PROCEDURE — 85610 PROTHROMBIN TIME: CPT | Performed by: INTERNAL MEDICINE

## 2022-06-08 PROCEDURE — 85730 THROMBOPLASTIN TIME PARTIAL: CPT | Mod: 91 | Performed by: INTERNAL MEDICINE

## 2022-06-08 PROCEDURE — 94761 N-INVAS EAR/PLS OXIMETRY MLT: CPT

## 2022-06-08 RX ORDER — CHLORHEXIDINE GLUCONATE ORAL RINSE 1.2 MG/ML
15 SOLUTION DENTAL 2 TIMES DAILY
Status: DISCONTINUED | OUTPATIENT
Start: 2022-06-08 | End: 2022-06-09 | Stop reason: HOSPADM

## 2022-06-08 RX ORDER — POTASSIUM CHLORIDE 7.45 MG/ML
20 INJECTION INTRAVENOUS
Status: DISCONTINUED | OUTPATIENT
Start: 2022-06-08 | End: 2022-06-09 | Stop reason: HOSPADM

## 2022-06-08 RX ORDER — LANOLIN ALCOHOL/MO/W.PET/CERES
800 CREAM (GRAM) TOPICAL
Status: DISCONTINUED | OUTPATIENT
Start: 2022-06-08 | End: 2022-06-09 | Stop reason: HOSPADM

## 2022-06-08 RX ORDER — MAGNESIUM SULFATE 1 G/100ML
1 INJECTION INTRAVENOUS
Status: DISCONTINUED | OUTPATIENT
Start: 2022-06-08 | End: 2022-06-09 | Stop reason: HOSPADM

## 2022-06-08 RX ORDER — HEPARIN SODIUM,PORCINE/D5W 25000/250
0-40 INTRAVENOUS SOLUTION INTRAVENOUS CONTINUOUS
Status: DISCONTINUED | OUTPATIENT
Start: 2022-06-08 | End: 2022-06-08

## 2022-06-08 RX ORDER — ONDANSETRON 4 MG/1
8 TABLET, ORALLY DISINTEGRATING ORAL EVERY 8 HOURS PRN
Status: DISCONTINUED | OUTPATIENT
Start: 2022-06-08 | End: 2022-06-09 | Stop reason: HOSPADM

## 2022-06-08 RX ORDER — INSULIN ASPART 100 [IU]/ML
0-5 INJECTION, SOLUTION INTRAVENOUS; SUBCUTANEOUS
Status: DISCONTINUED | OUTPATIENT
Start: 2022-06-08 | End: 2022-06-09 | Stop reason: HOSPADM

## 2022-06-08 RX ORDER — POTASSIUM CHLORIDE 7.45 MG/ML
40 INJECTION INTRAVENOUS
Status: DISCONTINUED | OUTPATIENT
Start: 2022-06-08 | End: 2022-06-09 | Stop reason: HOSPADM

## 2022-06-08 RX ORDER — CARVEDILOL 12.5 MG/1
12.5 TABLET ORAL 2 TIMES DAILY WITH MEALS
Status: DISCONTINUED | OUTPATIENT
Start: 2022-06-08 | End: 2022-06-09 | Stop reason: HOSPADM

## 2022-06-08 RX ORDER — TALC
6 POWDER (GRAM) TOPICAL NIGHTLY PRN
Status: DISCONTINUED | OUTPATIENT
Start: 2022-06-08 | End: 2022-06-09 | Stop reason: HOSPADM

## 2022-06-08 RX ORDER — POTASSIUM CHLORIDE 20 MEQ/1
40 TABLET, EXTENDED RELEASE ORAL
Status: DISCONTINUED | OUTPATIENT
Start: 2022-06-08 | End: 2022-06-09 | Stop reason: HOSPADM

## 2022-06-08 RX ORDER — ASPIRIN 81 MG/1
81 TABLET ORAL DAILY
Status: DISCONTINUED | OUTPATIENT
Start: 2022-06-08 | End: 2022-06-09 | Stop reason: HOSPADM

## 2022-06-08 RX ORDER — AMOXICILLIN 250 MG
1 CAPSULE ORAL 2 TIMES DAILY PRN
Status: DISCONTINUED | OUTPATIENT
Start: 2022-06-08 | End: 2022-06-09 | Stop reason: HOSPADM

## 2022-06-08 RX ORDER — PANTOPRAZOLE SODIUM 40 MG/10ML
40 INJECTION, POWDER, LYOPHILIZED, FOR SOLUTION INTRAVENOUS DAILY
Status: DISCONTINUED | OUTPATIENT
Start: 2022-06-08 | End: 2022-06-09 | Stop reason: HOSPADM

## 2022-06-08 RX ORDER — VALSARTAN 80 MG/1
80 TABLET ORAL DAILY
Status: DISCONTINUED | OUTPATIENT
Start: 2022-06-08 | End: 2022-06-08

## 2022-06-08 RX ORDER — HYDRALAZINE HYDROCHLORIDE 25 MG/1
25 TABLET, FILM COATED ORAL EVERY 8 HOURS
Status: DISCONTINUED | OUTPATIENT
Start: 2022-06-08 | End: 2022-06-09 | Stop reason: HOSPADM

## 2022-06-08 RX ORDER — FUROSEMIDE 10 MG/ML
40 INJECTION INTRAMUSCULAR; INTRAVENOUS
Status: COMPLETED | OUTPATIENT
Start: 2022-06-08 | End: 2022-06-08

## 2022-06-08 RX ORDER — MUPIROCIN 20 MG/G
OINTMENT TOPICAL 2 TIMES DAILY
Status: DISCONTINUED | OUTPATIENT
Start: 2022-06-08 | End: 2022-06-09 | Stop reason: HOSPADM

## 2022-06-08 RX ORDER — ISOSORBIDE DINITRATE 10 MG/1
40 TABLET ORAL 3 TIMES DAILY
Status: DISCONTINUED | OUTPATIENT
Start: 2022-06-08 | End: 2022-06-09 | Stop reason: HOSPADM

## 2022-06-08 RX ORDER — HEPARIN SODIUM,PORCINE/D5W 25000/250
12 INTRAVENOUS SOLUTION INTRAVENOUS CONTINUOUS
Status: DISCONTINUED | OUTPATIENT
Start: 2022-06-08 | End: 2022-06-09

## 2022-06-08 RX ORDER — SODIUM CHLORIDE 0.9 % (FLUSH) 0.9 %
10 SYRINGE (ML) INJECTION
Status: DISCONTINUED | OUTPATIENT
Start: 2022-06-08 | End: 2022-06-09 | Stop reason: HOSPADM

## 2022-06-08 RX ORDER — GLUCAGON 1 MG
1 KIT INJECTION
Status: DISCONTINUED | OUTPATIENT
Start: 2022-06-08 | End: 2022-06-09 | Stop reason: HOSPADM

## 2022-06-08 RX ORDER — ENOXAPARIN SODIUM 100 MG/ML
40 INJECTION SUBCUTANEOUS
Status: DISCONTINUED | OUTPATIENT
Start: 2022-06-08 | End: 2022-06-08

## 2022-06-08 RX ORDER — IBUPROFEN 200 MG
24 TABLET ORAL
Status: DISCONTINUED | OUTPATIENT
Start: 2022-06-08 | End: 2022-06-09 | Stop reason: HOSPADM

## 2022-06-08 RX ORDER — ATORVASTATIN CALCIUM 40 MG/1
80 TABLET, FILM COATED ORAL NIGHTLY
Status: DISCONTINUED | OUTPATIENT
Start: 2022-06-08 | End: 2022-06-09 | Stop reason: HOSPADM

## 2022-06-08 RX ORDER — MAGNESIUM SULFATE HEPTAHYDRATE 40 MG/ML
2 INJECTION, SOLUTION INTRAVENOUS
Status: DISCONTINUED | OUTPATIENT
Start: 2022-06-08 | End: 2022-06-09 | Stop reason: HOSPADM

## 2022-06-08 RX ORDER — LOPERAMIDE HYDROCHLORIDE 2 MG/1
2 CAPSULE ORAL
Status: DISCONTINUED | OUTPATIENT
Start: 2022-06-08 | End: 2022-06-09 | Stop reason: HOSPADM

## 2022-06-08 RX ORDER — POTASSIUM CHLORIDE 20 MEQ/1
20 TABLET, EXTENDED RELEASE ORAL
Status: DISCONTINUED | OUTPATIENT
Start: 2022-06-08 | End: 2022-06-09 | Stop reason: HOSPADM

## 2022-06-08 RX ORDER — IBUPROFEN 200 MG
16 TABLET ORAL
Status: DISCONTINUED | OUTPATIENT
Start: 2022-06-08 | End: 2022-06-09 | Stop reason: HOSPADM

## 2022-06-08 RX ORDER — MAGNESIUM SULFATE HEPTAHYDRATE 40 MG/ML
4 INJECTION, SOLUTION INTRAVENOUS
Status: DISCONTINUED | OUTPATIENT
Start: 2022-06-08 | End: 2022-06-09 | Stop reason: HOSPADM

## 2022-06-08 RX ORDER — ACETAMINOPHEN 325 MG/1
650 TABLET ORAL EVERY 4 HOURS PRN
Status: DISCONTINUED | OUTPATIENT
Start: 2022-06-08 | End: 2022-06-09 | Stop reason: HOSPADM

## 2022-06-08 RX ADMIN — INSULIN ASPART 3 UNITS: 100 INJECTION, SOLUTION INTRAVENOUS; SUBCUTANEOUS at 05:06

## 2022-06-08 RX ADMIN — SACUBITRIL AND VALSARTAN 1 TABLET: 24; 26 TABLET, FILM COATED ORAL at 04:06

## 2022-06-08 RX ADMIN — THERA TABS 1 TABLET: TAB at 08:06

## 2022-06-08 RX ADMIN — FUROSEMIDE 40 MG: 10 INJECTION, SOLUTION INTRAMUSCULAR; INTRAVENOUS at 10:06

## 2022-06-08 RX ADMIN — MUPIROCIN: 20 OINTMENT TOPICAL at 08:06

## 2022-06-08 RX ADMIN — TICAGRELOR 90 MG: 90 TABLET ORAL at 08:06

## 2022-06-08 RX ADMIN — ISOSORBIDE DINITRATE 40 MG: 10 TABLET ORAL at 08:06

## 2022-06-08 RX ADMIN — INSULIN ASPART 1 UNITS: 100 INJECTION, SOLUTION INTRAVENOUS; SUBCUTANEOUS at 08:06

## 2022-06-08 RX ADMIN — ASPIRIN 81 MG: 81 TABLET, COATED ORAL at 08:06

## 2022-06-08 RX ADMIN — CHLORHEXIDINE GLUCONATE 15 ML: 1.2 RINSE ORAL at 08:06

## 2022-06-08 RX ADMIN — ATORVASTATIN CALCIUM 80 MG: 40 TABLET, FILM COATED ORAL at 08:06

## 2022-06-08 RX ADMIN — CARVEDILOL 12.5 MG: 12.5 TABLET, FILM COATED ORAL at 05:06

## 2022-06-08 RX ADMIN — HEPARIN SODIUM 12 UNITS/KG/HR: 10000 INJECTION, SOLUTION INTRAVENOUS at 06:06

## 2022-06-08 RX ADMIN — PANTOPRAZOLE SODIUM 40 MG: 40 INJECTION, POWDER, FOR SOLUTION INTRAVENOUS at 06:06

## 2022-06-08 RX ADMIN — FUROSEMIDE 40 MG: 10 INJECTION, SOLUTION INTRAMUSCULAR; INTRAVENOUS at 11:06

## 2022-06-08 RX ADMIN — CARVEDILOL 12.5 MG: 12.5 TABLET, FILM COATED ORAL at 07:06

## 2022-06-08 RX ADMIN — VALSARTAN 80 MG: 80 TABLET, FILM COATED ORAL at 08:06

## 2022-06-08 RX ADMIN — HYDRALAZINE HYDROCHLORIDE 25 MG: 25 TABLET ORAL at 02:06

## 2022-06-08 RX ADMIN — HYDRALAZINE HYDROCHLORIDE 25 MG: 25 TABLET ORAL at 10:06

## 2022-06-08 RX ADMIN — ISOSORBIDE DINITRATE 40 MG: 10 TABLET ORAL at 02:06

## 2022-06-08 RX ADMIN — HYDRALAZINE HYDROCHLORIDE 25 MG: 25 TABLET ORAL at 06:06

## 2022-06-08 NOTE — CONSULTS
"[facility]  Adult Nutrition   Consult Note (Initial Assessment)     SUMMARY     Recommendations/Interventions:    Recommendation/Intervention: 1. Continue current diet as tolerated, encourage intake. 2. Will Diabetic/Cardiac Diet education tomorrow. 3.  to assist in meal choices daily.  Goals: 1. Patient to meet at least 75% of estimated needs through meal intake. 2. Patient to show understanding of diet education.  Nutrition Goal Status: new    Dietitian Rounds Brief:  · Consult for diet education. Patient presented with chest pain and SOB. Admitted with CHF exacerbation. Will provide diet education tomorrow once diet advances.   Reason for Assessment  Reason For Assessment: consult  Diagnosis: cardiac disease  Relevant Medical History: CAD, T2DM, HTN, CHF, HLD, DVT, NSTEMI, CKD 3  Interdisciplinary Rounds: attended    Nutrition Risk Screen  Nutrition Risk Screen: no indicators present    Nutrition/Diet History  Food Allergies: NKFA  Factors Affecting Nutritional Intake: None identified at this time    Anthropometrics  Temp: 98.7 °F (37.1 °C)  Height: 5' 9" (175.3 cm)  Height (inches): 69 in  Weight Method: Bed Scale  Weight: 94 kg (207 lb 3.7 oz)  Weight (lb): 207.23 lb  Ideal Body Weight (IBW), Male: 160 lb  % Ideal Body Weight, Male (lb): 129.52 %  BMI (Calculated): 30.6  BMI Grade: 30 - 34.9- obesity - grade I       Weight History:  Wt Readings from Last 10 Encounters:   06/08/22 94 kg (207 lb 3.7 oz)   05/05/22 93.9 kg (207 lb)   05/05/22 94 kg (207 lb 3.7 oz)   04/21/22 93.2 kg (205 lb 7.5 oz)   04/19/22 91.5 kg (201 lb 11.5 oz)   04/01/22 98 kg (216 lb 0.8 oz)   01/06/22 101.6 kg (223 lb 15.8 oz)   12/13/21 100.7 kg (222 lb)   11/30/21 100.5 kg (221 lb 9 oz)   11/26/21 99.8 kg (220 lb)     Lab/Procedures/Meds: Pertinent Labs Reviewed  Clinical Chemistry:  Recent Labs   Lab 06/07/22  2207      K 4.6      CO2 19*   *   BUN 29*   CREATININE 1.7*   CALCIUM 9.0   PROT 7.2 "   ALBUMIN 4.3   BILITOT 0.8   ALKPHOS 86   AST 22   ALT 22   ANIONGAP 10   ESTGFRAFRICA 45.2*   EGFRNONAA 39.1*   MG 1.8     CBC:   Recent Labs   Lab 06/08/22  0537   WBC 7.61   RBC 3.67*   HGB 11.1*   HCT 33.1*      MCV 90   MCH 30.2   MCHC 33.5     Cardiac Profile:  Recent Labs   Lab 06/07/22  2207 06/08/22  0354   *  --    TROPONINI 0.045* 1.479*     Medications: Pertinent Medications reviewed  Scheduled Meds:   aspirin  81 mg Oral Daily    atorvastatin  80 mg Oral QHS    carvediloL  12.5 mg Oral BID WM    furosemide (LASIX) injection  40 mg Intravenous Q12H    heparin (PORCINE)  50.8 Units/kg (Adjusted) Intravenous Once    hydrALAZINE  25 mg Oral Q8H    isosorbide dinitrate  40 mg Oral TID    multivitamin  1 tablet Oral Daily    pantoprazole  40 mg Intravenous Daily    ticagrelor  90 mg Oral BID    valsartan  80 mg Oral Daily     Continuous Infusions:   heparin (porcine) in D5W       PRN Meds:.acetaminophen, calcium chloride IVPB, calcium chloride IVPB, calcium chloride IVPB, dextrose 50%, dextrose 50%, glucagon (human recombinant), glucose, glucose, heparin (PORCINE), heparin (PORCINE), insulin aspart U-100, loperamide, magnesium oxide, magnesium sulfate IVPB, magnesium sulfate IVPB, magnesium sulfate IVPB, magnesium sulfate IVPB, melatonin, ondansetron, potassium chloride in water, potassium chloride in water, potassium chloride in water, potassium chloride in water, potassium chloride, potassium chloride, potassium chloride, potassium chloride, senna-docusate 8.6-50 mg, sodium chloride 0.9%, sodium phosphate IVPB, sodium phosphate IVPB, sodium phosphate IVPB, sodium phosphate IVPB, sodium phosphate IVPB    Antibiotics (From admission, onward)            None           Estimated/Assessed Needs  Weight Used For Calorie Calculations: 94 kg (207 lb 3.7 oz)  Energy Calorie Requirements (kcal): 4260-6126 kcals/day (20-25 kcals/kg)  Energy Need Method: Kcal/kg  Protein Requirements:   g/day (1.0-1.3 g/kg ABW 2' CKD 3)  Weight Used For Protein Calculations: 77 kg (169 lb 12.1 oz) (ADjusted body weight 2' CKD)  Fluid Requirements (mL): 1 mL/kcal or per MD     RDA Method (mL): 1880       Nutrition Prescription Ordered    Current Diet Order: 1800 kcal Diabetic/Low Sodium Diet    Evaluation of Received Nutrient/Fluid Intake    Energy Calories Required: not meeting needs  Protein Required: not meeting needs  Fluid Required: meeting needs  Tolerance: tolerating  % Intake of Estimated Energy Needs: 0%  % Meal Intake: NPO  No intake or output data in the 24 hours ending 06/08/22 0740   Nutrition Risk    Level of Risk/Frequency of Follow-up: moderate - high   Monitor and Evaluation    Food and Nutrient Intake: energy intake, food and beverage intake  Food and Nutrient Adminstration: diet order  Knowledge/Beliefs/Attitudes: food and nutrition knowledge/skill, beliefs and attitudes  Physical Activity and Function: nutrition-related ADLs and IADLs, factors affecting access to physical activity  Anthropometric Measurements: weight, weight change, body mass index  Biochemical Data, Medical Tests and Procedures: electrolyte and renal panel, lipid profile, gastrointestinal profile, glucose/endocrine profile, inflammatory profile  Nutrition-Focused Physical Findings: overall appearance     Nutrition Follow-Up    RD Follow-up?: Yes  Kathy Barroso RD 06/08/2022 10:38 AM

## 2022-06-08 NOTE — PROGRESS NOTES
Mr Levin has a troponin > 1  A)  NSTEMI  P)  Admit to progressive care  Heparin drip with nomogram  Cardiology consulted  Protonix 40 mg IVP Q 24  Continue NPO

## 2022-06-08 NOTE — H&P
Formerly [facility] Medicine History & Physical Examination   Patient Name: Amadeo Levin  MRN: [id number]  Patient Class: OP- Observation   Admission Date: 6/7/2022  9:59 PM  Length of Stay: 0  Attending Physician: Marcos Willett MD   Primary Care Provider: Angelito Adams MD  Face-to-Face encounter date: 06/08/2022  Code Status: full code  Chief Complaint: Chest Pain (Chest pain.  Given 3 ntg and 325 mg asa pta)          Patient information was obtained from patient, past medical records and ER records.   HISTORY OF PRESENT ILLNESS:   History was obtained from the patient and ER physician Sign-out. Patient is a 71 year old male with history of HTN, DM, CKD, CAD, CHF who presents to the ED with the complaint of chest pain with associated SOB. He reports progressive SOB over the past few days. He also reports pedal edema, orthopnea, PND. He describes midsternal chest discomfort that does not radiate. Symptoms became worse today so he called EMS. He was given ASA 325mg and Nitro SL x3 by EMS. Upon arrival to ED patient was found to be SOB with wet lung sounds so he was placed on BiPAP, IV lasix and nitro paste with improvement in his symptoms.     Of note, he was hospitalized 2 months ago for NSTEMI. He had heart cath that revealed stenosis of previous stent. Intervention was done by Dr. Vernon but patient reports he was referred to Dr. Vernon at [facility] and is scheduled to have repeat heart cath to get laser of scar tissue. He reports he has been compliant with all of his medications including ASA/Brilinta.    Patient denies fever, chills, nausea, vomiting, abdominal pain, dysuria, diarrhea, melena, rectal bleeding, numbness, tingling or LOC.     In the ED patient is afebrile. He is hypertensive 210/98. Other vitals stable. CBC with normal WBC. H/H stable 11.5/34.3  CMP with CO2 19, glucose 200. Bun/creat consistent with CKD and appears to be at patient's baseline.  BNP is mildly elevated 135 and Troponin  is elevated 0.045 but improved from previous of 9 1 month ago.  EKG shows NSR and LBBB similar to previous.  CXR shows cardiomegaly with evidence of pulmonary edema. Official read pending. After treatment in the ED the patient states he feels better and is taken off of the bipap and tolerates NC.       REVIEW OF SYSTEMS:   10 Point Review of System was performed and was found to be negative except for that mentioned already in the HPI above.     PAST MEDICAL HISTORY:     Past Medical History:   Diagnosis Date    Coronary artery disease     Diabetes mellitus type II     Hypertension        PAST SURGICAL HISTORY:     Past Surgical History:   Procedure Laterality Date    ANGIOGRAM, CORONARY, WITH LEFT HEART CATHETERIZATION N/A 11/27/2021    Procedure: Angiogram, Coronary, with Left Heart Cath;  Surgeon: Dave Vernon MD;  Location: [facility] CATH/EP LAB;  Service: Cardiology;  Laterality: N/A;    ANGIOGRAM, CORONARY, WITH LEFT HEART CATHETERIZATION N/A 4/18/2022    Procedure: Angiogram, Coronary, with Left Heart Cath;  Surgeon: Dave Vernon MD;  Location: [facility] CATH/EP LAB;  Service: Cardiology;  Laterality: N/A;    APPENDECTOMY      CORONARY ANGIOGRAPHY Left 11/30/2021    Procedure: ANGIOGRAM, CORONARY ARTERY;  Surgeon: Gunnar Vernon MD;  Location: [facility] CATH LAB;  Service: Cardiology;  Laterality: Left;    HERNIA REPAIR      LEFT HEART CATHETERIZATION Left 4/20/2022    Procedure: Left heart cath;  Surgeon: Dave Vernon MD;  Location: [facility] CATH/EP LAB;  Service: Cardiology;  Laterality: Left;    PERCUTANEOUS TRANSLUMINAL ANGIOPLASTY N/A 4/20/2022    Procedure: PTA (ANGIOPLASTY, PERCUTANEOUS, TRANSLUMINAL);  Surgeon: Dave Vernon MD;  Location: [facility] CATH/EP LAB;  Service: Cardiology;  Laterality: N/A;    stents         ALLERGIES:   Morphine and Vicodin [hydrocodone-acetaminophen]    FAMILY HISTORY:     Family History   Problem Relation Age of Onset    Diabetes Mother     Cancer Neg Hx     Macular  degeneration Neg Hx     Retinal detachment Neg Hx     Glaucoma Neg Hx        SOCIAL HISTORY:     Social History     Tobacco Use    Smoking status: Never Smoker    Smokeless tobacco: Never Used   Substance Use Topics    Alcohol use: Not Currently     Comment: 2 a year        Social History     Substance and Sexual Activity   Sexual Activity Not Currently    Partners: Female        HOME MEDICATIONS:     Prior to Admission medications    Medication Sig Start Date End Date Taking? Authorizing Provider   aspirin (ECOTRIN) 81 MG EC tablet Take 1 tablet (81 mg total) by mouth once daily. 12/1/21  Yes Mark Walker MD   atorvastatin (LIPITOR) 80 MG tablet Take 1 tablet (80 mg total) by mouth every evening. 5/23/22  Yes Gunnar Vernon MD   carvediloL (COREG) 12.5 MG tablet Take 1 tablet (12.5 mg total) by mouth 2 (two) times daily with meals. 2/22/22  Yes Gunnar Vernon MD   glipiZIDE (GLUCOTROL) 10 MG tablet Take 1 tablet (10 mg total) by mouth 2 (two) times daily with meals.  Patient taking differently: Take by mouth 2 (two) times daily with meals. 4/1/22  Yes Angelito Adams MD   hydrALAZINE (APRESOLINE) 25 MG tablet Take 1 tablet (25 mg total) by mouth every 8 (eight) hours. 4/21/22 4/21/23 Yes Josie Cha MD   isosorbide dinitrate (ISORDIL) 20 MG tablet Take 2 tablets (40 mg total) by mouth 3 (three) times daily. 4/21/22 4/21/23 Yes Josie Cha MD   metFORMIN (GLUCOPHAGE) 1000 MG tablet Take 1 tablet (1,000 mg total) by mouth 2 (two) times daily with meals. Please hold medication for 2 more days. Can restart on 12/3/21  Patient taking differently: Take 1,000 mg by mouth 2 (two) times daily with meals. 4/4/22  Yes Angelito Adams MD   MULTIVITAMIN W-MINERALS/LUTEIN (CENTRUM SILVER ORAL) Take 1 capsule by mouth once daily.   Yes Historical Provider   SITagliptin (JANUVIA) 100 MG Tab Take 1 tablet (100 mg total) by mouth once daily. 12/1/21 12/1/22 Yes Wally Nunez MD    ticagrelor (BRILINTA) 90 mg tablet Take 1 tablet (90 mg total) by mouth 2 (two) times a day. 4/21/22 4/21/23 Yes Josie Cha MD   valsartan (DIOVAN) 80 MG tablet Take 80 mg by mouth once daily.   Yes Historical Provider   alcohol swabs (BD ALCOHOL SWABS) PadM Apply 1 each topically as needed. 3/10/15   Fernando Ramírez MD   blood sugar diagnostic (ACCU-CHEK OLGA PLUS TEST STRP) Strp 1 each by Misc.(Non-Drug; Combo Route) route daily as needed. 4/21/15   Fernando Ramírez MD   blood sugar diagnostic Strp To check BG 2 times daily, to use with insurance preferred meter 3/1/21   Kavita Gill NP   lancets Misc To check BG 2 times daily, to use with insurance preferred meter 4/9/20   Kavita Gill NP   clopidogreL (PLAVIX) 75 mg tablet Take 1 tablet (75 mg total) by mouth once daily. 1/6/22 4/21/22  Kavita Gill NP         PHYSICAL EXAM:   BP (!) 143/64   Pulse 71   Temp 98.9 °F (37.2 °C) (Oral)   Resp 20   Wt 90.7 kg (200 lb)   SpO2 100%   BMI 29.53 kg/m²   Vitals Reviewed  General appearance: Well-developed, overweight,  White male   Skin: No Rash. Warm, dry. Chronic dark discoloration to LLE  Neuro: AAOx3. Follows commands. Motor and sensory exams grossly intact. Good tone. Power in all 4 extremities 5/5.   HENT: Atraumatic head. Moist mucous membranes of oral cavity.  Eyes: Normal extraocular movements.   Neck: Supple. No evidence of lymphadenopathy. No thyroidomegaly.  Lungs: Tachypnea. Clear to auscultation bilaterally. No wheezing present.   Heart: Regular rate and rhythm. S1 and S2 present with no murmurs/gallop/rub. +2 pedal edema. No JVD present.   Abdomen: Soft, non-distended, non-tender. No rebound tenderness/guarding. No masses or organomegaly. Bowel sounds are normal. Bladder is not palpable.   Extremities: capillary refill less than 2 seconds.   Psych/mental status: Alert and oriented. Cooperative. Responds appropriately to questions.   EMERGENCY DEPARTMENT LABS AND IMAGING:     Labs  Reviewed   CBC W/ AUTO DIFFERENTIAL - Abnormal; Notable for the following components:       Result Value    RBC 3.83 (*)     Hemoglobin 11.5 (*)     Hematocrit 34.3 (*)     RDW 15.1 (*)     Lymph # 0.9 (*)     Gran % 77.1 (*)     Lymph % 10.8 (*)     All other components within normal limits   COMPREHENSIVE METABOLIC PANEL - Abnormal; Notable for the following components:    CO2 19 (*)     Glucose 200 (*)     BUN 29 (*)     Creatinine 1.7 (*)     eGFR if  45.2 (*)     eGFR if non  39.1 (*)     All other components within normal limits   TROPONIN I - Abnormal; Notable for the following components:    Troponin I 0.045 (*)     All other components within normal limits   B-TYPE NATRIURETIC PEPTIDE - Abnormal; Notable for the following components:     (*)     All other components within normal limits   MAGNESIUM   SARS-COV-2 RNA AMPLIFICATION, QUAL   TROPONIN I   POCT GLUCOSE MONITORING CONTINUOUS       X-Ray Chest AP Portable    (Results Pending)       ASSESSMENT & PLAN:   Amadeo Levin is a 71 y.o. male admitted for    Active Hospital Problems    Diagnosis  POA    *Acute on chronic systolic CHF (congestive heart failure) [I50.23]  Unknown    Hypertensive emergency [I16.1]  Yes    Type 2 diabetes mellitus with stage 3b chronic kidney disease, without long-term current use of insulin [E11.22, N18.32]  Yes    Coronary artery disease [I25.10]  Yes               Plan  Admit to Cardiology  Trend troponin  IV Lasix 60mg given in ED; give 40mg IV q12h x2 doses; closely monitor renal response  Fluid restriction  Strict I&O  Daily weights  Continue ASA/Brilinta/Coreg/Valsartan/atorvastatin/Isordil  Consult Cardiology; patient known to Dr. Vernon  Hold metformin/glipizide/Januvia  accuchecks with low dose SSI; hypoglycemia protocol        Diet: Cardiac/Diabetic    DVT Prophylaxis: Sub Q Lovenox for DVT prophylaxis. Encourage ambulation and OOB as tolerated.     Discharge Planning and  Disposition:  Patient will be discharged in 1-2 days  ________________________________________________________________________________    Face-to-Face encounter date: 06/08/2022  Encounter included review of the medical records, interviewing and examining the patient face-to-face, discussion with other health care providers including emergency medicine physician, admission orders, interpreting lab/test results and formulating a plan of care.   Medical Decision Making during this encounter was  [_] Low Complexity  [_] Moderate Complexity  [x] High Complexity  _________________________________________________________________________________    INPATIENT LIST OF MEDICATIONS     Current Facility-Administered Medications:     calcium chloride 1 g in dextrose 5 % 100 mL IVPB, 1 g, Intravenous, PRN, Elli Villa NP    calcium chloride 1 g in dextrose 5 % 100 mL IVPB, 1 g, Intravenous, PRN, Elli Villa NP    calcium chloride 1 g in dextrose 5 % 100 mL IVPB, 1 g, Intravenous, PRN, Elli Villa NP    dextrose 50% injection 12.5 g, 12.5 g, Intravenous, PRNElli NP    dextrose 50% injection 25 g, 25 g, Intravenous, PRN, Elli Villa NP    glucagon (human recombinant) injection 1 mg, 1 mg, Intramuscular, PRN, Elli Villa NP    glucose chewable tablet 16 g, 16 g, Oral, PRNElli NP    glucose chewable tablet 24 g, 24 g, Oral, PRN, Elli Villa NP    insulin aspart U-100 pen 0-5 Units, 0-5 Units, Subcutaneous, QID (AC + HS) PRN, Elli Villa NP    magnesium oxide tablet 800 mg, 800 mg, Oral, PRN, Elli Villa NP    magnesium sulfate 2g in water 50mL IVPB (premix), 2 g, Intravenous, PRN, Elli Villa NP    magnesium sulfate 2g in water 50mL IVPB (premix), 4 g, Intravenous, PRN, Elli Villa NP    magnesium sulfate 2g in water 50mL IVPB (premix), 2 g, Intravenous, PRN, Elli Villa NP    magnesium sulfate in dextrose IVPB  (premix) 1 g, 1 g, Intravenous, PRN, Elli Villa, NP    potassium chloride 10 mEq in 100 mL IVPB, 20 mEq, Intravenous, PRN, Elli Villa, NP    potassium chloride 10 mEq in 100 mL IVPB, 40 mEq, Intravenous, PRN, Elli Villa, NP    potassium chloride 10 mEq in 100 mL IVPB, 20 mEq, Intravenous, PRN, Elli Oriental orthodox, NP    potassium chloride 10 mEq in 100 mL IVPB, 40 mEq, Intravenous, PRN, Elli Villa, NP    potassium chloride SA CR tablet 20 mEq, 20 mEq, Oral, PRN, Elli Oriental orthodox, NP    potassium chloride SA CR tablet 20 mEq, 20 mEq, Oral, PRN, Elli Villa, NP    potassium chloride SA CR tablet 40 mEq, 40 mEq, Oral, PRN, Elli Villa, NP    potassium chloride SA CR tablet 40 mEq, 40 mEq, Oral, PRN, Elli Villa, NP    sodium phosphate 15 mmol in dextrose 5 % 250 mL IVPB, 15 mmol, Intravenous, PRN, Elli Villa, NP    sodium phosphate 15 mmol in dextrose 5 % 250 mL IVPB, 15 mmol, Intravenous, PRN, Elli Oriental orthodox, NP    sodium phosphate 20.01 mmol in dextrose 5 % 250 mL IVPB, 20.01 mmol, Intravenous, PRN, Elli Villa, NP    sodium phosphate 20.01 mmol in dextrose 5 % 250 mL IVPB, 20.01 mmol, Intravenous, PRN, Elli Villa, NP    sodium phosphate 30 mmol in dextrose 5 % 250 mL IVPB, 30 mmol, Intravenous, PRN, Elli Villa, NP    Current Outpatient Medications:     aspirin (ECOTRIN) 81 MG EC tablet, Take 1 tablet (81 mg total) by mouth once daily., Disp: 30 tablet, Rfl: 11    atorvastatin (LIPITOR) 80 MG tablet, Take 1 tablet (80 mg total) by mouth every evening., Disp: 90 tablet, Rfl: 3    carvediloL (COREG) 12.5 MG tablet, Take 1 tablet (12.5 mg total) by mouth 2 (two) times daily with meals., Disp: 180 tablet, Rfl: 3    glipiZIDE (GLUCOTROL) 10 MG tablet, Take 1 tablet (10 mg total) by mouth 2 (two) times daily with meals. (Patient taking differently: Take by mouth 2 (two) times daily with meals.), Disp: 180 tablet, Rfl: 1     hydrALAZINE (APRESOLINE) 25 MG tablet, Take 1 tablet (25 mg total) by mouth every 8 (eight) hours., Disp: 90 tablet, Rfl: 1    isosorbide dinitrate (ISORDIL) 20 MG tablet, Take 2 tablets (40 mg total) by mouth 3 (three) times daily., Disp: 180 tablet, Rfl: 1    metFORMIN (GLUCOPHAGE) 1000 MG tablet, Take 1 tablet (1,000 mg total) by mouth 2 (two) times daily with meals. Please hold medication for 2 more days. Can restart on 12/3/21 (Patient taking differently: Take 1,000 mg by mouth 2 (two) times daily with meals.), Disp: 180 tablet, Rfl: 1    MULTIVITAMIN W-MINERALS/LUTEIN (CENTRUM SILVER ORAL), Take 1 capsule by mouth once daily., Disp: , Rfl:     SITagliptin (JANUVIA) 100 MG Tab, Take 1 tablet (100 mg total) by mouth once daily., Disp: 30 tablet, Rfl: 11    ticagrelor (BRILINTA) 90 mg tablet, Take 1 tablet (90 mg total) by mouth 2 (two) times a day., Disp: 60 tablet, Rfl: 1    valsartan (DIOVAN) 80 MG tablet, Take 80 mg by mouth once daily., Disp: , Rfl:     alcohol swabs (BD ALCOHOL SWABS) PadM, Apply 1 each topically as needed., Disp: 100 each, Rfl: 6    blood sugar diagnostic (ACCU-CHEK OLGA PLUS TEST STRP) Strp, 1 each by Misc.(Non-Drug; Combo Route) route daily as needed., Disp: 100 each, Rfl: 6    blood sugar diagnostic Strp, To check BG 2 times daily, to use with insurance preferred meter, Disp: 200 strip, Rfl: 2    lancets Misc, To check BG 2 times daily, to use with insurance preferred meter, Disp: 100 each, Rfl: 0      Scheduled Meds:    Continuous Infusions:  PRN Meds:.      Elli Villa  [facility] Hospitalist  06/08/2022

## 2022-06-08 NOTE — ED PROVIDER NOTES
Encounter Date: 6/7/2022       History     Chief Complaint   Patient presents with    Chest Pain     Chest pain.  Given 3 ntg and 325 mg asa pta     Patient here with reported chest pain with associated shortness of breath call onset today patient has a history of coronary artery disease congestive heart failure patient received aspirin and 3 sublingual nitroglycerins EN route to the emergency department with some improvement patient reports he was hospitalized approximately a month ago seen by Dr. Vernon for the same they did make some adjustments in his medication he has been compliant with his medications he denies any fever        Review of patient's allergies indicates:   Allergen Reactions    Morphine Nausea And Vomiting    Vicodin [hydrocodone-acetaminophen] Nausea And Vomiting     Past Medical History:   Diagnosis Date    Coronary artery disease     Diabetes mellitus type II     Hypertension      Past Surgical History:   Procedure Laterality Date    ANGIOGRAM, CORONARY, WITH LEFT HEART CATHETERIZATION N/A 11/27/2021    Procedure: Angiogram, Coronary, with Left Heart Cath;  Surgeon: Dave Vernon MD;  Location: [facility] CATH/EP LAB;  Service: Cardiology;  Laterality: N/A;    ANGIOGRAM, CORONARY, WITH LEFT HEART CATHETERIZATION N/A 4/18/2022    Procedure: Angiogram, Coronary, with Left Heart Cath;  Surgeon: Dave Vernon MD;  Location: [facility] CATH/EP LAB;  Service: Cardiology;  Laterality: N/A;    APPENDECTOMY      CORONARY ANGIOGRAPHY Left 11/30/2021    Procedure: ANGIOGRAM, CORONARY ARTERY;  Surgeon: Gunnar Vernon MD;  Location: [facility] CATH LAB;  Service: Cardiology;  Laterality: Left;    HERNIA REPAIR      LEFT HEART CATHETERIZATION Left 4/20/2022    Procedure: Left heart cath;  Surgeon: Dave Vernon MD;  Location: [facility] CATH/EP LAB;  Service: Cardiology;  Laterality: Left;    PERCUTANEOUS TRANSLUMINAL ANGIOPLASTY N/A 4/20/2022    Procedure: PTA (ANGIOPLASTY, PERCUTANEOUS, TRANSLUMINAL);   Surgeon: Dave Vernon MD;  Location: [facility] CATH/EP LAB;  Service: Cardiology;  Laterality: N/A;    stents       Family History   Problem Relation Age of Onset    Diabetes Mother     Cancer Neg Hx     Macular degeneration Neg Hx     Retinal detachment Neg Hx     Glaucoma Neg Hx      Social History     Tobacco Use    Smoking status: Never Smoker    Smokeless tobacco: Never Used   Substance Use Topics    Alcohol use: Not Currently     Comment: 2 a year    Drug use: No     Review of Systems   Constitutional: Positive for diaphoresis and fatigue. Negative for chills and fever.   HENT: Negative for congestion.    Respiratory: Positive for cough and shortness of breath.    Cardiovascular: Positive for chest pain and leg swelling.   Gastrointestinal: Negative for abdominal pain, nausea and vomiting.   Genitourinary: Negative for dysuria.   Musculoskeletal: Negative for back pain.   Skin: Negative for rash.   All other systems reviewed and are negative.      Physical Exam     Initial Vitals   BP Pulse Resp Temp SpO2   06/07/22 2200 06/07/22 2200 06/07/22 2200 06/07/22 2247 06/07/22 2200   (!) 210/98 99 20 98.9 °F (37.2 °C) 97 %      MAP       --                Physical Exam    Constitutional: He appears well-developed and well-nourished. He is diaphoretic. He appears distressed.   HENT:   Head: Normocephalic and atraumatic.   Right Ear: External ear normal.   Left Ear: External ear normal.   Mouth/Throat: Oropharynx is clear and moist.   Eyes: EOM are normal. Pupils are equal, round, and reactive to light.   Neck: Neck supple.   Normal range of motion.  Cardiovascular: Normal rate, regular rhythm, S1 normal, S2 normal, normal heart sounds and intact distal pulses.   Pulmonary/Chest: He is in respiratory distress. He has rales.   Abdominal: Abdomen is soft. Bowel sounds are normal. There is no abdominal tenderness.   Musculoskeletal:         General: Edema present. Normal range of motion.      Cervical back: Normal  range of motion and neck supple.     Neurological: He is alert and oriented to person, place, and time. He has normal strength. GCS score is 15. GCS eye subscore is 4. GCS verbal subscore is 5. GCS motor subscore is 6.   Skin: Skin is warm. Capillary refill takes less than 2 seconds. No rash noted.   Psychiatric: He has a normal mood and affect. His behavior is normal.         ED Course   Procedures  Labs Reviewed   CBC W/ AUTO DIFFERENTIAL - Abnormal; Notable for the following components:       Result Value    RBC 3.83 (*)     Hemoglobin 11.5 (*)     Hematocrit 34.3 (*)     RDW 15.1 (*)     Lymph # 0.9 (*)     Gran % 77.1 (*)     Lymph % 10.8 (*)     All other components within normal limits   COMPREHENSIVE METABOLIC PANEL - Abnormal; Notable for the following components:    CO2 19 (*)     Glucose 200 (*)     BUN 29 (*)     Creatinine 1.7 (*)     eGFR if  45.2 (*)     eGFR if non  39.1 (*)     All other components within normal limits   TROPONIN I - Abnormal; Notable for the following components:    Troponin I 0.045 (*)     All other components within normal limits   B-TYPE NATRIURETIC PEPTIDE - Abnormal; Notable for the following components:     (*)     All other components within normal limits   MAGNESIUM   SARS-COV-2 RNA AMPLIFICATION, QUAL          Imaging Results          X-Ray Chest AP Portable (In process)                  Medications   morphine injection 2 mg (2 mg Intravenous Given 6/7/22 2218)   furosemide injection 60 mg (60 mg Intravenous Given 6/7/22 2219)   ondansetron injection 4 mg (4 mg Intravenous Given 6/7/22 2219)   nitroGLYCERIN 2% TD oint ointment 1 inch (1 inch Topical (Top) Given 6/7/22 2246)     Medical Decision Making:   ED Management:  Patient's laboratory evaluation reviewed placed on BiPAP given Lasix nitroglycerin took aspirin prior to arrival with the BiPAP patient's symptoms have improved of discussed his findings with the hospitalist who will  evaluate patient in the emergency department for admission                      Clinical Impression:   Final diagnoses:  [R07.9] Chest pain  [I50.9] Congestive heart failure, unspecified HF chronicity, unspecified heart failure type (Primary)          ED Disposition Condition    Admit               Leonides Smith MD  06/08/22 [id number]

## 2022-06-08 NOTE — CONSULTS
[facility]  Department of Cardiology  Consult Note      PATIENT NAME: Amadeo Levin  MRN: [id number]  TODAY'S DATE: 06/08/2022  ADMIT DATE: 6/7/2022                          CONSULT REQUESTED BY: Rudy Jaffe MD    SUBJECTIVE     PRINCIPAL PROBLEM: Acute on chronic systolic CHF (congestive heart failure)      REASON FOR CONSULT:  CHF  Elevated Troponin      HPI:        71-year-old male patient with a past medical history significant for coronary artery disease he had a non-STEMI in November 27, 2021 and was sent to Dr. Vernon he had a successful PCI to the PLB, distal RCA ostial PDA.  He had successful PCI of the distal RCA with a 3.0 x 26 mm YAMILA and mid RCA 4.0 x 38 YAMILA.  At that time he had a mid LAD lesion at 75% stenosis and at that time he was going to be a staged procedure for the LAD.  Came back April 18th with a non STEMI angiogram revealed right PDA lesion was 99% unchanged left coronary artery system with severe mid LAD stenosis which gives septal collaterals to the right PDA patent proximal and distal RCA with subtotal stenosis ostial right PDA in stent stenosis.  He had successful kissing balloon angioplasty of the right PDA and distal RCA with 2.5 semi compliant balloons.  He was then referred to Dr. Vernon for brachytherapy.  He is supposed to be seeing Dr. Vernon on the 25th to do this.  He presented to the ER yesterday with shortness of breath fluid overload and mild burning to his chest.  On admission  troponin has spiked to 1.479. Upon admission BP was very elevated. Up to 200 systolic.  His chronic kidney disease creatinine is 1.7.  Chest x-ray shows some pulmonary edema he feels better now that he is diuresed he tells me.  He is eager to go home.      Review of patient's allergies indicates:   Allergen Reactions    Morphine Nausea And Vomiting    Vicodin [hydrocodone-acetaminophen] Nausea And Vomiting       Past Medical History:   Diagnosis Date    Coronary artery disease      Diabetes mellitus type II     Hypertension      Past Surgical History:   Procedure Laterality Date    ANGIOGRAM, CORONARY, WITH LEFT HEART CATHETERIZATION N/A 11/27/2021    Procedure: Angiogram, Coronary, with Left Heart Cath;  Surgeon: Dave Vernon MD;  Location: [facility] CATH/EP LAB;  Service: Cardiology;  Laterality: N/A;    ANGIOGRAM, CORONARY, WITH LEFT HEART CATHETERIZATION N/A 4/18/2022    Procedure: Angiogram, Coronary, with Left Heart Cath;  Surgeon: Dave Vernon MD;  Location: [facility] CATH/EP LAB;  Service: Cardiology;  Laterality: N/A;    APPENDECTOMY      CORONARY ANGIOGRAPHY Left 11/30/2021    Procedure: ANGIOGRAM, CORONARY ARTERY;  Surgeon: Gunnar Vernon MD;  Location: [facility] CATH LAB;  Service: Cardiology;  Laterality: Left;    HERNIA REPAIR      LEFT HEART CATHETERIZATION Left 4/20/2022    Procedure: Left heart cath;  Surgeon: Dave Vernon MD;  Location: [facility] CATH/EP LAB;  Service: Cardiology;  Laterality: Left;    PERCUTANEOUS TRANSLUMINAL ANGIOPLASTY N/A 4/20/2022    Procedure: PTA (ANGIOPLASTY, PERCUTANEOUS, TRANSLUMINAL);  Surgeon: Dave Vernon MD;  Location: [facility] CATH/EP LAB;  Service: Cardiology;  Laterality: N/A;    stents       Social History     Tobacco Use    Smoking status: Never Smoker    Smokeless tobacco: Never Used   Substance Use Topics    Alcohol use: Not Currently     Comment: 2 a year    Drug use: No        REVIEW OF SYSTEMS  CONSTITUTIONAL: Negative for chills, fatigue and fever.   EYES: No double vision, No blurred vision  NEURO: No headaches, No dizziness  RESPIRATORY: +SOB  CARDIOVASCULAR: +CP  GI: Negative for abdominal pain, No melena, diarrhea, nausea and vomiting.   : Negative for dysuria and frequency, Negative for hematuria  SKIN: Negative for bruising, Negative for edema or discoloration noted.   ENDOCRINE: Negative for polyphagia, Negative for heat intolerance, Negative for cold intolerance  PSYCHIATRIC: Negative for depression, Negative for  anxiety, Negative for memory loss  MUSCULOSKELETAL: Negative for neck pain, Negative for muscle weakness, Negative for back pain     OBJECTIVE     VITAL SIGNS (Most Recent)  Temp: 98.6 °F (37 °C) (06/08/22 0900)  Pulse: 64 (06/08/22 0930)  Resp: 18 (06/08/22 0930)  BP: 134/61 (06/08/22 0930)  SpO2: 98 % (06/08/22 0930)    VENTILATION STATUS  Resp: 18 (06/08/22 0930)  SpO2: 98 % (06/08/22 0930)  Oxygen Concentration (%):  [30] 30  Resp Rate Total:  [15 br/min] 15 br/min    I & O (Last 24H):No intake or output data in the 24 hours ending 06/08/22 1010    WEIGHTS  Wt Readings from Last 3 Encounters:   06/08/22 0612 94 kg (207 lb 3.7 oz)   06/07/22 2200 90.7 kg (200 lb)   05/05/22 1348 93.9 kg (207 lb)   05/05/22 1104 94 kg (207 lb 3.7 oz)       PHYSICAL EXAM  GENERAL: well built, well nourished, well-developed in no apparent distress alert and oriented.   HEENT: Normocephalic. Pupils normal and conjunctivae normal.  Mucous membranes normal, no cyanosis or icterus, trachea central,no pallor or icterus is noted..   NECK: No JVD. No bruit..   THYROID: Thyroid not enlarged. No nodules present..   CARDIAC: Regular rate and rhythm. S1 is normal.S2 is normal.  CHEST ANATOMY: normal.   LUNGS: Diminished, faint crackles  ABDOMEN: Soft no masses or organomegaly.  No abdomen pulsations or bruits.  Normal bowel sounds. No pulsations and no masses felt, No guarding or rebound.   URINARY: No fraser catheter   EXTREMITIES: No cyanosis, clubbing or edema noted at this time., no calf tenderness bilaterally.   PERIPHERAL VASCULAR SYSTEM: Good palpable distal pulses.   CENTRAL NERVOUS SYSTEM: No focal motor or sensory deficits noted.   SKIN: Skin without lesions, moist, well perfused.   MUSCLE STRENGTH & TONE: No noteable weakness, atrophy or abnormal movement.     HOME MEDICATIONS:  No current facility-administered medications on file prior to encounter.     Current Outpatient Medications on File Prior to Encounter   Medication Sig  Dispense Refill    aspirin (ECOTRIN) 81 MG EC tablet Take 1 tablet (81 mg total) by mouth once daily. 30 tablet 11    atorvastatin (LIPITOR) 80 MG tablet Take 1 tablet (80 mg total) by mouth every evening. 90 tablet 3    carvediloL (COREG) 12.5 MG tablet Take 1 tablet (12.5 mg total) by mouth 2 (two) times daily with meals. 180 tablet 3    glipiZIDE (GLUCOTROL) 10 MG tablet Take 1 tablet (10 mg total) by mouth 2 (two) times daily with meals. (Patient taking differently: Take by mouth 2 (two) times daily with meals.) 180 tablet 1    hydrALAZINE (APRESOLINE) 25 MG tablet Take 1 tablet (25 mg total) by mouth every 8 (eight) hours. 90 tablet 1    isosorbide dinitrate (ISORDIL) 20 MG tablet Take 2 tablets (40 mg total) by mouth 3 (three) times daily. 180 tablet 1    metFORMIN (GLUCOPHAGE) 1000 MG tablet Take 1 tablet (1,000 mg total) by mouth 2 (two) times daily with meals. Please hold medication for 2 more days. Can restart on 12/3/21 (Patient taking differently: Take 1,000 mg by mouth 2 (two) times daily with meals.) 180 tablet 1    MULTIVITAMIN W-MINERALS/LUTEIN (CENTRUM SILVER ORAL) Take 1 capsule by mouth once daily.      SITagliptin (JANUVIA) 100 MG Tab Take 1 tablet (100 mg total) by mouth once daily. 30 tablet 11    ticagrelor (BRILINTA) 90 mg tablet Take 1 tablet (90 mg total) by mouth 2 (two) times a day. 60 tablet 1    valsartan (DIOVAN) 80 MG tablet Take 80 mg by mouth once daily.      alcohol swabs (BD ALCOHOL SWABS) PadM Apply 1 each topically as needed. 100 each 6    blood sugar diagnostic (ACCU-CHEK OLGA PLUS TEST STRP) Strp 1 each by Misc.(Non-Drug; Combo Route) route daily as needed. 100 each 6    blood sugar diagnostic Strp To check BG 2 times daily, to use with insurance preferred meter 200 strip 2    lancets Misc To check BG 2 times daily, to use with insurance preferred meter 100 each 0    [DISCONTINUED] clopidogreL (PLAVIX) 75 mg tablet Take 1 tablet (75 mg total) by mouth once  daily. 90 tablet 1       SCHEDULED MEDS:   aspirin  81 mg Oral Daily    atorvastatin  80 mg Oral QHS    carvediloL  12.5 mg Oral BID WM    chlorhexidine  15 mL Mouth/Throat BID    furosemide (LASIX) injection  40 mg Intravenous Q12H    hydrALAZINE  25 mg Oral Q8H    isosorbide dinitrate  40 mg Oral TID    multivitamin  1 tablet Oral Daily    mupirocin   Nasal BID    pantoprazole  40 mg Intravenous Daily    ticagrelor  90 mg Oral BID    valsartan  80 mg Oral Daily       CONTINUOUS INFUSIONS:    PRN MEDS:acetaminophen, calcium chloride IVPB, calcium chloride IVPB, calcium chloride IVPB, dextrose 50%, dextrose 50%, glucagon (human recombinant), glucose, glucose, insulin aspart U-100, loperamide, magnesium oxide, magnesium sulfate IVPB, magnesium sulfate IVPB, magnesium sulfate IVPB, magnesium sulfate IVPB, melatonin, ondansetron, potassium chloride in water, potassium chloride in water, potassium chloride in water, potassium chloride in water, potassium chloride, potassium chloride, potassium chloride, potassium chloride, senna-docusate 8.6-50 mg, sodium chloride 0.9%, sodium phosphate IVPB, sodium phosphate IVPB, sodium phosphate IVPB, sodium phosphate IVPB, sodium phosphate IVPB    LABS AND DIAGNOSTICS     CBC LAST 3 DAYS  Recent Labs   Lab 06/07/22 2207 06/08/22  0537   WBC 8.71 7.61   RBC 3.83* 3.67*   HGB 11.5* 11.1*   HCT 34.3* 33.1*   MCV 90 90   MCH 30.0 30.2   MCHC 33.5 33.5   RDW 15.1* 15.0*    155   MPV 10.3 9.8   GRAN 77.1*  6.7 75.0*  5.7   LYMPH 10.8*  0.9* 11.8*  0.9*   MONO 6.0  0.5 9.7  0.7   BASO 0.09 0.07   NRBC 0 0       COAGULATION LAST 3 DAYS  Recent Labs   Lab 06/08/22  0537   LABPT 15.0*   INR 1.3   APTT 26.8       CHEMISTRY LAST 3 DAYS  Recent Labs   Lab 06/07/22 2207      K 4.6      CO2 19*   ANIONGAP 10   BUN 29*   CREATININE 1.7*   *   CALCIUM 9.0   MG 1.8   ALBUMIN 4.3   PROT 7.2   ALKPHOS 86   ALT 22   AST 22   BILITOT 0.8       CARDIAC PROFILE  LAST 3 DAYS  Recent Labs   Lab 06/07/22 2207 06/08/22  0354   *  --    TROPONINI 0.045* 1.479*       ENDOCRINE LAST 3 DAYS  No results for input(s): TSH, PROCAL in the last 168 hours.    LAST ARTERIAL BLOOD GAS  ABG  No results for input(s): PH, PO2, PCO2, HCO3, BE in the last 168 hours.    LAST 7 DAYS MICROBIOLOGY   Microbiology Results (last 7 days)       ** No results found for the last 168 hours. **            MOST RECENT IMAGING  X-Ray Chest AP Portable  CLINICAL HISTORY:  71 years (1950) Male Chest Pain Chest Pain    TECHNIQUE:  Portable AP radiograph the chest.    COMPARISON:  Radiograph from April 19, 2022    FINDINGS:  Very mildly increased central hilar interstitial opacities are seen bilaterally suggestive of either trace edema, atypical infection/pneumonia or bronchitis in the appropriate clinical setting. No consolidative pneumonia is seen. Costophrenic angles are seen without effusion. No pneumothorax is identified. The heart is top normal in size. Atheromatous calcifications are seen at the aortic arch. Osseous structures show degenerative changes in the spine. The visualized upper abdomen is unremarkable.    IMPRESSION:  Findings suggestive of very mild pulmonary vascular congestion/trace interstitial edema.    .    Electronically signed by:  Wally Loving MD  6/8/2022 8:17 AM CDT Workstation: [id number]      ECHOCARDIOGRAM RESULTS (last 5)  Results for orders placed during the hospital encounter of 04/18/22    Echo    Interpretation Summary  · The left ventricle is normal in size with low normal systolic function.  · The estimated ejection fraction is 50%.  · Normal left ventricular diastolic function.  · Normal right ventricular size with normal right ventricular systolic function.      Results for orders placed during the hospital encounter of 11/26/21    Echo    Interpretation Summary  · The left ventricle is normal in size with severe concentric hypertrophy and mildly decreased  systolic function.  · The estimated ejection fraction is 45%. There appears to be inferolateral hypokinesis.  · Indeterminate left ventricular diastolic function.  · Normal right ventricular size with normal right ventricular systolic function.  · Normal central venous pressure (3 mmHg).      CURRENT/PREVIOUS VISIT EKG  Results for orders placed or performed during the hospital encounter of 06/07/22   EKG 12-lead    Collection Time: 06/07/22 10:04 PM    Narrative    Test Reason : R07.9,    Vent. Rate : 096 BPM     Atrial Rate : 097 BPM     P-R Int : 164 ms          QRS Dur : 148 ms      QT Int : 392 ms       P-R-T Axes : 067 003 162 degrees     QTc Int : 495 ms    Normal sinus rhythm  Left bundle branch block  Abnormal ECG  When compared with ECG of 18-APR-2022 01:38,  T wave inversion less evident in Inferior leads    Referred By: [email or web address]   SELF           Confirmed By:            ASSESSMENT/PLAN:     Active Hospital Problems    Diagnosis    *Acute on chronic systolic CHF (congestive heart failure)    Hypertensive emergency    Type 2 diabetes mellitus with stage 3b chronic kidney disease, without long-term current use of insulin    Coronary artery disease       ASSESSMENT & PLAN:     1. Acute CHF exacerbation  2.  NSTEMI  3. LVEF 50%  4. H/O CAD with PCI RCA and PDA- Plan for brachytherapy  5. History of lesion to MID LAD 75%  6. HTN Emergency on admit  7. DM    RECOMMENDATIONS:      BP has improved.  Symptoms of SOB have improved with diuresis per patient however I and O is not documented.  Limited ECHO to see LVEF and Wall motion as he has a history of 75% lesion to LAD  Continue Brilinta and ASA  Continue Statin therapy  Continue Hydralazine, Valsartan and Isordil at present dosing  Continue Coreg at present dosing  Trend Troponin  I will have Dr. ADORE Sofia review the case as well and make appropriate recommendations.         Geovanna Nunez NP  [facility]  Department of Cardiology  Date of  Service: 06/08/2022      71-year-old gentleman with history of significant coronary artery disease status post revascularization procedures right coronary artery and right PDA was readmitted with symptoms of acute decompensation of congestive heart failure.  I have reviewed all his angiographic evaluations and results of last intervention appear to be good.  Lad appear to be diffusely disease in the mid and distal segment with very small caliber vessel.  His echocardiogram shows decrease in LV function.  Probable cause for his symptoms  Hypertensive emergency on admission probably precipitated some of his acute symptoms.  His  · Echocardiogram at present showsThe left ventricle is normal in size with concentric remodeling and mildly decreased systolic function.  · The estimated ejection fraction is 40%.  · Left ventricular diastolic dysfunction.  · There is abnormal septal wall motion consistent with left bundle branch block.  · Normal right ventricular size with normal right ventricular systolic function.  · Mild left atrial enlargement.  · Mild tricuspid regurgitation.  · Normal central venous pressure (3 mmHg).     Recommend to discontinue on Diovan  Start on Entresto 24/26 mg p.o. b.i.d..  Continue on his present medications to include hydralazine 25 mg q.8 hours isosorbide dinitrate 40 mg 3 times a day  I have personally interviewed and examined the patient, I have reviewed the Nurse Practitioner's history and physical, assessment, and plan. I agree with the findings and plan with above changes  .      Yousif Sofia M.D.  [facility]  Department of Cardiology  Date of Service: 06/08/2022  10:10 AM

## 2022-06-08 NOTE — PROGRESS NOTES
Patient is noted to have some elevation of serial troponins.  Any off significant coronary artery disease and comorbid conditions risk factor  Recommend  1. Start him on heparin drip per protocol  2. Keep him NPO post midnight  3. May consider cardiac catheterization more definite diagnosis tomorrow  Patient denies having chest discomfort at this time

## 2022-06-08 NOTE — PLAN OF CARE
"[facility]  Initial Discharge Assessment       Primary Care Provider: Angelito Adams MD    Admission Diagnosis: Congestive heart failure, unspecified HF chronicity, unspecified heart failure type [I50.9]    Admission Date: 6/7/2022  Expected Discharge Date:     Discharge Barriers Identified: (P) None    Discharge assessment completed with pt at bedside. Advanced Directives not discussed with pt at this time. Pt plans to return home on discharge with wife, Sima Levin ([phone]). No needs at this time.     Payor: [employer] MANAGED MEDICARE / Plan: [employer] MEDICARE HMO / Product Type: Capitation /     Extended Emergency Contact Information  Primary Emergency Contact: Sima Levin  Address: [address] [place] [address] [facility]  Home Phone: [phone]  Mobile Phone: [phone]  Relation: Spouse  Preferred language: English   needed? No    Discharge Plan A: (P) Home  Discharge Plan B: (P) Home      [facility] [address]  [address] [facility] [address]  Phone: [phone] Fax: [phone]    [id number] [place] [address] [facility] [address]  [address]  [facility] [address]  Phone: [phone] Fax: [phone]      Initial Assessment (most recent)       Adult Discharge Assessment - 06/08/22 1423          Discharge Assessment    Assessment Type Discharge Planning Assessment (P)      Confirmed/corrected address, phone number and insurance Yes (P)      Confirmed Demographics Correct on Facesheet (P)      Source of Information patient (P)      When was your last doctors appointment? -- (P)    April 2022    Communicated DAREN with patient/caregiver No (P)      Reason For Admission "I was fluid overloaded" (P)      Lives With spouse (P)      Facility Arrived From: home (P)      Do you expect to return to your current living situation? Yes (P)      Do you have " help at home or someone to help you manage your care at home? Yes (P)      Who are your caregiver(s) and their phone number(s)? Sima Levin ([phone]) (P)      Prior to hospitilization cognitive status: Alert/Oriented (P)      Current cognitive status: Alert/Oriented (P)      Walking or Climbing Stairs Difficulty none (P)      Dressing/Bathing Difficulty none (P)      Home Layout Able to live on 1st floor (P)      Equipment Currently Used at Home glucometer (P)      Readmission within 30 days? No (P)      Patient currently being followed by outpatient case management? No (P)      Do you currently have service(s) that help you manage your care at home? No (P)      Do you take prescription medications? Yes (P)      Do you have prescription coverage? Yes (P)      Coverage [employer] (P)      Do you have any problems affording any of your prescribed medications? No (P)      Is the patient taking medications as prescribed? yes (P)      Who is going to help you get home at discharge? Sima Levin ([phone]) (P)      How do you get to doctors appointments? car, drives self (P)      Are you on dialysis? No (P)      Do you take coumadin? No (P)      Discharge Plan A Home (P)      Discharge Plan B Home (P)      DME Needed Upon Discharge  none (P)      Discharge Barriers Identified None (P)         Relationship/Environment    Name(s) of Who Lives With Patient Sima Levin ([phone]) (P)

## 2022-06-08 NOTE — NURSING
1724- Critical troponin of 4.205 called from lab  Dr KIRSTEN sofia notified, orders for heparin gtt given, see MAR.   Dr Sofia also ordered NPO at midnight for possible angiogram tomorrow morning

## 2022-06-09 ENCOUNTER — TELEPHONE (OUTPATIENT)
Dept: FAMILY MEDICINE | Facility: CLINIC | Age: 72
End: 2022-06-09
Payer: MEDICARE

## 2022-06-09 VITALS
TEMPERATURE: 98 F | BODY MASS INDEX: 30.7 KG/M2 | WEIGHT: 207.25 LBS | DIASTOLIC BLOOD PRESSURE: 76 MMHG | RESPIRATION RATE: 15 BRPM | OXYGEN SATURATION: 99 % | HEIGHT: 69 IN | SYSTOLIC BLOOD PRESSURE: 181 MMHG | HEART RATE: 69 BPM

## 2022-06-09 PROBLEM — N18.31 ACUTE RENAL FAILURE SUPERIMPOSED ON STAGE 3A CHRONIC KIDNEY DISEASE: Status: ACTIVE | Noted: 2022-04-18

## 2022-06-09 PROBLEM — I50.23 ACUTE ON CHRONIC SYSTOLIC CHF (CONGESTIVE HEART FAILURE): Status: RESOLVED | Noted: 2021-11-27 | Resolved: 2022-06-09

## 2022-06-09 PROBLEM — I16.1 HYPERTENSIVE EMERGENCY: Status: RESOLVED | Noted: 2021-11-27 | Resolved: 2022-06-09

## 2022-06-09 LAB
ANION GAP SERPL CALC-SCNC: 10 MMOL/L (ref 8–16)
APTT PPP: 100.4 SEC (ref 23.3–35.1)
APTT PPP: 41.3 SEC (ref 23.3–35.1)
APTT PPP: 53.1 SEC (ref 23.3–35.1)
BASOPHILS # BLD AUTO: 0.07 K/UL (ref 0–0.2)
BASOPHILS # BLD AUTO: 0.07 K/UL (ref 0–0.2)
BASOPHILS NFR BLD: 0.9 % (ref 0–1.9)
BASOPHILS NFR BLD: 0.9 % (ref 0–1.9)
BUN SERPL-MCNC: 39 MG/DL (ref 8–23)
CALCIUM SERPL-MCNC: 9.1 MG/DL (ref 8.7–10.5)
CHLORIDE SERPL-SCNC: 104 MMOL/L (ref 95–110)
CO2 SERPL-SCNC: 22 MMOL/L (ref 23–29)
CREAT SERPL-MCNC: 2.1 MG/DL (ref 0.5–1.4)
DIFFERENTIAL METHOD: ABNORMAL
DIFFERENTIAL METHOD: ABNORMAL
EOSINOPHIL # BLD AUTO: 0.3 K/UL (ref 0–0.5)
EOSINOPHIL # BLD AUTO: 0.3 K/UL (ref 0–0.5)
EOSINOPHIL NFR BLD: 3.4 % (ref 0–8)
EOSINOPHIL NFR BLD: 3.4 % (ref 0–8)
ERYTHROCYTE [DISTWIDTH] IN BLOOD BY AUTOMATED COUNT: 15 % (ref 11.5–14.5)
ERYTHROCYTE [DISTWIDTH] IN BLOOD BY AUTOMATED COUNT: 15 % (ref 11.5–14.5)
EST. GFR  (AFRICAN AMERICAN): 35 ML/MIN/1.73 M^2
EST. GFR  (NON AFRICAN AMERICAN): 30.3 ML/MIN/1.73 M^2
GLUCOSE SERPL-MCNC: 178 MG/DL (ref 70–110)
GLUCOSE SERPL-MCNC: 190 MG/DL (ref 70–110)
GLUCOSE SERPL-MCNC: 192 MG/DL (ref 70–110)
HCT VFR BLD AUTO: 35.6 % (ref 40–54)
HCT VFR BLD AUTO: 35.6 % (ref 40–54)
HGB BLD-MCNC: 11.8 G/DL (ref 14–18)
HGB BLD-MCNC: 11.8 G/DL (ref 14–18)
IMM GRANULOCYTES # BLD AUTO: 0.02 K/UL (ref 0–0.04)
IMM GRANULOCYTES # BLD AUTO: 0.02 K/UL (ref 0–0.04)
IMM GRANULOCYTES NFR BLD AUTO: 0.3 % (ref 0–0.5)
IMM GRANULOCYTES NFR BLD AUTO: 0.3 % (ref 0–0.5)
LYMPHOCYTES # BLD AUTO: 1.2 K/UL (ref 1–4.8)
LYMPHOCYTES # BLD AUTO: 1.2 K/UL (ref 1–4.8)
LYMPHOCYTES NFR BLD: 15.9 % (ref 18–48)
LYMPHOCYTES NFR BLD: 15.9 % (ref 18–48)
MAGNESIUM SERPL-MCNC: 1.7 MG/DL (ref 1.6–2.6)
MCH RBC QN AUTO: 29.3 PG (ref 27–31)
MCH RBC QN AUTO: 29.3 PG (ref 27–31)
MCHC RBC AUTO-ENTMCNC: 33.1 G/DL (ref 32–36)
MCHC RBC AUTO-ENTMCNC: 33.1 G/DL (ref 32–36)
MCV RBC AUTO: 88 FL (ref 82–98)
MCV RBC AUTO: 88 FL (ref 82–98)
MONOCYTES # BLD AUTO: 0.6 K/UL (ref 0.3–1)
MONOCYTES # BLD AUTO: 0.6 K/UL (ref 0.3–1)
MONOCYTES NFR BLD: 8.2 % (ref 4–15)
MONOCYTES NFR BLD: 8.2 % (ref 4–15)
NEUTROPHILS # BLD AUTO: 5.3 K/UL (ref 1.8–7.7)
NEUTROPHILS # BLD AUTO: 5.3 K/UL (ref 1.8–7.7)
NEUTROPHILS NFR BLD: 71.3 % (ref 38–73)
NEUTROPHILS NFR BLD: 71.3 % (ref 38–73)
NRBC BLD-RTO: 0 /100 WBC
NRBC BLD-RTO: 0 /100 WBC
PLATELET # BLD AUTO: 172 K/UL (ref 150–450)
PLATELET # BLD AUTO: 172 K/UL (ref 150–450)
PMV BLD AUTO: 10.5 FL (ref 9.2–12.9)
PMV BLD AUTO: 10.5 FL (ref 9.2–12.9)
POTASSIUM SERPL-SCNC: 4.7 MMOL/L (ref 3.5–5.1)
RBC # BLD AUTO: 4.03 M/UL (ref 4.6–6.2)
RBC # BLD AUTO: 4.03 M/UL (ref 4.6–6.2)
SODIUM SERPL-SCNC: 136 MMOL/L (ref 136–145)
TROPONIN I SERPL DL<=0.01 NG/ML-MCNC: 2.83 NG/ML
WBC # BLD AUTO: 7.44 K/UL (ref 3.9–12.7)
WBC # BLD AUTO: 7.44 K/UL (ref 3.9–12.7)

## 2022-06-09 PROCEDURE — 80048 BASIC METABOLIC PNL TOTAL CA: CPT | Performed by: NURSE PRACTITIONER

## 2022-06-09 PROCEDURE — 36415 COLL VENOUS BLD VENIPUNCTURE: CPT | Performed by: INTERNAL MEDICINE

## 2022-06-09 PROCEDURE — 25000003 PHARM REV CODE 250: Performed by: INTERNAL MEDICINE

## 2022-06-09 PROCEDURE — 99900035 HC TECH TIME PER 15 MIN (STAT)

## 2022-06-09 PROCEDURE — 94761 N-INVAS EAR/PLS OXIMETRY MLT: CPT

## 2022-06-09 PROCEDURE — C9113 INJ PANTOPRAZOLE SODIUM, VIA: HCPCS | Performed by: INTERNAL MEDICINE

## 2022-06-09 PROCEDURE — 99233 SBSQ HOSP IP/OBS HIGH 50: CPT | Mod: ,,, | Performed by: INTERNAL MEDICINE

## 2022-06-09 PROCEDURE — 85730 THROMBOPLASTIN TIME PARTIAL: CPT | Performed by: INTERNAL MEDICINE

## 2022-06-09 PROCEDURE — 63600175 PHARM REV CODE 636 W HCPCS: Performed by: INTERNAL MEDICINE

## 2022-06-09 PROCEDURE — 25000003 PHARM REV CODE 250: Performed by: NURSE PRACTITIONER

## 2022-06-09 PROCEDURE — 85730 THROMBOPLASTIN TIME PARTIAL: CPT | Mod: 91 | Performed by: INTERNAL MEDICINE

## 2022-06-09 PROCEDURE — 85025 COMPLETE CBC W/AUTO DIFF WBC: CPT | Performed by: NURSE PRACTITIONER

## 2022-06-09 PROCEDURE — 84484 ASSAY OF TROPONIN QUANT: CPT | Performed by: INTERNAL MEDICINE

## 2022-06-09 PROCEDURE — 83735 ASSAY OF MAGNESIUM: CPT | Performed by: NURSE PRACTITIONER

## 2022-06-09 PROCEDURE — 99233 PR SUBSEQUENT HOSPITAL CARE,LEVL III: ICD-10-PCS | Mod: ,,, | Performed by: INTERNAL MEDICINE

## 2022-06-09 RX ORDER — SODIUM CHLORIDE 9 MG/ML
INJECTION, SOLUTION INTRAVENOUS ONCE
Status: COMPLETED | OUTPATIENT
Start: 2022-06-09 | End: 2022-06-09

## 2022-06-09 RX ORDER — DIPHENHYDRAMINE HCL 25 MG
50 CAPSULE ORAL
Status: CANCELLED | OUTPATIENT
Start: 2022-06-09

## 2022-06-09 RX ORDER — SODIUM CHLORIDE 9 MG/ML
INJECTION, SOLUTION INTRAVENOUS ONCE
Status: CANCELLED | OUTPATIENT
Start: 2022-06-09 | End: 2022-06-09

## 2022-06-09 RX ORDER — FUROSEMIDE 20 MG/1
20 TABLET ORAL DAILY PRN
Qty: 90 TABLET | Refills: 0 | Status: ON HOLD | OUTPATIENT
Start: 2022-06-09 | End: 2022-06-17 | Stop reason: HOSPADM

## 2022-06-09 RX ORDER — DIPHENHYDRAMINE HCL 25 MG
50 CAPSULE ORAL
Status: DISCONTINUED | OUTPATIENT
Start: 2022-06-09 | End: 2022-06-09 | Stop reason: HOSPADM

## 2022-06-09 RX ADMIN — TICAGRELOR 90 MG: 90 TABLET ORAL at 08:06

## 2022-06-09 RX ADMIN — HEPARIN SODIUM 15 UNITS/KG/HR: 10000 INJECTION, SOLUTION INTRAVENOUS at 08:06

## 2022-06-09 RX ADMIN — ISOSORBIDE DINITRATE 40 MG: 10 TABLET ORAL at 11:06

## 2022-06-09 RX ADMIN — SODIUM CHLORIDE: 0.9 INJECTION, SOLUTION INTRAVENOUS at 07:06

## 2022-06-09 RX ADMIN — PANTOPRAZOLE SODIUM 40 MG: 40 INJECTION, POWDER, FOR SOLUTION INTRAVENOUS at 05:06

## 2022-06-09 RX ADMIN — CARVEDILOL 12.5 MG: 12.5 TABLET, FILM COATED ORAL at 11:06

## 2022-06-09 RX ADMIN — ASPIRIN 81 MG: 81 TABLET, COATED ORAL at 08:06

## 2022-06-09 RX ADMIN — SACUBITRIL AND VALSARTAN 1 TABLET: 24; 26 TABLET, FILM COATED ORAL at 08:06

## 2022-06-09 RX ADMIN — HYDRALAZINE HYDROCHLORIDE 25 MG: 25 TABLET ORAL at 05:06

## 2022-06-09 RX ADMIN — THERA TABS 1 TABLET: TAB at 08:06

## 2022-06-09 NOTE — PROGRESS NOTES
[facility]  Department of Cardiology  Consult Note      PATIENT NAME: Amadeo Levin  MRN: [id number]  TODAY'S DATE: 06/09/2022  ADMIT DATE: 6/7/2022                          CONSULT REQUESTED BY: Rudy Jaffe MD    SUBJECTIVE     PRINCIPAL PROBLEM: Acute on chronic systolic CHF (congestive heart failure)      REASON FOR CONSULT:  CHF  Elevated Troponin          INTERVAL HISTORY:    Patient is feeling well. Denies CP or SOB.   Creatinine slightly elevated today. Troponin this am trended down. He has already spoke to Dr. Vernon's office this morning.     HPI:        71-year-old male patient with a past medical history significant for coronary artery disease he had a non-STEMI in November 27, 2021 and was sent to Dr. Vernon he had a successful PCI to the PLB, distal RCA ostial PDA.  He had successful PCI of the distal RCA with a 3.0 x 26 mm YAMILA and mid RCA 4.0 x 38 YAMILA.  At that time he had a mid LAD lesion at 75% stenosis and at that time he was going to be a staged procedure for the LAD.  Came back April 18th with a non STEMI angiogram revealed right PDA lesion was 99% unchanged left coronary artery system with severe mid LAD stenosis which gives septal collaterals to the right PDA patent proximal and distal RCA with subtotal stenosis ostial right PDA in stent stenosis.  He had successful kissing balloon angioplasty of the right PDA and distal RCA with 2.5 semi compliant balloons.  He was then referred to Dr. Vernon for brachytherapy.  He is supposed to be seeing Dr. Vernon on the 25th to do this.  He presented to the ER yesterday with shortness of breath fluid overload and mild burning to his chest.  On admission  troponin has spiked to 1.479. Upon admission BP was very elevated. Up to 200 systolic.  His chronic kidney disease creatinine is 1.7.  Chest x-ray shows some pulmonary edema he feels better now that he is diuresed he tells me.  He is eager to go home.      Review of patient's allergies  indicates:   Allergen Reactions    Morphine Nausea And Vomiting    Vicodin [hydrocodone-acetaminophen] Nausea And Vomiting       Past Medical History:   Diagnosis Date    Coronary artery disease     Diabetes mellitus type II     Hypertension      Past Surgical History:   Procedure Laterality Date    ANGIOGRAM, CORONARY, WITH LEFT HEART CATHETERIZATION N/A 11/27/2021    Procedure: Angiogram, Coronary, with Left Heart Cath;  Surgeon: Dave Vernon MD;  Location: [facility] CATH/EP LAB;  Service: Cardiology;  Laterality: N/A;    ANGIOGRAM, CORONARY, WITH LEFT HEART CATHETERIZATION N/A 4/18/2022    Procedure: Angiogram, Coronary, with Left Heart Cath;  Surgeon: Dave Vernon MD;  Location: [facility] CATH/EP LAB;  Service: Cardiology;  Laterality: N/A;    APPENDECTOMY      CORONARY ANGIOGRAPHY Left 11/30/2021    Procedure: ANGIOGRAM, CORONARY ARTERY;  Surgeon: Gunnar Vernon MD;  Location: [facility] CATH LAB;  Service: Cardiology;  Laterality: Left;    HERNIA REPAIR      LEFT HEART CATHETERIZATION Left 4/20/2022    Procedure: Left heart cath;  Surgeon: Dave Vernon MD;  Location: [facility] CATH/EP LAB;  Service: Cardiology;  Laterality: Left;    PERCUTANEOUS TRANSLUMINAL ANGIOPLASTY N/A 4/20/2022    Procedure: PTA (ANGIOPLASTY, PERCUTANEOUS, TRANSLUMINAL);  Surgeon: Dave Vernon MD;  Location: [facility] CATH/EP LAB;  Service: Cardiology;  Laterality: N/A;    stents       Social History     Tobacco Use    Smoking status: Never Smoker    Smokeless tobacco: Never Used   Substance Use Topics    Alcohol use: Not Currently     Comment: 2 a year    Drug use: No        REVIEW OF SYSTEMS  CONSTITUTIONAL: Negative for chills, fatigue and fever.   EYES: No double vision, No blurred vision  NEURO: No headaches, No dizziness  RESPIRATORY: NO SOB  CARDIOVASCULAR: NO CP  GI: Negative for abdominal pain, No melena, diarrhea, nausea and vomiting.   : Negative for dysuria and frequency, Negative for hematuria  SKIN: Negative for bruising, Negative  for edema or discoloration noted.   ENDOCRINE: Negative for polyphagia, Negative for heat intolerance, Negative for cold intolerance  PSYCHIATRIC: Negative for depression, Negative for anxiety, Negative for memory loss  MUSCULOSKELETAL: Negative for neck pain, Negative for muscle weakness, Negative for back pain     OBJECTIVE     VITAL SIGNS (Most Recent)  Temp: 98.4 °F (36.9 °C) (06/09/22 0725)  Pulse: 69 (06/09/22 0725)  Resp: 15 (06/09/22 0922)  BP: (!) 181/76 (06/09/22 1144)  SpO2: 99 % (06/09/22 0725)    VENTILATION STATUS  Resp: 15 (06/09/22 0922)  SpO2: 99 % (06/09/22 0725)       I & O (Last 24H):    Intake/Output Summary (Last 24 hours) at 6/9/2022 1242  Last data filed at 6/9/2022 0727  Gross per 24 hour   Intake 240 ml   Output 2300 ml   Net -2060 ml       WEIGHTS  Wt Readings from Last 3 Encounters:   06/08/22 0612 94 kg (207 lb 3.7 oz)   06/07/22 2200 90.7 kg (200 lb)   06/08/22 1050 94 kg (207 lb 3.7 oz)   05/05/22 1348 93.9 kg (207 lb)       PHYSICAL EXAM  GENERAL: well built, well nourished, well-developed in no apparent distress alert and oriented.   HEENT: Normocephalic. Pupils normal and conjunctivae normal.  Mucous membranes normal, no cyanosis or icterus, trachea central,no pallor or icterus is noted..   NECK: No JVD. No bruit..   THYROID: Thyroid not enlarged. No nodules present..   CARDIAC: Regular rate and rhythm. S1 is normal.S2 is normal.  CHEST ANATOMY: normal.   LUNGS: CTA  ABDOMEN: Soft no masses or organomegaly.  No abdomen pulsations or bruits.  Normal bowel sounds. No pulsations and no masses felt, No guarding or rebound.   URINARY: No fraser catheter   EXTREMITIES: No cyanosis, clubbing or edema noted at this time., no calf tenderness bilaterally.   PERIPHERAL VASCULAR SYSTEM: Good palpable distal pulses.   CENTRAL NERVOUS SYSTEM: No focal motor or sensory deficits noted.   SKIN: Skin without lesions, moist, well perfused.   MUSCLE STRENGTH & TONE: No noteable weakness, atrophy or  abnormal movement.     HOME MEDICATIONS:  No current facility-administered medications on file prior to encounter.     Current Outpatient Medications on File Prior to Encounter   Medication Sig Dispense Refill    aspirin (ECOTRIN) 81 MG EC tablet Take 1 tablet (81 mg total) by mouth once daily. 30 tablet 11    atorvastatin (LIPITOR) 80 MG tablet Take 1 tablet (80 mg total) by mouth every evening. 90 tablet 3    carvediloL (COREG) 12.5 MG tablet Take 1 tablet (12.5 mg total) by mouth 2 (two) times daily with meals. 180 tablet 3    glipiZIDE (GLUCOTROL) 10 MG tablet Take 1 tablet (10 mg total) by mouth 2 (two) times daily with meals. (Patient taking differently: Take by mouth 2 (two) times daily with meals.) 180 tablet 1    hydrALAZINE (APRESOLINE) 25 MG tablet Take 1 tablet (25 mg total) by mouth every 8 (eight) hours. 90 tablet 1    isosorbide dinitrate (ISORDIL) 20 MG tablet Take 2 tablets (40 mg total) by mouth 3 (three) times daily. 180 tablet 1    metFORMIN (GLUCOPHAGE) 1000 MG tablet Take 1 tablet (1,000 mg total) by mouth 2 (two) times daily with meals. Please hold medication for 2 more days. Can restart on 12/3/21 (Patient taking differently: Take 1,000 mg by mouth 2 (two) times daily with meals.) 180 tablet 1    MULTIVITAMIN W-MINERALS/LUTEIN (CENTRUM SILVER ORAL) Take 1 capsule by mouth once daily.      SITagliptin (JANUVIA) 100 MG Tab Take 1 tablet (100 mg total) by mouth once daily. 30 tablet 11    ticagrelor (BRILINTA) 90 mg tablet Take 1 tablet (90 mg total) by mouth 2 (two) times a day. 60 tablet 1    valsartan (DIOVAN) 80 MG tablet Take 80 mg by mouth once daily.      alcohol swabs (BD ALCOHOL SWABS) PadM Apply 1 each topically as needed. 100 each 6    blood sugar diagnostic (ACCU-CHEK OLGA PLUS TEST STRP) Strp 1 each by Misc.(Non-Drug; Combo Route) route daily as needed. 100 each 6    blood sugar diagnostic Strp To check BG 2 times daily, to use with insurance preferred meter 200 strip 2    lancets  Misc To check BG 2 times daily, to use with insurance preferred meter 100 each 0    [DISCONTINUED] clopidogreL (PLAVIX) 75 mg tablet Take 1 tablet (75 mg total) by mouth once daily. 90 tablet 1       SCHEDULED MEDS:   aspirin  81 mg Oral Daily    atorvastatin  80 mg Oral QHS    carvediloL  12.5 mg Oral BID WM    chlorhexidine  15 mL Mouth/Throat BID    hydrALAZINE  25 mg Oral Q8H    isosorbide dinitrate  40 mg Oral TID    multivitamin  1 tablet Oral Daily    mupirocin   Nasal BID    pantoprazole  40 mg Intravenous Daily    sacubitriL-valsartan  1 tablet Oral BID    ticagrelor  90 mg Oral BID       CONTINUOUS INFUSIONS:    PRN MEDS:acetaminophen, calcium chloride IVPB, calcium chloride IVPB, calcium chloride IVPB, dextrose 50%, dextrose 50%, diphenhydrAMINE, glucagon (human recombinant), glucose, glucose, insulin aspart U-100, loperamide, magnesium oxide, magnesium sulfate IVPB, magnesium sulfate IVPB, magnesium sulfate IVPB, magnesium sulfate IVPB, melatonin, ondansetron, potassium chloride in water, potassium chloride in water, potassium chloride in water, potassium chloride in water, potassium chloride, potassium chloride, potassium chloride, potassium chloride, senna-docusate 8.6-50 mg, sodium chloride 0.9%, sodium phosphate IVPB, sodium phosphate IVPB, sodium phosphate IVPB, sodium phosphate IVPB, sodium phosphate IVPB    LABS AND DIAGNOSTICS     CBC LAST 3 DAYS  Recent Labs   Lab 06/08/22  0537 06/08/22  1746 06/09/22  0814   WBC 7.61 8.63 7.44  7.44   RBC 3.67* 3.91* 4.03*  4.03*   HGB 11.1* 11.5* 11.8*  11.8*   HCT 33.1* 34.8* 35.6*  35.6*   MCV 90 89 88  88   MCH 30.2 29.4 29.3  29.3   MCHC 33.5 33.0 33.1  33.1   RDW 15.0* 15.2* 15.0*  15.0*    151 172  172   MPV 9.8 9.9 10.5  10.5   GRAN 75.0*  5.7 78.0*  6.7 71.3  71.3  5.3  5.3   LYMPH 11.8*  0.9* 12.2*  1.1 15.9*  15.9*  1.2  1.2   MONO 9.7  0.7 6.6  0.6 8.2  8.2  0.6  0.6   BASO 0.07 0.06 0.07  0.07   NRBC 0 0 0  0        COAGULATION LAST 3 DAYS  Recent Labs   Lab 06/08/22  0537 06/08/22  1746 06/09/22  0006 06/09/22  0814   LABPT 15.0* 15.6*  --   --    INR 1.3 1.3  --   --    APTT 26.8 26.1 53.1* 100.4*       CHEMISTRY LAST 3 DAYS  Recent Labs   Lab 06/07/22 2207 06/09/22  0813    136   K 4.6 4.7    104   CO2 19* 22*   ANIONGAP 10 10   BUN 29* 39*   CREATININE 1.7* 2.1*   * 192*   CALCIUM 9.0 9.1   MG 1.8 1.7   ALBUMIN 4.3  --    PROT 7.2  --    ALKPHOS 86  --    ALT 22  --    AST 22  --    BILITOT 0.8  --        CARDIAC PROFILE LAST 3 DAYS  Recent Labs   Lab 06/07/22 2207 06/08/22  0354 06/08/22  0956 06/08/22  1556 06/09/22  0814   *  --   --   --   --    TROPONINI 0.045*   < > 3.241* 4.205* 2.829*    < > = values in this interval not displayed.       ENDOCRINE LAST 3 DAYS  No results for input(s): TSH, PROCAL in the last 168 hours.    LAST ARTERIAL BLOOD GAS  ABG  No results for input(s): PH, PO2, PCO2, HCO3, BE in the last 168 hours.    LAST 7 DAYS MICROBIOLOGY   Microbiology Results (last 7 days)       ** No results found for the last 168 hours. **            MOST RECENT IMAGING  X-Ray Chest AP Portable  XR CHEST 1 VIEW    CLINICAL HISTORY:  71 years Male chf    COMPARISON: 6/7/2022    FINDINGS: Cardiomediastinal silhouette is within normal limits. There is resolution of the mild interstitial edema. Lungs are normally expanded with no airspace consolidation. No pleural effusion or pneumothorax. No acute osseous abnormality.    IMPRESSION: No acute pulmonary process.    Electronically signed by:  Gaye Mckoy MD  6/9/2022 10:38 AM CDT Workstation: [id number]      ECHOCARDIOGRAM RESULTS (last 5)  Results for orders placed during the hospital encounter of 04/18/22    Echo    Interpretation Summary  · The left ventricle is normal in size with low normal systolic function.  · The estimated ejection fraction is 50%.  · Normal left ventricular diastolic function.  · Normal right ventricular  size with normal right ventricular systolic function.      Results for orders placed during the hospital encounter of 11/26/21    Echo    Interpretation Summary  · The left ventricle is normal in size with severe concentric hypertrophy and mildly decreased systolic function.  · The estimated ejection fraction is 45%. There appears to be inferolateral hypokinesis.  · Indeterminate left ventricular diastolic function.  · Normal right ventricular size with normal right ventricular systolic function.  · Normal central venous pressure (3 mmHg).      CURRENT/PREVIOUS VISIT EKG  Results for orders placed or performed during the hospital encounter of 06/07/22   EKG 12-lead    Collection Time: 06/07/22 10:04 PM    Narrative    Test Reason : R07.9,    Vent. Rate : 096 BPM     Atrial Rate : 097 BPM     P-R Int : 164 ms          QRS Dur : 148 ms      QT Int : 392 ms       P-R-T Axes : 067 003 162 degrees     QTc Int : 495 ms    Normal sinus rhythm  Left bundle branch block  Abnormal ECG  When compared with ECG of 18-APR-2022 01:38,  T wave inversion less evident in Inferior leads    Referred By: [email or web address]   SELF           Confirmed By:            ASSESSMENT/PLAN:     Active Hospital Problems    Diagnosis    *Acute on chronic systolic CHF (congestive heart failure)    Hypertensive emergency    Type 2 diabetes mellitus with stage 3b chronic kidney disease, without long-term current use of insulin    Coronary artery disease       ASSESSMENT & PLAN:     1. Acute CHF exacerbation- Improved  2.  NSTEMI  3. LVEF 40%  4. H/O CAD with PCI RCA and PDA- Plan for brachytherapy  5. History of lesion to MID LAD 75%- small caliber  6. HTN Emergency on admit  7. DM  8. EMILEE on CKD    RECOMMENDATIONS:      Patient can go home on Brilinta,  ASA, and statin therapy  Continue Coreg, Hydralazine, and Isordil at present dosing  Give Lasix 20 mg PRN Weight gain > 2 pounds in 1 day or SOB  Currently EMILEE hold off on entresto for now  Follow low salt  diet  He will follow up with Dr. Vernon when he gets out of the hospital  Goal for BP < 130 at all times        Geovanna Nunez NP  [facility]  Department of Cardiology  Date of Service: 06/09/2022      71-year-old gentleman with history of significant coronary artery disease status post revascularization procedures right coronary artery and right PDA was readmitted with symptoms of acute decompensation of congestive heart failure.  I have reviewed all his angiographic evaluations and results of last intervention appear to be good.  Lad appear to be diffusely disease in the mid and distal segment with very small caliber vessel.  His echocardiogram shows decrease in LV function.  Probable cause for his symptoms  Hypertensive emergency on admission probably precipitated some of his acute symptoms.  His  Echocardiogram at present showsThe left ventricle is normal in size with concentric remodeling and mildly decreased systolic function.  The estimated ejection fraction is 40%.  Left ventricular diastolic dysfunction.  There is abnormal septal wall motion consistent with left bundle branch block.  Normal right ventricular size with normal right ventricular systolic function.  Mild left atrial enlargement.  Mild tricuspid regurgitation.  Normal central venous pressure (3 mmHg).    Patient has remained symptom-free overnight.  And troponins are declining.  As patient remains hemodynamically stable and given his renal condition would prefer to defer angiographic assessment for time being to avoid duplication of work patient has scheduled appointment with Dr. Vernon for brachytherapy.  Increased activity on the floor remains stable he can be discharged  on optimize medical therapy       I have personally interviewed and examined the patient, I have reviewed the Nurse Practitioner's history and physical, assessment, and plan. I agree with the findings and plan with above changes  .      Yousif Sofia  M.D.  [facility]  Department of Cardiology  Date of Service: 06/09/2022  10:10 AM

## 2022-06-09 NOTE — CARE UPDATE
06/08/22 1950   Patient Assessment/Suction   Level of Consciousness (AVPU) alert   Respiratory Effort Normal;Unlabored   Expansion/Accessory Muscles/Retractions no use of accessory muscles   PRE-TX-O2   O2 Device (Oxygen Therapy) room air   SpO2 98 %   Pulse Oximetry Type Continuous   $ Pulse Oximetry - Multiple Charge Pulse Oximetry - Multiple   Pulse 68   Maintain adequate oxygenation

## 2022-06-09 NOTE — PLAN OF CARE
Attempted to schedule PHV. Spoke with Kirstie/PCP office  and she stated next available appt in July. She stated she will send message for PCP office to call pt with PHV appt.     Discharge orders and chart reviewed with no further post-acute discharge needs identified at this time.  At this time, patient is cleared for discharge from Case Management standpoint.        06/09/22 1517   Final Note   Assessment Type Final Discharge Note   Anticipated Discharge Disposition Home   What phone number can be called within the next 1-3 days to see how you are doing after discharge? [phone]   Post-Acute Status   Discharge Delays None known at this time

## 2022-06-09 NOTE — NURSING
Pt verbalized understanding of discharge education and instructions, prescription brought to pt prior to discharge. Tele box returned to monitor tech. Pt ambulated off unit with no signs of distress.

## 2022-06-09 NOTE — PLAN OF CARE
06/09/22 1507   Medicare Message   Important Message from Medicare regarding Discharge Appeal Rights Explained to patient/caregiver;Signed/date by patient/caregiver   Date IMM was signed 06/09/22   Time IMM was signed 1154

## 2022-06-09 NOTE — CARE UPDATE
06/09/22 0922   PRE-TX-O2   O2 Device (Oxygen Therapy) room air   Pulse Oximetry Type Continuous   $ Pulse Oximetry - Multiple Charge Pulse Oximetry - Multiple   Resp 15   Respiratory Evaluation   $ Care Plan Tech Time 15 min

## 2022-06-09 NOTE — TELEPHONE ENCOUNTER
----- Message from Niharika Smith sent at 6/9/2022  3:13 PM CDT -----  Type: Patient Call Back       Who called: [facility]         What is the request in detail: calling to sched patient for 7-10 day hfu; discharge 6/9; please advise           Best call back number: [phone]         Additional Information:            Thank You

## 2022-06-10 ENCOUNTER — TELEPHONE (OUTPATIENT)
Dept: FAMILY MEDICINE | Facility: CLINIC | Age: 72
End: 2022-06-10
Payer: MEDICARE

## 2022-06-10 RX ORDER — GLIPIZIDE 10 MG/1
10 TABLET ORAL 2 TIMES DAILY WITH MEALS
Qty: 180 TABLET | Refills: 1 | Status: SHIPPED | OUTPATIENT
Start: 2022-06-10 | End: 2022-08-03 | Stop reason: SDUPTHER

## 2022-06-10 NOTE — HOSPITAL COURSE
Patient is a 71-year-old  male with known ASCVD.  His most recent angiography was in April 2022 during which he underwent angioplasty of the right PDA and distal RCA.  He is scheduled to see Dr. Vernon from [facility] for coronary brachytherapy.    He presented here with shortness of breath.  Found to be in decompensated systolic congestive heart failure as well as NSTEMI.  Troponin peaked at 4.205 ng/mL.  Evaluated by Cardiology.  He was briefly on heparin drip.  Initial plan was for PCI however given improvement in symptoms no further intervention was required.  Medical management has been recommended.  He has been initiated on furosemide 20 mg once daily as needed for weight gain, peripheral edema or shortness of breath.  Advised to continue other medications as outlined below including DAPT for recent CAD.  He is aware to follow with Dr. Vernon for further intervention.  Return precautions discussed.    Physical exam on the day of discharge:  General: Patient resting comfortably in no acute distress.  Lungs:  No tachypnea or accessory muscle use  Cor: Regular rate and rhythm. Trace pedal edema.  Abd: Soft. Nontender. Non-distended.  Neuro: A&O x3. Moving all 4 extremities equally  Ext: No clubbing. No cyanosis.

## 2022-06-10 NOTE — TELEPHONE ENCOUNTER
----- Message from Aide Stroud sent at 6/10/2022  9:09 AM CDT -----  .Type:  Patient Returning Call    Who Called: PT     Who Left Message for Patient:  PAUL JACOBO     Does the patient know what this is regarding?: SCHEDULING     Would the patient rather a call back YES      Best Call Back Number: [phone]    Additional Information:  NONE

## 2022-06-10 NOTE — TELEPHONE ENCOUNTER
No new care gaps identified.  [facility] Embedded Care Gaps. Reference number: [id number]. 6/10/2022   9:37:00 AM CDT

## 2022-06-10 NOTE — TELEPHONE ENCOUNTER
----- Message from Aide Stroud sent at 6/10/2022  9:09 AM CDT -----  .Type:  Patient Returning Call    Who Called: PT     Who Left Message for Patient:  PAUL JACOBO     Does the patient know what this is regarding?: SCHEDULING     Would the patient rather a call back YES      Best Call Back Number: [phone]    Additional Information:  NONE

## 2022-06-10 NOTE — TELEPHONE ENCOUNTER
----- Message from Aide Stroud sent at 6/10/2022  9:09 AM CDT -----  .Type:  Patient Returning Call    Who Called: PT     Who Left Message for Patient:  PAUL JACOBO     Does the patient know what this is regarding?: SCHEDULING     Would the patient rather a call back YES      Best Call Back Number: [phone]    Additional Information:  NONE

## 2022-06-10 NOTE — DISCHARGE SUMMARY
[facility] Medicine  Discharge Summary      Patient Name: Amadeo Levin  MRN: [id number]  Patient Class: IP- Inpatient  Admission Date: 6/7/2022  Hospital Length of Stay: 1 days  Discharge Date and Time: 6/9/2022  3:00 PM  Attending Physician: No att. providers found   Discharging Provider: Rudy Jaffe MD  Primary Care Provider: Angelito Adams MD      Hospital Course:   Patient is a 71-year-old  male with known ASCVD.  His most recent angiography was in April 2022 during which he underwent angioplasty of the right PDA and distal RCA.  He is scheduled to see Dr. Vernon from [facility] for coronary brachytherapy.    He presented here with shortness of breath.  Found to be in decompensated systolic congestive heart failure as well as NSTEMI.  Troponin peaked at 4.205 ng/mL.  Evaluated by Cardiology.  He was briefly on heparin drip.  Initial plan was for PCI however given improvement in symptoms no further intervention was required.  Medical management has been recommended.  He has been initiated on furosemide 20 mg once daily as needed for weight gain, peripheral edema or shortness of breath.  Advised to continue other medications as outlined below including DAPT for recent CAD.  He is aware to follow with Dr. Vernon for further intervention.  Return precautions discussed.    Physical exam on the day of discharge:  General: Patient resting comfortably in no acute distress.  Lungs:  No tachypnea or accessory muscle use  Cor: Regular rate and rhythm. Trace pedal edema.  Abd: Soft. Nontender. Non-distended.  Neuro: A&O x3. Moving all 4 extremities equally  Ext: No clubbing. No cyanosis.          Goals of Care Treatment Preferences:  Code Status: Full Code      Consults:   Consults (From admission, onward)        Status Ordering Provider     Inpatient consult to Cardiology  Once        Provider:  Dave Vernon MD    Completed SHANNON LI     Inpatient consult to  Registered Dietitian/Nutritionist  Once        Provider:  (Not yet assigned)    SHANNON Villavicencio          No new Assessment & Plan notes have been filed under this hospital service since the last note was generated.  Service: Hospital Medicine    Final Active Diagnoses:    Diagnosis Date Noted POA    Acute renal failure superimposed on stage 3a chronic kidney disease [N17.9, N18.31] 04/18/2022 Yes    Type 2 diabetes mellitus with stage 3b chronic kidney disease, without long-term current use of insulin [E11.22, N18.32] 12/03/2013 Yes    Coronary artery disease [I25.10]  Yes      Problems Resolved During this Admission:    Diagnosis Date Noted Date Resolved POA    PRINCIPAL PROBLEM:  Acute on chronic systolic CHF (congestive heart failure) [I50.23] 11/27/2021 06/09/2022 Yes    Hypertensive emergency [I16.1] 11/27/2021 06/09/2022 Yes       Discharged Condition: stable    Disposition: Home or Self Care    Follow Up:   Follow-up Information     Angelito Adams MD Follow up in 4 week(s).    Specialty: Family Medicine  Why: As needed  Contact information:  [address]  [facility] [address]  [phone]             Gunnar Vernon MD. Schedule an appointment as soon as possible for a visit in 2 week(s).    Specialty: Interventional Cardiology  Why: Cardiology follow-up  Contact information:  [address]  [facility] [address]  [phone]                       Patient Instructions:      Diet Adult Regular     Order Specific Question Answer Comments   Na restriction, if any: 2gNa    Fluid restriction: Fluid - 2000mL      Notify your health care provider if you experience any of the following:  severe uncontrolled pain     Notify your health care provider if you experience any of the following:  difficulty breathing or increased cough     Notify your health care provider if you experience any of the following:  persistent dizziness, light-headedness, or visual disturbances     Notify your  health care provider if you experience any of the following:  increased confusion or weakness     Activity as tolerated       Significant Diagnostic Studies: Labs:   CMP   Recent Labs   Lab 06/09/22  0813      K 4.7      CO2 22*   *   BUN 39*   CREATININE 2.1*   CALCIUM 9.1   ANIONGAP 10   ESTGFRAFRICA 35.0*   EGFRNONAA 30.3*   , CBC   Recent Labs   Lab 06/08/22  1746 06/09/22  0814   WBC 8.63 7.44  7.44   HGB 11.5* 11.8*  11.8*   HCT 34.8* 35.6*  35.6*    172  172    and Troponin   Recent Labs   Lab 06/08/22  0956 06/08/22  1556 06/09/22  0814   TROPONINI 3.241* 4.205* 2.829*       Pending Diagnostic Studies:     Procedure Component Value Units Date/Time    EKG 12-lead [[id number]]     Order Status: Sent Lab Status: No result          Medications:  Reconciled Home Medications:      Medication List      START taking these medications    furosemide 20 MG tablet  Commonly known as: LASIX  Take 1 tablet (20 mg total) by mouth daily as needed (Shortness of breath, leg swelling or weight gain of greater than 3 lb overnight or 5 lb within a week).        CHANGE how you take these medications    metFORMIN 1000 MG tablet  Commonly known as: GLUCOPHAGE  Take 1 tablet (1,000 mg total) by mouth 2 (two) times daily with meals. Please hold medication for 2 more days. Can restart on 12/3/21  What changed: additional instructions        CONTINUE taking these medications    alcohol swabs Padm  Commonly known as: BD ALCOHOL SWABS  Apply 1 each topically as needed.     aspirin 81 MG EC tablet  Commonly known as: ECOTRIN  Take 1 tablet (81 mg total) by mouth once daily.     atorvastatin 80 MG tablet  Commonly known as: LIPITOR  Take 1 tablet (80 mg total) by mouth every evening.     * blood sugar diagnostic Strp  Commonly known as: ACCU-CHEK OLGA PLUS TEST STRP  1 each by Misc.(Non-Drug; Combo Route) route daily as needed.     * blood sugar diagnostic Strp  To check BG 2 times daily, to use with insurance  preferred meter     BRILINTA 90 mg tablet  Generic drug: ticagrelor  Take 1 tablet (90 mg total) by mouth 2 (two) times a day.     carvediloL 12.5 MG tablet  Commonly known as: COREG  Take 1 tablet (12.5 mg total) by mouth 2 (two) times daily with meals.     CENTRUM SILVER ORAL  Take 1 capsule by mouth once daily.     hydrALAZINE 25 MG tablet  Commonly known as: APRESOLINE  Take 1 tablet (25 mg total) by mouth every 8 (eight) hours.     isosorbide dinitrate 20 MG tablet  Commonly known as: ISORDIL  Take 2 tablets (40 mg total) by mouth 3 (three) times daily.     lancets Misc  To check BG 2 times daily, to use with insurance preferred meter     SITagliptin 100 MG Tab  Commonly known as: JANUVIA  Take 1 tablet (100 mg total) by mouth once daily.         * This list has 2 medication(s) that are the same as other medications prescribed for you. Read the directions carefully, and ask your doctor or other care provider to review them with you.            STOP taking these medications    clopidogreL 75 mg tablet  Commonly known as: PLAVIX     valsartan 80 MG tablet  Commonly known as: DIOVAN            Indwelling Lines/Drains at time of discharge:   Lines/Drains/Airways     None                 Time spent on the discharge of patient: 35 minutes         Rudy Jaffe MD  Department of Hospital Medicine  [facility]

## 2022-06-15 ENCOUNTER — HOSPITAL ENCOUNTER (INPATIENT)
Facility: HOSPITAL | Age: 72
LOS: 2 days | Discharge: HOME | DRG: 281 | End: 2022-06-17
Attending: EMERGENCY MEDICINE | Admitting: INTERNAL MEDICINE
Payer: MEDICARE

## 2022-06-15 DIAGNOSIS — I21.4 NSTEMI (NON-ST ELEVATED MYOCARDIAL INFARCTION): ICD-10-CM

## 2022-06-15 DIAGNOSIS — R07.9 CHEST PAIN: ICD-10-CM

## 2022-06-15 DIAGNOSIS — I21.4 NSTEMI (NON-ST ELEVATION MYOCARDIAL INFARCTION): Primary | ICD-10-CM

## 2022-06-15 LAB
ALBUMIN SERPL BCP-MCNC: 4.1 G/DL (ref 3.5–5.2)
ALBUMIN SERPL BCP-MCNC: 4.5 G/DL (ref 3.5–5.2)
ALP SERPL-CCNC: 82 U/L (ref 55–135)
ALP SERPL-CCNC: 84 U/L (ref 55–135)
ALT SERPL W/O P-5'-P-CCNC: 42 U/L (ref 10–44)
ALT SERPL W/O P-5'-P-CCNC: 44 U/L (ref 10–44)
ANION GAP SERPL CALC-SCNC: 10 MMOL/L (ref 8–16)
ANION GAP SERPL CALC-SCNC: 13 MMOL/L (ref 8–16)
APTT PPP: 25 SEC (ref 23.3–35.1)
APTT PPP: 39.5 SEC (ref 23.3–35.1)
AST SERPL-CCNC: 39 U/L (ref 10–40)
AST SERPL-CCNC: 93 U/L (ref 10–40)
BASOPHILS # BLD AUTO: 0.08 K/UL (ref 0–0.2)
BASOPHILS NFR BLD: 0.9 % (ref 0–1.9)
BILIRUB SERPL-MCNC: 0.7 MG/DL (ref 0.1–1)
BILIRUB SERPL-MCNC: 0.9 MG/DL (ref 0.1–1)
BNP SERPL-MCNC: 228 PG/ML (ref 0–99)
BUN SERPL-MCNC: 41 MG/DL (ref 8–23)
BUN SERPL-MCNC: 43 MG/DL (ref 8–23)
CALCIUM SERPL-MCNC: 8.7 MG/DL (ref 8.7–10.5)
CALCIUM SERPL-MCNC: 9.1 MG/DL (ref 8.7–10.5)
CHLORIDE SERPL-SCNC: 102 MMOL/L (ref 95–110)
CHLORIDE SERPL-SCNC: 104 MMOL/L (ref 95–110)
CO2 SERPL-SCNC: 20 MMOL/L (ref 23–29)
CO2 SERPL-SCNC: 23 MMOL/L (ref 23–29)
CREAT SERPL-MCNC: 1.9 MG/DL (ref 0.5–1.4)
CREAT SERPL-MCNC: 2.2 MG/DL (ref 0.5–1.4)
DIFFERENTIAL METHOD: ABNORMAL
EOSINOPHIL # BLD AUTO: 0.3 K/UL (ref 0–0.5)
EOSINOPHIL NFR BLD: 2.9 % (ref 0–8)
ERYTHROCYTE [DISTWIDTH] IN BLOOD BY AUTOMATED COUNT: 14.5 % (ref 11.5–14.5)
EST. GFR  (AFRICAN AMERICAN): 33.1 ML/MIN/1.73 M^2
EST. GFR  (AFRICAN AMERICAN): 39.6 ML/MIN/1.73 M^2
EST. GFR  (NON AFRICAN AMERICAN): 28.7 ML/MIN/1.73 M^2
EST. GFR  (NON AFRICAN AMERICAN): 34.2 ML/MIN/1.73 M^2
GLUCOSE SERPL-MCNC: 173 MG/DL (ref 70–110)
GLUCOSE SERPL-MCNC: 185 MG/DL (ref 70–110)
GLUCOSE SERPL-MCNC: 224 MG/DL (ref 70–110)
GLUCOSE SERPL-MCNC: 303 MG/DL (ref 70–110)
HCT VFR BLD AUTO: 36.7 % (ref 40–54)
HGB BLD-MCNC: 12.3 G/DL (ref 14–18)
IMM GRANULOCYTES # BLD AUTO: 0.03 K/UL (ref 0–0.04)
IMM GRANULOCYTES NFR BLD AUTO: 0.3 % (ref 0–0.5)
INR PPP: 1.3
LYMPHOCYTES # BLD AUTO: 1.1 K/UL (ref 1–4.8)
LYMPHOCYTES NFR BLD: 13.1 % (ref 18–48)
MAGNESIUM SERPL-MCNC: 1.7 MG/DL (ref 1.6–2.6)
MCH RBC QN AUTO: 30.3 PG (ref 27–31)
MCHC RBC AUTO-ENTMCNC: 33.5 G/DL (ref 32–36)
MCV RBC AUTO: 90 FL (ref 82–98)
MONOCYTES # BLD AUTO: 0.6 K/UL (ref 0.3–1)
MONOCYTES NFR BLD: 7 % (ref 4–15)
NEUTROPHILS # BLD AUTO: 6.6 K/UL (ref 1.8–7.7)
NEUTROPHILS NFR BLD: 75.8 % (ref 38–73)
NRBC BLD-RTO: 0 /100 WBC
PLATELET # BLD AUTO: 188 K/UL (ref 150–450)
PMV BLD AUTO: 10.4 FL (ref 9.2–12.9)
POTASSIUM SERPL-SCNC: 4.5 MMOL/L (ref 3.5–5.1)
POTASSIUM SERPL-SCNC: 4.6 MMOL/L (ref 3.5–5.1)
PROT SERPL-MCNC: 7 G/DL (ref 6–8.4)
PROT SERPL-MCNC: 7.6 G/DL (ref 6–8.4)
PROTHROMBIN TIME: 15.2 SEC (ref 11.4–13.7)
RBC # BLD AUTO: 4.06 M/UL (ref 4.6–6.2)
SARS-COV-2 RDRP RESP QL NAA+PROBE: NEGATIVE
SODIUM SERPL-SCNC: 135 MMOL/L (ref 136–145)
SODIUM SERPL-SCNC: 137 MMOL/L (ref 136–145)
TROPONIN I SERPL DL<=0.01 NG/ML-MCNC: 0.07 NG/ML
WBC # BLD AUTO: 8.69 K/UL (ref 3.9–12.7)

## 2022-06-15 PROCEDURE — 85025 COMPLETE CBC W/AUTO DIFF WBC: CPT | Performed by: EMERGENCY MEDICINE

## 2022-06-15 PROCEDURE — 25000003 PHARM REV CODE 250: Performed by: INTERNAL MEDICINE

## 2022-06-15 PROCEDURE — 63600175 PHARM REV CODE 636 W HCPCS: Performed by: INTERNAL MEDICINE

## 2022-06-15 PROCEDURE — 21400001 HC TELEMETRY ROOM

## 2022-06-15 PROCEDURE — 96374 THER/PROPH/DIAG INJ IV PUSH: CPT

## 2022-06-15 PROCEDURE — 85610 PROTHROMBIN TIME: CPT | Performed by: INTERNAL MEDICINE

## 2022-06-15 PROCEDURE — 99223 PR INITIAL HOSPITAL CARE,LEVL III: ICD-10-PCS | Mod: ,,, | Performed by: INTERNAL MEDICINE

## 2022-06-15 PROCEDURE — 36415 COLL VENOUS BLD VENIPUNCTURE: CPT

## 2022-06-15 PROCEDURE — 93005 ELECTROCARDIOGRAM TRACING: CPT | Performed by: SPECIALIST

## 2022-06-15 PROCEDURE — 25000003 PHARM REV CODE 250: Performed by: EMERGENCY MEDICINE

## 2022-06-15 PROCEDURE — 80053 COMPREHEN METABOLIC PANEL: CPT | Performed by: EMERGENCY MEDICINE

## 2022-06-15 PROCEDURE — 99223 1ST HOSP IP/OBS HIGH 75: CPT | Mod: ,,, | Performed by: INTERNAL MEDICINE

## 2022-06-15 PROCEDURE — 36415 COLL VENOUS BLD VENIPUNCTURE: CPT | Performed by: INTERNAL MEDICINE

## 2022-06-15 PROCEDURE — 96375 TX/PRO/DX INJ NEW DRUG ADDON: CPT

## 2022-06-15 PROCEDURE — 99284 EMERGENCY DEPT VISIT MOD MDM: CPT | Mod: 25

## 2022-06-15 PROCEDURE — 93010 EKG 12-LEAD: ICD-10-PCS | Mod: ,,, | Performed by: SPECIALIST

## 2022-06-15 PROCEDURE — 85730 THROMBOPLASTIN TIME PARTIAL: CPT | Mod: 91 | Performed by: INTERNAL MEDICINE

## 2022-06-15 PROCEDURE — 80053 COMPREHEN METABOLIC PANEL: CPT | Mod: 91

## 2022-06-15 PROCEDURE — 83735 ASSAY OF MAGNESIUM: CPT | Performed by: EMERGENCY MEDICINE

## 2022-06-15 PROCEDURE — 83880 ASSAY OF NATRIURETIC PEPTIDE: CPT | Performed by: EMERGENCY MEDICINE

## 2022-06-15 PROCEDURE — U0002 COVID-19 LAB TEST NON-CDC: HCPCS | Performed by: EMERGENCY MEDICINE

## 2022-06-15 PROCEDURE — 84484 ASSAY OF TROPONIN QUANT: CPT | Performed by: EMERGENCY MEDICINE

## 2022-06-15 PROCEDURE — 63600175 PHARM REV CODE 636 W HCPCS: Performed by: EMERGENCY MEDICINE

## 2022-06-15 PROCEDURE — 93010 ELECTROCARDIOGRAM REPORT: CPT | Mod: ,,, | Performed by: SPECIALIST

## 2022-06-15 RX ORDER — ACETAMINOPHEN 325 MG/1
650 TABLET ORAL EVERY 8 HOURS PRN
Status: DISCONTINUED | OUTPATIENT
Start: 2022-06-15 | End: 2022-06-17 | Stop reason: HOSPADM

## 2022-06-15 RX ORDER — LABETALOL HYDROCHLORIDE 5 MG/ML
10 INJECTION, SOLUTION INTRAVENOUS EVERY 6 HOURS PRN
Status: DISCONTINUED | OUTPATIENT
Start: 2022-06-15 | End: 2022-06-15 | Stop reason: SDUPTHER

## 2022-06-15 RX ORDER — TALC
6 POWDER (GRAM) TOPICAL NIGHTLY PRN
Status: DISCONTINUED | OUTPATIENT
Start: 2022-06-15 | End: 2022-06-17 | Stop reason: HOSPADM

## 2022-06-15 RX ORDER — SODIUM,POTASSIUM PHOSPHATES 280-250MG
2 POWDER IN PACKET (EA) ORAL
Status: DISCONTINUED | OUTPATIENT
Start: 2022-06-15 | End: 2022-06-17 | Stop reason: HOSPADM

## 2022-06-15 RX ORDER — SODIUM CHLORIDE 0.9 % (FLUSH) 0.9 %
10 SYRINGE (ML) INJECTION
Status: DISCONTINUED | OUTPATIENT
Start: 2022-06-15 | End: 2022-06-17 | Stop reason: HOSPADM

## 2022-06-15 RX ORDER — ATORVASTATIN CALCIUM 40 MG/1
80 TABLET, FILM COATED ORAL NIGHTLY
Status: DISCONTINUED | OUTPATIENT
Start: 2022-06-15 | End: 2022-06-17 | Stop reason: HOSPADM

## 2022-06-15 RX ORDER — HEPARIN SODIUM,PORCINE/D5W 25000/250
0-40 INTRAVENOUS SOLUTION INTRAVENOUS CONTINUOUS
Status: DISCONTINUED | OUTPATIENT
Start: 2022-06-15 | End: 2022-06-17 | Stop reason: HOSPADM

## 2022-06-15 RX ORDER — ISOSORBIDE DINITRATE 10 MG/1
40 TABLET ORAL 3 TIMES DAILY
Status: DISCONTINUED | OUTPATIENT
Start: 2022-06-15 | End: 2022-06-17 | Stop reason: HOSPADM

## 2022-06-15 RX ORDER — LABETALOL HYDROCHLORIDE 5 MG/ML
10 INJECTION, SOLUTION INTRAVENOUS EVERY 6 HOURS PRN
Status: DISCONTINUED | OUTPATIENT
Start: 2022-06-15 | End: 2022-06-17 | Stop reason: HOSPADM

## 2022-06-15 RX ORDER — ASPIRIN 81 MG/1
81 TABLET ORAL DAILY
Status: DISCONTINUED | OUTPATIENT
Start: 2022-06-16 | End: 2022-06-17 | Stop reason: HOSPADM

## 2022-06-15 RX ORDER — INSULIN ASPART 100 [IU]/ML
1-12 INJECTION, SOLUTION INTRAVENOUS; SUBCUTANEOUS
Status: DISCONTINUED | OUTPATIENT
Start: 2022-06-15 | End: 2022-06-17 | Stop reason: HOSPADM

## 2022-06-15 RX ORDER — HYDRALAZINE HYDROCHLORIDE 25 MG/1
25 TABLET, FILM COATED ORAL EVERY 8 HOURS
Status: DISCONTINUED | OUTPATIENT
Start: 2022-06-15 | End: 2022-06-16

## 2022-06-15 RX ORDER — CARVEDILOL 12.5 MG/1
12.5 TABLET ORAL 2 TIMES DAILY WITH MEALS
Status: DISCONTINUED | OUTPATIENT
Start: 2022-06-15 | End: 2022-06-17 | Stop reason: HOSPADM

## 2022-06-15 RX ORDER — SODIUM CHLORIDE 9 MG/ML
INJECTION, SOLUTION INTRAVENOUS ONCE
Status: COMPLETED | OUTPATIENT
Start: 2022-06-15 | End: 2022-06-16

## 2022-06-15 RX ORDER — POLYETHYLENE GLYCOL 3350 17 G/17G
17 POWDER, FOR SOLUTION ORAL DAILY PRN
Status: DISCONTINUED | OUTPATIENT
Start: 2022-06-15 | End: 2022-06-17 | Stop reason: HOSPADM

## 2022-06-15 RX ORDER — PANTOPRAZOLE SODIUM 40 MG/10ML
40 INJECTION, POWDER, LYOPHILIZED, FOR SOLUTION INTRAVENOUS DAILY
Status: DISCONTINUED | OUTPATIENT
Start: 2022-06-16 | End: 2022-06-17 | Stop reason: HOSPADM

## 2022-06-15 RX ORDER — ONDANSETRON 2 MG/ML
4 INJECTION INTRAMUSCULAR; INTRAVENOUS EVERY 8 HOURS PRN
Status: DISCONTINUED | OUTPATIENT
Start: 2022-06-15 | End: 2022-06-17 | Stop reason: HOSPADM

## 2022-06-15 RX ORDER — DIPHENHYDRAMINE HCL 25 MG
50 CAPSULE ORAL
Status: DISCONTINUED | OUTPATIENT
Start: 2022-06-15 | End: 2022-06-17 | Stop reason: HOSPADM

## 2022-06-15 RX ORDER — FUROSEMIDE 10 MG/ML
40 INJECTION INTRAMUSCULAR; INTRAVENOUS ONCE
Status: COMPLETED | OUTPATIENT
Start: 2022-06-15 | End: 2022-06-15

## 2022-06-15 RX ORDER — ASPIRIN 325 MG
325 TABLET ORAL
Status: COMPLETED | OUTPATIENT
Start: 2022-06-15 | End: 2022-06-15

## 2022-06-15 RX ORDER — LANOLIN ALCOHOL/MO/W.PET/CERES
800 CREAM (GRAM) TOPICAL
Status: DISCONTINUED | OUTPATIENT
Start: 2022-06-15 | End: 2022-06-17 | Stop reason: HOSPADM

## 2022-06-15 RX ORDER — NALOXONE HCL 0.4 MG/ML
0.02 VIAL (ML) INJECTION
Status: DISCONTINUED | OUTPATIENT
Start: 2022-06-15 | End: 2022-06-17 | Stop reason: HOSPADM

## 2022-06-15 RX ORDER — HYDROMORPHONE HYDROCHLORIDE 1 MG/ML
1 INJECTION, SOLUTION INTRAMUSCULAR; INTRAVENOUS; SUBCUTANEOUS
Status: COMPLETED | OUTPATIENT
Start: 2022-06-15 | End: 2022-06-15

## 2022-06-15 RX ORDER — ONDANSETRON 2 MG/ML
4 INJECTION INTRAMUSCULAR; INTRAVENOUS
Status: COMPLETED | OUTPATIENT
Start: 2022-06-15 | End: 2022-06-15

## 2022-06-15 RX ADMIN — CARVEDILOL 12.5 MG: 12.5 TABLET, FILM COATED ORAL at 05:06

## 2022-06-15 RX ADMIN — FUROSEMIDE 40 MG: 10 INJECTION, SOLUTION INTRAVENOUS at 07:06

## 2022-06-15 RX ADMIN — INSULIN ASPART 2 UNITS: 100 INJECTION, SOLUTION INTRAVENOUS; SUBCUTANEOUS at 09:06

## 2022-06-15 RX ADMIN — TICAGRELOR 90 MG: 90 TABLET ORAL at 09:06

## 2022-06-15 RX ADMIN — LABETALOL HYDROCHLORIDE 10 MG: 5 INJECTION, SOLUTION INTRAVENOUS at 01:06

## 2022-06-15 RX ADMIN — ONDANSETRON 4 MG: 2 INJECTION INTRAMUSCULAR; INTRAVENOUS at 11:06

## 2022-06-15 RX ADMIN — HYDRALAZINE HYDROCHLORIDE 25 MG: 25 TABLET ORAL at 09:06

## 2022-06-15 RX ADMIN — ATORVASTATIN CALCIUM 80 MG: 40 TABLET, FILM COATED ORAL at 09:06

## 2022-06-15 RX ADMIN — ASPIRIN 325 MG ORAL TABLET 325 MG: 325 PILL ORAL at 10:06

## 2022-06-15 RX ADMIN — ISOSORBIDE DINITRATE 40 MG: 10 TABLET ORAL at 09:06

## 2022-06-15 RX ADMIN — NITROGLYCERIN 1 INCH: 20 OINTMENT TOPICAL at 10:06

## 2022-06-15 RX ADMIN — HYDROMORPHONE HYDROCHLORIDE 1 MG: 1 INJECTION, SOLUTION INTRAMUSCULAR; INTRAVENOUS; SUBCUTANEOUS at 11:06

## 2022-06-15 RX ADMIN — HEPARIN SODIUM 12 UNITS/KG/HR: 10000 INJECTION, SOLUTION INTRAVENOUS at 05:06

## 2022-06-15 RX ADMIN — HYDRALAZINE HYDROCHLORIDE 25 MG: 25 TABLET ORAL at 03:06

## 2022-06-15 RX ADMIN — INSULIN ASPART 4 UNITS: 100 INJECTION, SOLUTION INTRAVENOUS; SUBCUTANEOUS at 05:06

## 2022-06-15 RX ADMIN — ISOSORBIDE DINITRATE 40 MG: 10 TABLET ORAL at 03:06

## 2022-06-15 NOTE — CONSULTS
[facility]  Department of Cardiology  Consult Note      PATIENT NAME: Amadeo Levin  MRN: [id number]  TODAY'S DATE: 06/15/2022  ADMIT DATE: 6/15/2022                          CONSULT REQUESTED BY: Rudy Jaffe MD    SUBJECTIVE     PRINCIPAL PROBLEM: NSTEMI (non-ST elevated myocardial infarction)      REASON FOR CONSULT:  NSTEMI     HPI:  71 year old Mw with a PMHc of CAD who is in need of brachytherapy at [facility] with Dr. mae came into the ED again due to chest pain after gardening. He describes it as burning in nature. His Cr is 2.3 today. Initial troponin is 0.074 which is lower than his admission last week when it peaked at 4.2. He was treated medically then and diuresed. In April he had a complex PCI with kidding angioplasty of right PDA and distal RCA. He is currently chest pain free and is very lethargic from the IV morphine given to him in the ED.     Per H and P:   Patient is a 71-year-old  male with known CAD, recent angioplasty in April 2022, CKD stage 3 and type 2 diabetes mellitus presents to the ED with chief complaint of chest pain.     He was hospitalized here last week for decompensated systolic congestive heart failure and NSTEMI. He was treated medically with heparin drip.  He was initiated on p.r.n. furosemide with improvement in his volume overload.     He presents today with acute episode of substernal chest discomfort which started after gardening.  It is burning in nature with radiation to his midline neck.  No associated diaphoresis or shortness of breath.  Reports compliance with his medications.  Symptoms are moderate in nature.  No exacerbating or relieving factors. He is scheduled to see Dr. Mae at [facility] for brachytherapy of his CAD later this month.     Rest of the 10 point review of systems is negative except as mentioned above.    Review of patient's allergies indicates:   Allergen Reactions    Morphine Nausea And Vomiting    Vicodin  [hydrocodone-acetaminophen] Nausea And Vomiting       Past Medical History:   Diagnosis Date    Coronary artery disease     Diabetes mellitus type II     Hypertension      Past Surgical History:   Procedure Laterality Date    ANGIOGRAM, CORONARY, WITH LEFT HEART CATHETERIZATION N/A 11/27/2021    Procedure: Angiogram, Coronary, with Left Heart Cath;  Surgeon: Dave Vernon MD;  Location: [facility] CATH/EP LAB;  Service: Cardiology;  Laterality: N/A;    ANGIOGRAM, CORONARY, WITH LEFT HEART CATHETERIZATION N/A 4/18/2022    Procedure: Angiogram, Coronary, with Left Heart Cath;  Surgeon: Dave Vernon MD;  Location: [facility] CATH/EP LAB;  Service: Cardiology;  Laterality: N/A;    APPENDECTOMY      CORONARY ANGIOGRAPHY Left 11/30/2021    Procedure: ANGIOGRAM, CORONARY ARTERY;  Surgeon: Gunnar Vernon MD;  Location: [facility] CATH LAB;  Service: Cardiology;  Laterality: Left;    HERNIA REPAIR      LEFT HEART CATHETERIZATION Left 4/20/2022    Procedure: Left heart cath;  Surgeon: Dave Vernon MD;  Location: [facility] CATH/EP LAB;  Service: Cardiology;  Laterality: Left;    PERCUTANEOUS TRANSLUMINAL ANGIOPLASTY N/A 4/20/2022    Procedure: PTA (ANGIOPLASTY, PERCUTANEOUS, TRANSLUMINAL);  Surgeon: Dave Vernon MD;  Location: [facility] CATH/EP LAB;  Service: Cardiology;  Laterality: N/A;    stents       Social History     Tobacco Use    Smoking status: Never Smoker    Smokeless tobacco: Never Used   Substance Use Topics    Alcohol use: Not Currently     Comment: 2 a year    Drug use: No        REVIEW OF SYSTEMS  CONSTITUTIONAL: Negative for chills, fatigue and fever.   EYES: No double vision, No blurred vision  NEURO: No headaches, No dizziness  RESPIRATORY: Negative for cough, shortness of breath and wheezing.    CARDIOVASCULAR: Negative for chest pain. Negative for palpitations and leg swelling.   GI: Negative for abdominal pain, No melena, diarrhea, nausea and vomiting.   : Negative for dysuria and frequency, Negative  for hematuria  SKIN: Negative for bruising, Negative for edema or discoloration noted.   ENDOCRINE: Negative for polyphagia, Negative for heat intolerance, Negative for cold intolerance  PSYCHIATRIC: Negative for depression, Negative for anxiety, Negative for memory loss  MUSCULOSKELETAL: Negative for neck pain, Negative for muscle weakness, Negative for back pain     OBJECTIVE     VITAL SIGNS (Most Recent)  Temp: 97.7 °F (36.5 °C) (06/15/22 1506)  Pulse: 75 (06/15/22 1506)  Resp: 18 (06/15/22 1304)  BP: (!) 175/85 (06/15/22 1506)  SpO2: 98 % (06/15/22 1506)    VENTILATION STATUS  Resp: 18 (06/15/22 1304)  SpO2: 98 % (06/15/22 1506)       I & O (Last 24H):    Intake/Output Summary (Last 24 hours) at 6/15/2022 1614  Last data filed at 6/15/2022 1500  Gross per 24 hour   Intake --   Output 525 ml   Net -525 ml       WEIGHTS  Wt Readings from Last 3 Encounters:   06/15/22 1004 89.8 kg (198 lb)   06/08/22 0612 94 kg (207 lb 3.7 oz)   06/07/22 2200 90.7 kg (200 lb)   06/08/22 1050 94 kg (207 lb 3.7 oz)       PHYSICAL EXAM  GENERAL: well built, well nourished, well-developed in no apparent distress alert and oriented.   HEENT: Normocephalic. Pupils normal and conjunctivae normal.  Mucous membranes normal, no cyanosis or icterus, trachea central,no pallor or icterus is noted..   NECK: No JVD. No bruit..   THYROID: Thyroid not enlarged. No nodules present..   CARDIAC: Regular rate and rhythm. S1 is normal.S2 is normal.No gallops, clicks or murmurs noted at this time.  CHEST ANATOMY: normal.   LUNGS: Clear to auscultation. No wheezing or rhonchi..   ABDOMEN: Soft no masses or organomegaly.  No abdomen pulsations or bruits.  Normal bowel sounds. No pulsations and no masses felt, No guarding or rebound.   URINARY: No fraser catheter   EXTREMITIES: No cyanosis, clubbing or edema noted at this time., no calf tenderness bilaterally.   PERIPHERAL VASCULAR SYSTEM: Good palpable distal pulses.   CENTRAL NERVOUS SYSTEM: No focal motor  or sensory deficits noted.   SKIN: Skin without lesions, moist, well perfused.   MUSCLE STRENGTH & TONE: No noteable weakness, atrophy or abnormal movement.     HOME MEDICATIONS:  No current facility-administered medications on file prior to encounter.     Current Outpatient Medications on File Prior to Encounter   Medication Sig Dispense Refill    alcohol swabs (BD ALCOHOL SWABS) PadM Apply 1 each topically as needed. 100 each 6    aspirin (ECOTRIN) 81 MG EC tablet Take 1 tablet (81 mg total) by mouth once daily. 30 tablet 11    atorvastatin (LIPITOR) 80 MG tablet Take 1 tablet (80 mg total) by mouth every evening. 90 tablet 3    blood sugar diagnostic (ACCU-CHEK OLGA PLUS TEST STRP) Strp 1 each by Misc.(Non-Drug; Combo Route) route daily as needed. 100 each 6    blood sugar diagnostic Strp To check BG 2 times daily, to use with insurance preferred meter 200 strip 2    carvediloL (COREG) 12.5 MG tablet Take 1 tablet (12.5 mg total) by mouth 2 (two) times daily with meals. 180 tablet 3    furosemide (LASIX) 20 MG tablet Take 1 tablet (20 mg total) by mouth daily as needed (Shortness of breath, leg swelling or weight gain of greater than 3 lb overnight or 5 lb within a week). 90 tablet 0    glipiZIDE (GLUCOTROL) 10 MG tablet Take 1 tablet (10 mg total) by mouth 2 (two) times daily with meals. 180 tablet 1    hydrALAZINE (APRESOLINE) 25 MG tablet Take 1 tablet (25 mg total) by mouth every 8 (eight) hours. 90 tablet 1    isosorbide dinitrate (ISORDIL) 20 MG tablet Take 2 tablets (40 mg total) by mouth 3 (three) times daily. 180 tablet 1    lancets Misc To check BG 2 times daily, to use with insurance preferred meter 100 each 0    metFORMIN (GLUCOPHAGE) 1000 MG tablet Take 1 tablet (1,000 mg total) by mouth 2 (two) times daily with meals. Please hold medication for 2 more days. Can restart on 12/3/21 (Patient taking differently: Take 1,000 mg by mouth 2 (two) times daily with meals.) 180 tablet 1     MULTIVITAMIN W-MINERALS/LUTEIN (CENTRUM SILVER ORAL) Take 1 capsule by mouth once daily.      SITagliptin (JANUVIA) 100 MG Tab Take 1 tablet (100 mg total) by mouth once daily. 30 tablet 11    ticagrelor (BRILINTA) 90 mg tablet Take 1 tablet (90 mg total) by mouth 2 (two) times a day. 60 tablet 1    [DISCONTINUED] clopidogreL (PLAVIX) 75 mg tablet Take 1 tablet (75 mg total) by mouth once daily. 90 tablet 1       SCHEDULED MEDS:   [START ON 6/16/2022] aspirin  81 mg Oral Daily    atorvastatin  80 mg Oral QHS    carvediloL  12.5 mg Oral BID WM    hydrALAZINE  25 mg Oral Q8H    isosorbide dinitrate  40 mg Oral TID    [START ON 6/16/2022] pantoprazole  40 mg Intravenous Daily    ticagrelor  90 mg Oral BID       CONTINUOUS INFUSIONS:   heparin (porcine) in D5W         PRN MEDS:acetaminophen, dextrose 10%, dextrose 10%, heparin (PORCINE), heparin (PORCINE), insulin aspart U-100, labetalol, magnesium oxide, magnesium oxide, melatonin, naloxone, ondansetron, polyethylene glycol, potassium bicarbonate, potassium bicarbonate, potassium bicarbonate, potassium, sodium phosphates, potassium, sodium phosphates, potassium, sodium phosphates    LABS AND DIAGNOSTICS     CBC LAST 3 DAYS  Recent Labs   Lab 06/08/22  1746 06/09/22  0814 06/15/22  1007   WBC 8.63 7.44  7.44 8.69   RBC 3.91* 4.03*  4.03* 4.06*   HGB 11.5* 11.8*  11.8* 12.3*   HCT 34.8* 35.6*  35.6* 36.7*   MCV 89 88  88 90   MCH 29.4 29.3  29.3 30.3   MCHC 33.0 33.1  33.1 33.5   RDW 15.2* 15.0*  15.0* 14.5    172  172 188   MPV 9.9 10.5  10.5 10.4   GRAN 78.0*  6.7 71.3  71.3  5.3  5.3 75.8*  6.6   LYMPH 12.2*  1.1 15.9*  15.9*  1.2  1.2 13.1*  1.1   MONO 6.6  0.6 8.2  8.2  0.6  0.6 7.0  0.6   BASO 0.06 0.07  0.07 0.08   NRBC 0 0  0 0       COAGULATION LAST 3 DAYS  Recent Labs   Lab 06/08/22  1746 06/09/22  0006 06/09/22  0814 06/09/22  1331 06/15/22  1424   LABPT 15.6*  --   --   --  15.2*   INR 1.3  --   --   --  1.3   APTT  26.1   < > 100.4* 41.3* 25.0    < > = values in this interval not displayed.       CHEMISTRY LAST 3 DAYS  Recent Labs   Lab 06/09/22  0813 06/15/22  1007    135*   K 4.7 4.5    102   CO2 22* 20*   ANIONGAP 10 13   BUN 39* 43*   CREATININE 2.1* 2.2*   * 303*   CALCIUM 9.1 9.1   MG 1.7 1.7   ALBUMIN  --  4.5   PROT  --  7.6   ALKPHOS  --  84   ALT  --  42   AST  --  39   BILITOT  --  0.9       CARDIAC PROFILE LAST 3 DAYS  Recent Labs   Lab 06/09/22  0814 06/15/22  1007   BNP  --  228*   TROPONINI 2.829* 0.074*       ENDOCRINE LAST 3 DAYS  No results for input(s): TSH, PROCAL in the last 168 hours.    LAST ARTERIAL BLOOD GAS  ABG  No results for input(s): PH, PO2, PCO2, HCO3, BE in the last 168 hours.    LAST 7 DAYS MICROBIOLOGY   Microbiology Results (last 7 days)     ** No results found for the last 168 hours. **          MOST RECENT IMAGING  X-Ray Chest AP Portable  CLINICAL HISTORY:  71 years (1950) Male Chest Pain Chest Pain (Pt reports chestpain with jaw pain that started about 0830 this morning, hx of 5 stents, ablation scheduled for 6/24)    TECHNIQUE:  Portable AP radiograph the chest.    COMPARISON:  Most recent radiograph from June 9, 2022    FINDINGS:  There are faint linear opacities at the lung bases suggestive of atelectasis/scarring, aspiration/pneumonia or trace edema in the appropriate clinical setting. Costophrenic angles are seen without effusion. No pneumothorax is identified. The heart is mildly enlarged. Atheromatous calcifications are seen at the aortic arch. Osseous structures appear unchanged. The visualized upper abdomen is unremarkable.    IMPRESSION:  Borderline cardiomegaly and findings of mild interstitial pulmonary edema at the lung bases.    .    Electronically signed by:  Wally Loving MD  6/15/2022 10:32 AM CDT Workstation: [id number]      ECHOCARDIOGRAM RESULTS (last 5)  Results for orders placed during the hospital encounter of  06/07/22    Echo    Interpretation Summary  · The left ventricle is normal in size with concentric remodeling and mildly decreased systolic function.  · The estimated ejection fraction is 40%.  · Left ventricular diastolic dysfunction.  · There is abnormal septal wall motion consistent with left bundle branch block.  · Normal right ventricular size with normal right ventricular systolic function.  · Mild left atrial enlargement.  · Mild tricuspid regurgitation.  · Normal central venous pressure (3 mmHg).      Results for orders placed during the hospital encounter of 04/18/22    Echo    Interpretation Summary  · The left ventricle is normal in size with low normal systolic function.  · The estimated ejection fraction is 50%.  · Normal left ventricular diastolic function.  · Normal right ventricular size with normal right ventricular systolic function.      Results for orders placed during the hospital encounter of 11/26/21    Echo    Interpretation Summary  · The left ventricle is normal in size with severe concentric hypertrophy and mildly decreased systolic function.  · The estimated ejection fraction is 45%. There appears to be inferolateral hypokinesis.  · Indeterminate left ventricular diastolic function.  · Normal right ventricular size with normal right ventricular systolic function.  · Normal central venous pressure (3 mmHg).      CURRENT/PREVIOUS VISIT EKG  Results for orders placed or performed during the hospital encounter of 06/15/22   EKG 12-lead    Collection Time: 06/15/22  9:56 AM    Narrative    Test Reason : R07.9,    Vent. Rate : 093 BPM     Atrial Rate : 093 BPM     P-R Int : 160 ms          QRS Dur : 152 ms      QT Int : 398 ms       P-R-T Axes : 068 019 189 degrees     QTc Int : 494 ms    Normal sinus rhythm  Left bundle branch block  Abnormal ECG  When compared with ECG of 07-JUN-2022 22:04,  No significant change was found    Referred By: [email or web address]   SELF           Confirmed By:             ASSESSMENT/PLAN:     Active Hospital Problems    Diagnosis    *NSTEMI (non-ST elevated myocardial infarction)    Acute renal failure superimposed on stage 3a chronic kidney disease    Hypertensive emergency    Type 2 diabetes mellitus with stage 3b chronic kidney disease, without long-term current use of insulin    Coronary artery disease       ASSESSMENT & PLAN:   # NSTEMI   # CAD s/p angioplastic RCA/PDA   # EMILEE on CKD   # HTN emergency   # Chronic systolic heart failure       RECOMMENDATIONS:  Continue to trend troponin   He has been started on heparin gtt   Remain NPO after midnight in case of need for angiographic assessment in the AM   Stabilize BP - increase hydralazine as needed   Can place nitro paste if chest pain recurs   Avoid further admin of morphine       Yolette Rothman PA-C  [facility]  Department of Cardiology  Date of Service: 06/15/2022

## 2022-06-15 NOTE — SUBJECTIVE & OBJECTIVE
Past Medical History:   Diagnosis Date    Coronary artery disease     Diabetes mellitus type II     Hypertension        Past Surgical History:   Procedure Laterality Date    ANGIOGRAM, CORONARY, WITH LEFT HEART CATHETERIZATION N/A 11/27/2021    Procedure: Angiogram, Coronary, with Left Heart Cath;  Surgeon: Dave Vernon MD;  Location: [facility] CATH/EP LAB;  Service: Cardiology;  Laterality: N/A;    ANGIOGRAM, CORONARY, WITH LEFT HEART CATHETERIZATION N/A 4/18/2022    Procedure: Angiogram, Coronary, with Left Heart Cath;  Surgeon: Dave Vernon MD;  Location: [facility] CATH/EP LAB;  Service: Cardiology;  Laterality: N/A;    APPENDECTOMY      CORONARY ANGIOGRAPHY Left 11/30/2021    Procedure: ANGIOGRAM, CORONARY ARTERY;  Surgeon: Gunnar Vernon MD;  Location: [facility] CATH LAB;  Service: Cardiology;  Laterality: Left;    HERNIA REPAIR      LEFT HEART CATHETERIZATION Left 4/20/2022    Procedure: Left heart cath;  Surgeon: Dave Vernon MD;  Location: [facility] CATH/EP LAB;  Service: Cardiology;  Laterality: Left;    PERCUTANEOUS TRANSLUMINAL ANGIOPLASTY N/A 4/20/2022    Procedure: PTA (ANGIOPLASTY, PERCUTANEOUS, TRANSLUMINAL);  Surgeon: Dave Vernon MD;  Location: [facility] CATH/EP LAB;  Service: Cardiology;  Laterality: N/A;    stents         Review of patient's allergies indicates:   Allergen Reactions    Morphine Nausea And Vomiting    Vicodin [hydrocodone-acetaminophen] Nausea And Vomiting       No current facility-administered medications on file prior to encounter.     Current Outpatient Medications on File Prior to Encounter   Medication Sig    alcohol swabs (BD ALCOHOL SWABS) PadM Apply 1 each topically as needed.    aspirin (ECOTRIN) 81 MG EC tablet Take 1 tablet (81 mg total) by mouth once daily.    atorvastatin (LIPITOR) 80 MG tablet Take 1 tablet (80 mg total) by mouth every evening.    blood sugar diagnostic (ACCU-CHEK OLGA PLUS TEST STRP) Strp 1 each by Misc.(Non-Drug; Combo Route) route daily as needed.    blood  sugar diagnostic Strp To check BG 2 times daily, to use with insurance preferred meter    carvediloL (COREG) 12.5 MG tablet Take 1 tablet (12.5 mg total) by mouth 2 (two) times daily with meals.    furosemide (LASIX) 20 MG tablet Take 1 tablet (20 mg total) by mouth daily as needed (Shortness of breath, leg swelling or weight gain of greater than 3 lb overnight or 5 lb within a week).    glipiZIDE (GLUCOTROL) 10 MG tablet Take 1 tablet (10 mg total) by mouth 2 (two) times daily with meals.    hydrALAZINE (APRESOLINE) 25 MG tablet Take 1 tablet (25 mg total) by mouth every 8 (eight) hours.    isosorbide dinitrate (ISORDIL) 20 MG tablet Take 2 tablets (40 mg total) by mouth 3 (three) times daily.    lancets Misc To check BG 2 times daily, to use with insurance preferred meter    metFORMIN (GLUCOPHAGE) 1000 MG tablet Take 1 tablet (1,000 mg total) by mouth 2 (two) times daily with meals. Please hold medication for 2 more days. Can restart on 12/3/21 (Patient taking differently: Take 1,000 mg by mouth 2 (two) times daily with meals.)    MULTIVITAMIN W-MINERALS/LUTEIN (CENTRUM SILVER ORAL) Take 1 capsule by mouth once daily.    SITagliptin (JANUVIA) 100 MG Tab Take 1 tablet (100 mg total) by mouth once daily.    ticagrelor (BRILINTA) 90 mg tablet Take 1 tablet (90 mg total) by mouth 2 (two) times a day.    [DISCONTINUED] clopidogreL (PLAVIX) 75 mg tablet Take 1 tablet (75 mg total) by mouth once daily.     Family History       Problem Relation (Age of Onset)    Diabetes Mother          Tobacco Use    Smoking status: Never Smoker    Smokeless tobacco: Never Used   Substance and Sexual Activity    Alcohol use: Not Currently     Comment: 2 a year    Drug use: No    Sexual activity: Not Currently     Partners: Female       Objective:     Vital Signs (Most Recent):  Temp: 98.2 °F (36.8 °C) (06/15/22 1004)  Pulse: 71 (06/15/22 1334)  Resp: 18 (06/15/22 1304)  BP: (!) 182/89 (06/15/22 1334)  SpO2: 96 % (06/15/22 1334)   Vital  Signs (24h Range):  Temp:  [98.2 °F (36.8 °C)] 98.2 °F (36.8 °C)  Pulse:  [66-91] 71  Resp:  [16-19] 18  SpO2:  [93 %-96 %] 96 %  BP: (182-207)/(86-96) 182/89     Weight: 89.8 kg (198 lb)  Body mass index is 29.24 kg/m².    Physical Exam      General: Patient resting comfortably in no acute distress. Appears stated age  Head: Normocephalic and atraumatic   Eyes:  No conjunctival pallor. No scleral icterus.  Mouth: Oral mucosa moist   Lungs: CTA. Good air entry.  Cor: Regular rate and rhythm. No murmurs. No pedal edema.  Abd: Soft. Nontender  Musculoskeletal: No arthropathy, deformity.  Skin: No rashes, swelling, or erythema.  Neuro: A&O x 3. Moving all extremities equally. Follows commands  Ext: No clubbing. No cyanosis. Peripheral pulses +  Psych: Normal mood and affect. Memory intact       Significant Labs: All pertinent labs within the past 24 hours have been reviewed.  CBC:   Recent Labs   Lab 06/15/22  1007   WBC 8.69   HGB 12.3*   HCT 36.7*        CMP:   Recent Labs   Lab 06/15/22  1007   *   K 4.5      CO2 20*   *   BUN 43*   CREATININE 2.2*   CALCIUM 9.1   PROT 7.6   ALBUMIN 4.5   BILITOT 0.9   ALKPHOS 84   AST 39   ALT 42   ANIONGAP 13   EGFRNONAA 28.7*     Cardiac Markers:   Recent Labs   Lab 06/15/22  1007   *     Troponin:   Recent Labs   Lab 06/15/22  1007   TROPONINI 0.074*       Significant Imaging: I have reviewed all pertinent imaging results/findings within the past 24 hours.    Imaging Results              X-Ray Chest AP Portable (Final result)  Result time 06/15/22 10:32:25      Final result by Wally Loving MD (06/15/22 10:32:25)                   Narrative:    CLINICAL HISTORY:  71 years (1950) Male Chest Pain Chest Pain (Pt reports chestpain with jaw pain that started about 0830 this morning, hx of 5 stents, ablation scheduled for 6/24)    TECHNIQUE:  Portable AP radiograph the chest.    COMPARISON:  Most recent radiograph from June 9,  2022    FINDINGS:  There are faint linear opacities at the lung bases suggestive of atelectasis/scarring, aspiration/pneumonia or trace edema in the appropriate clinical setting. Costophrenic angles are seen without effusion. No pneumothorax is identified. The heart is mildly enlarged. Atheromatous calcifications are seen at the aortic arch. Osseous structures appear unchanged. The visualized upper abdomen is unremarkable.    IMPRESSION:  Borderline cardiomegaly and findings of mild interstitial pulmonary edema at the lung bases.                  .            Electronically signed by:  Wally Loving MD  6/15/2022 10:32 AM CDT Workstation: [id number]                                    Results for orders placed or performed during the hospital encounter of 06/15/22   EKG 12-lead    Collection Time: 06/15/22  9:56 AM    Narrative    Test Reason : R07.9,    Vent. Rate : 093 BPM     Atrial Rate : 093 BPM     P-R Int : 160 ms          QRS Dur : 152 ms      QT Int : 398 ms       P-R-T Axes : 068 019 189 degrees     QTc Int : 494 ms    Normal sinus rhythm  Left bundle branch block  Abnormal ECG  When compared with ECG of 07-JUN-2022 22:04,  No significant change was found    Referred By: [email or web address]   SELF           Confirmed By:

## 2022-06-15 NOTE — H&P
[facility]  History & Physical    Patient Name: Amadeo Levin  MRN: [id number]  Patient Class: IP- Inpatient  Admission Date: 6/15/2022  Attending Physician: Rudy Jaffe MD  Primary Care Provider: Angelito Adams MD         Patient information was obtained from patient, past medical records and ER records.     Subjective:     Principal Problem:NSTEMI (non-ST elevated myocardial infarction)    Chief Complaint:   Chief Complaint   Patient presents with    Chest Pain     Pt reports chestpain with jaw pain that started about 0830 this morning, hx of 5 stents, ablation scheduled for 6/24        HPI: Patient is a 71-year-old  male with known CAD, recent angioplasty in April 2022, CKD stage 3 and type 2 diabetes mellitus presents to the ED with chief complaint of chest pain.    He was hospitalized here last week for decompensated systolic congestive heart failure and NSTEMI. He was treated medically with heparin drip.  He was initiated on p.r.n. furosemide with improvement in his volume overload.    He presents today with acute episode of substernal chest discomfort which started after gardening.  It is burning in nature with radiation to his midline neck.  No associated diaphoresis or shortness of breath.  Reports compliance with his medications.  Symptoms are moderate in nature.  No exacerbating or relieving factors. He is scheduled to see Dr. Vernon at [facility] for brachytherapy of his CAD later this month.    Rest of the 10 point review of systems is negative except as mentioned above.      Past Medical History:   Diagnosis Date    Coronary artery disease     Diabetes mellitus type II     Hypertension        Past Surgical History:   Procedure Laterality Date    ANGIOGRAM, CORONARY, WITH LEFT HEART CATHETERIZATION N/A 11/27/2021    Procedure: Angiogram, Coronary, with Left Heart Cath;  Surgeon: Dave Vernon MD;  Location: [facility] CATH/EP LAB;  Service: Cardiology;   Laterality: N/A;    ANGIOGRAM, CORONARY, WITH LEFT HEART CATHETERIZATION N/A 4/18/2022    Procedure: Angiogram, Coronary, with Left Heart Cath;  Surgeon: Dave Vernon MD;  Location: [facility] CATH/EP LAB;  Service: Cardiology;  Laterality: N/A;    APPENDECTOMY      CORONARY ANGIOGRAPHY Left 11/30/2021    Procedure: ANGIOGRAM, CORONARY ARTERY;  Surgeon: Gunnar Vernon MD;  Location: [facility] CATH LAB;  Service: Cardiology;  Laterality: Left;    HERNIA REPAIR      LEFT HEART CATHETERIZATION Left 4/20/2022    Procedure: Left heart cath;  Surgeon: Dave Vernon MD;  Location: [facility] CATH/EP LAB;  Service: Cardiology;  Laterality: Left;    PERCUTANEOUS TRANSLUMINAL ANGIOPLASTY N/A 4/20/2022    Procedure: PTA (ANGIOPLASTY, PERCUTANEOUS, TRANSLUMINAL);  Surgeon: Dave Vernon MD;  Location: [facility] CATH/EP LAB;  Service: Cardiology;  Laterality: N/A;    stents         Review of patient's allergies indicates:   Allergen Reactions    Morphine Nausea And Vomiting    Vicodin [hydrocodone-acetaminophen] Nausea And Vomiting       No current facility-administered medications on file prior to encounter.     Current Outpatient Medications on File Prior to Encounter   Medication Sig    alcohol swabs (BD ALCOHOL SWABS) PadM Apply 1 each topically as needed.    aspirin (ECOTRIN) 81 MG EC tablet Take 1 tablet (81 mg total) by mouth once daily.    atorvastatin (LIPITOR) 80 MG tablet Take 1 tablet (80 mg total) by mouth every evening.    blood sugar diagnostic (ACCU-CHEK OLGA PLUS TEST STRP) Strp 1 each by Misc.(Non-Drug; Combo Route) route daily as needed.    blood sugar diagnostic Strp To check BG 2 times daily, to use with insurance preferred meter    carvediloL (COREG) 12.5 MG tablet Take 1 tablet (12.5 mg total) by mouth 2 (two) times daily with meals.    furosemide (LASIX) 20 MG tablet Take 1 tablet (20 mg total) by mouth daily as needed (Shortness of breath, leg swelling or weight gain of greater than 3 lb overnight  or 5 lb within a week).    glipiZIDE (GLUCOTROL) 10 MG tablet Take 1 tablet (10 mg total) by mouth 2 (two) times daily with meals.    hydrALAZINE (APRESOLINE) 25 MG tablet Take 1 tablet (25 mg total) by mouth every 8 (eight) hours.    isosorbide dinitrate (ISORDIL) 20 MG tablet Take 2 tablets (40 mg total) by mouth 3 (three) times daily.    lancets Misc To check BG 2 times daily, to use with insurance preferred meter    metFORMIN (GLUCOPHAGE) 1000 MG tablet Take 1 tablet (1,000 mg total) by mouth 2 (two) times daily with meals. Please hold medication for 2 more days. Can restart on 12/3/21 (Patient taking differently: Take 1,000 mg by mouth 2 (two) times daily with meals.)    MULTIVITAMIN W-MINERALS/LUTEIN (CENTRUM SILVER ORAL) Take 1 capsule by mouth once daily.    SITagliptin (JANUVIA) 100 MG Tab Take 1 tablet (100 mg total) by mouth once daily.    ticagrelor (BRILINTA) 90 mg tablet Take 1 tablet (90 mg total) by mouth 2 (two) times a day.    [DISCONTINUED] clopidogreL (PLAVIX) 75 mg tablet Take 1 tablet (75 mg total) by mouth once daily.     Family History       Problem Relation (Age of Onset)    Diabetes Mother          Tobacco Use    Smoking status: Never Smoker    Smokeless tobacco: Never Used   Substance and Sexual Activity    Alcohol use: Not Currently     Comment: 2 a year    Drug use: No    Sexual activity: Not Currently     Partners: Female       Objective:     Vital Signs (Most Recent):  Temp: 98.2 °F (36.8 °C) (06/15/22 1004)  Pulse: 71 (06/15/22 1334)  Resp: 18 (06/15/22 1304)  BP: (!) 182/89 (06/15/22 1334)  SpO2: 96 % (06/15/22 1334)   Vital Signs (24h Range):  Temp:  [98.2 °F (36.8 °C)] 98.2 °F (36.8 °C)  Pulse:  [66-91] 71  Resp:  [16-19] 18  SpO2:  [93 %-96 %] 96 %  BP: (182-207)/(86-96) 182/89     Weight: 89.8 kg (198 lb)  Body mass index is 29.24 kg/m².    Physical Exam      General: Patient resting comfortably in no acute distress. Appears stated age  Head: Normocephalic and  atraumatic   Eyes:  No conjunctival pallor. No scleral icterus.  Mouth: Oral mucosa moist   Lungs: CTA. Good air entry.  Cor: Regular rate and rhythm. No murmurs. No pedal edema.  Abd: Soft. Nontender  Musculoskeletal: No arthropathy, deformity.  Skin: No rashes, swelling, or erythema.  Neuro: A&O x 3. Moving all extremities equally. Follows commands  Ext: No clubbing. No cyanosis. Peripheral pulses +  Psych: Normal mood and affect. Memory intact       Significant Labs: All pertinent labs within the past 24 hours have been reviewed.  CBC:   Recent Labs   Lab 06/15/22  1007   WBC 8.69   HGB 12.3*   HCT 36.7*        CMP:   Recent Labs   Lab 06/15/22  1007   *   K 4.5      CO2 20*   *   BUN 43*   CREATININE 2.2*   CALCIUM 9.1   PROT 7.6   ALBUMIN 4.5   BILITOT 0.9   ALKPHOS 84   AST 39   ALT 42   ANIONGAP 13   EGFRNONAA 28.7*     Cardiac Markers:   Recent Labs   Lab 06/15/22  1007   *     Troponin:   Recent Labs   Lab 06/15/22  1007   TROPONINI 0.074*       Significant Imaging: I have reviewed all pertinent imaging results/findings within the past 24 hours.    Imaging Results              X-Ray Chest AP Portable (Final result)  Result time 06/15/22 10:32:25      Final result by Wally Loving MD (06/15/22 10:32:25)                   Narrative:    CLINICAL HISTORY:  71 years (1950) Male Chest Pain Chest Pain (Pt reports chestpain with jaw pain that started about 0830 this morning, hx of 5 stents, ablation scheduled for 6/24)    TECHNIQUE:  Portable AP radiograph the chest.    COMPARISON:  Most recent radiograph from June 9, 2022    FINDINGS:  There are faint linear opacities at the lung bases suggestive of atelectasis/scarring, aspiration/pneumonia or trace edema in the appropriate clinical setting. Costophrenic angles are seen without effusion. No pneumothorax is identified. The heart is mildly enlarged. Atheromatous calcifications are seen at the aortic arch. Osseous  structures appear unchanged. The visualized upper abdomen is unremarkable.    IMPRESSION:  Borderline cardiomegaly and findings of mild interstitial pulmonary edema at the lung bases.                  .            Electronically signed by:  Wally Loving MD  6/15/2022 10:32 AM CDT Workstation: [id number]                                    Results for orders placed or performed during the hospital encounter of 06/15/22   EKG 12-lead    Collection Time: 06/15/22  9:56 AM    Narrative    Test Reason : R07.9,    Vent. Rate : 093 BPM     Atrial Rate : 093 BPM     P-R Int : 160 ms          QRS Dur : 152 ms      QT Int : 398 ms       P-R-T Axes : 068 019 189 degrees     QTc Int : 494 ms    Normal sinus rhythm  Left bundle branch block  Abnormal ECG  When compared with ECG of 07-JUN-2022 22:04,  No significant change was found    Referred By: LAUREANO   SELF           Confirmed By:          Assessment/Plan:     Active Hospital Problems    Diagnosis  POA    *NSTEMI (non-ST elevated myocardial infarction) [I21.4]  Yes    Acute renal failure superimposed on stage 3a chronic kidney disease [N17.9, N18.31]  Yes    Hypertensive emergency [I16.1]  Yes    Type 2 diabetes mellitus with stage 3b chronic kidney disease, without long-term current use of insulin [E11.22, N18.32]  Yes    Coronary artery disease [I25.10]  Yes      Resolved Hospital Problems   No resolved problems to display.       Plan:  Atypical chest pain  Patient has known ASCVD pending intervention  Symptoms could be secondary to NSTEMI versus hypertensive emergency  Discussed case with cardiologist Dr. Vernon  Recommend heparin drip  Likely plans for intervention noted  Continue aspirin and ticagrelor in the setting of recent stenting  Continue home statin  Continue carvedilol and nitrates  IV labetalol p.r.n. with parameters for hypertensive emergency  Moderate dose insulin sliding scale.  Hold home hypoglycemic agents    EMILEE on CKD stage 3a. Monitor with  daily labs. Avoiding ACEi and ARBs         VTE Risk Mitigation (From admission, onward)         Ordered     heparin 25,000 units in dextrose 5% (100 units/ml) IV bolus from bag - ADDITIONAL PRN BOLUS - 60 units/kg (max bolus 4000 units)  As needed (PRN)        Question:  Heparin Infusion Adjustment (DO NOT MODIFY ANSWER)  Answer:  [email or web address]\epic\Images\Pharmacy\HeparinInfusions\heparin LOW INTENSITY nomogram for [facility] [id number].pdf    06/15/22 1302     heparin 25,000 units in dextrose 5% (100 units/ml) IV bolus from bag - ADDITIONAL PRN BOLUS - 30 units/kg (max bolus 4000 units)  As needed (PRN)        Question:  Heparin Infusion Adjustment (DO NOT MODIFY ANSWER)  Answer:  [email or web address]\epic\Images\Pharmacy\HeparinInfusions\heparin LOW INTENSITY nomogram for [facility] [id number].pdf    06/15/22 1302     heparin 25,000 units in dextrose 5% (100 units/ml) IV bolus from bag INITIAL BOLUS (max bolus 4000 units)  Once        Question:  Heparin Infusion Adjustment (DO NOT MODIFY ANSWER)  Answer:  [email or web address]\epic\Images\Pharmacy\HeparinInfusions\heparin LOW INTENSITY nomogram for [facility] [id number]    06/15/22 1302     heparin 25,000 units in dextrose 5% 250 mL (100 units/mL) infusion LOW INTENSITY nomogram - [facility]  Continuous        Question Answer Comment   Heparin Infusion Adjustment (DO NOT MODIFY ANSWER) [email or web address]\epic\Images\Pharmacy\HeparinInfusions\heparin LOW INTENSITY nomogram for [facility] [id number]    Begin at (in units/kg/hr) 12        06/15/22 1302                   Rudy Jaffe MD  Department of Hospital Medicine   [facility]

## 2022-06-15 NOTE — HPI
Patient is a 71-year-old  male with known CAD, recent angioplasty in April 2022, CKD stage 3 and type 2 diabetes mellitus presents to the ED with chief complaint of chest pain.    He was hospitalized here last week for decompensated systolic congestive heart failure and NSTEMI. He was treated medically with heparin drip.  He was initiated on p.r.n. furosemide with improvement in his volume overload.    He presents today with acute episode of substernal chest discomfort which started after gardening.  It is burning in nature with radiation to his midline neck.  No associated diaphoresis or shortness of breath.  Reports compliance with his medications.  Symptoms are moderate in nature.  No exacerbating or relieving factors. He is scheduled to see Dr. Vernon at [facility] for brachytherapy of his CAD later this month.    Rest of the 10 point review of systems is negative except as mentioned above.

## 2022-06-15 NOTE — ED PROVIDER NOTES
Encounter Date: 6/15/2022       History     Chief Complaint   Patient presents with    Chest Pain     Pt reports chestpain with jaw pain that started about 0830 this morning, hx of 5 stents, ablation scheduled for 6/24     Patient here with reported onset of chest pain with associated shortness of breath this morning pain radiating to his jaw described as a burning pain he does have a history of coronary disease diabetes and hypertension states most recent cardiac catheterization resulted in a stent he did have another blockage which have been ballooned in the past but will rate likely require additional angioplasty his his last admit was for heart failure his blood pressures have been running well controlled since his last discharge however this morning blood pressure is 200 / 96 in the emergency department patient did take his morning medications as scheduled        Review of patient's allergies indicates:   Allergen Reactions    Morphine Nausea And Vomiting    Vicodin [hydrocodone-acetaminophen] Nausea And Vomiting     Past Medical History:   Diagnosis Date    Coronary artery disease     Diabetes mellitus type II     Hypertension      Past Surgical History:   Procedure Laterality Date    ANGIOGRAM, CORONARY, WITH LEFT HEART CATHETERIZATION N/A 11/27/2021    Procedure: Angiogram, Coronary, with Left Heart Cath;  Surgeon: Dave Vernon MD;  Location: [facility] CATH/EP LAB;  Service: Cardiology;  Laterality: N/A;    ANGIOGRAM, CORONARY, WITH LEFT HEART CATHETERIZATION N/A 4/18/2022    Procedure: Angiogram, Coronary, with Left Heart Cath;  Surgeon: Dave Vernon MD;  Location: [facility] CATH/EP LAB;  Service: Cardiology;  Laterality: N/A;    APPENDECTOMY      CORONARY ANGIOGRAPHY Left 11/30/2021    Procedure: ANGIOGRAM, CORONARY ARTERY;  Surgeon: Gunnar Vernon MD;  Location: [facility] CATH LAB;  Service: Cardiology;  Laterality: Left;    HERNIA REPAIR      LEFT HEART CATHETERIZATION Left 4/20/2022    Procedure:  Left heart cath;  Surgeon: Dave Vernon MD;  Location: [facility] CATH/EP LAB;  Service: Cardiology;  Laterality: Left;    PERCUTANEOUS TRANSLUMINAL ANGIOPLASTY N/A 4/20/2022    Procedure: PTA (ANGIOPLASTY, PERCUTANEOUS, TRANSLUMINAL);  Surgeon: Dave Vernon MD;  Location: [facility] CATH/EP LAB;  Service: Cardiology;  Laterality: N/A;    stents       Family History   Problem Relation Age of Onset    Diabetes Mother     Cancer Neg Hx     Macular degeneration Neg Hx     Retinal detachment Neg Hx     Glaucoma Neg Hx      Social History     Tobacco Use    Smoking status: Never Smoker    Smokeless tobacco: Never Used   Substance Use Topics    Alcohol use: Not Currently     Comment: 2 a year    Drug use: No     Review of Systems   Constitutional: Negative for appetite change, chills and fever.   HENT: Negative for congestion.    Respiratory: Positive for shortness of breath. Negative for cough.    Cardiovascular: Positive for chest pain. Negative for palpitations and leg swelling.   Gastrointestinal: Negative for abdominal pain, nausea and vomiting.   Genitourinary: Negative for dysuria.       Physical Exam     Initial Vitals   BP Pulse Resp Temp SpO2   06/15/22 1004 06/15/22 1004 06/15/22 1004 06/15/22 1004 06/15/22 1034   (!) 200/95 91 16 98.2 °F (36.8 °C) 96 %      MAP       --                Physical Exam    Constitutional: He appears well-developed and well-nourished. No distress.   HENT:   Head: Normocephalic and atraumatic.   Right Ear: External ear normal.   Left Ear: External ear normal.   Mouth/Throat: Oropharynx is clear and moist.   Eyes: Pupils are equal, round, and reactive to light.   Neck: Neck supple.   Normal range of motion.  Cardiovascular: Normal rate, regular rhythm, S1 normal, S2 normal, normal heart sounds and intact distal pulses.   Pulmonary/Chest: Breath sounds normal. No respiratory distress.   Abdominal: Abdomen is soft. Bowel sounds are normal. There is no abdominal tenderness.    Musculoskeletal:         General: Normal range of motion.      Cervical back: Normal range of motion and neck supple.      Comments: Bipedal edema     Neurological: He is alert and oriented to person, place, and time. GCS score is 15. GCS eye subscore is 4. GCS verbal subscore is 5. GCS motor subscore is 6.   Skin: Skin is warm and dry. No rash noted.   Psychiatric: He has a normal mood and affect. His behavior is normal.         ED Course   Procedures  Labs Reviewed   CBC W/ AUTO DIFFERENTIAL - Abnormal; Notable for the following components:       Result Value    RBC 4.06 (*)     Hemoglobin 12.3 (*)     Hematocrit 36.7 (*)     Gran % 75.8 (*)     Lymph % 13.1 (*)     All other components within normal limits   COMPREHENSIVE METABOLIC PANEL - Abnormal; Notable for the following components:    Sodium 135 (*)     CO2 20 (*)     Glucose 303 (*)     BUN 43 (*)     Creatinine 2.2 (*)     eGFR if  33.1 (*)     eGFR if non  28.7 (*)     All other components within normal limits   TROPONIN I - Abnormal; Notable for the following components:    Troponin I 0.074 (*)     All other components within normal limits   B-TYPE NATRIURETIC PEPTIDE - Abnormal; Notable for the following components:     (*)     All other components within normal limits   SARS-COV-2 RNA AMPLIFICATION, QUAL   MAGNESIUM   POCT GLUCOSE MONITORING CONTINUOUS        ECG Results          EKG 12-lead (In process)  Result time 06/15/22 10:16:30    In process by Interface, Lab In [facility] (06/15/22 10:16:30)                 Narrative:    Test Reason : R07.9,    Vent. Rate : 093 BPM     Atrial Rate : 093 BPM     P-R Int : 160 ms          QRS Dur : 152 ms      QT Int : 398 ms       P-R-T Axes : 068 019 189 degrees     QTc Int : 494 ms    Normal sinus rhythm  Left bundle branch block  Abnormal ECG  When compared with ECG of 07-JUN-2022 22:04,  No significant change was found    Referred By: [email or web address]   SELF           Confirmed  By:                             Imaging Results          X-Ray Chest AP Portable (Final result)  Result time 06/15/22 10:32:25    Final result by Wally Loving MD (06/15/22 10:32:25)                 Narrative:    CLINICAL HISTORY:  71 years (1950) Male Chest Pain Chest Pain (Pt reports chestpain with jaw pain that started about 0830 this morning, hx of 5 stents, ablation scheduled for 6/24)    TECHNIQUE:  Portable AP radiograph the chest.    COMPARISON:  Most recent radiograph from June 9, 2022    FINDINGS:  There are faint linear opacities at the lung bases suggestive of atelectasis/scarring, aspiration/pneumonia or trace edema in the appropriate clinical setting. Costophrenic angles are seen without effusion. No pneumothorax is identified. The heart is mildly enlarged. Atheromatous calcifications are seen at the aortic arch. Osseous structures appear unchanged. The visualized upper abdomen is unremarkable.    IMPRESSION:  Borderline cardiomegaly and findings of mild interstitial pulmonary edema at the lung bases.                  .            Electronically signed by:  Wally Loving MD  6/15/2022 10:32 AM CDT Workstation: [id number]                               Medications   labetaloL injection 10 mg (has no administration in time range)   aspirin EC tablet 81 mg (81 mg Oral Given 6/16/22 0837)   atorvastatin tablet 80 mg (80 mg Oral Given 6/15/22 2100)   carvediloL tablet 12.5 mg (12.5 mg Oral Given 6/16/22 1631)   hydrALAZINE tablet 25 mg (25 mg Oral Given 6/16/22 1446)   isosorbide dinitrate tablet 40 mg (40 mg Oral Given 6/16/22 1446)   pantoprazole injection 40 mg (40 mg Intravenous Given 6/16/22 0556)   ticagrelor tablet 90 mg (90 mg Oral Given 6/16/22 0838)   melatonin tablet 6 mg (has no administration in time range)   ondansetron injection 4 mg (has no administration in time range)   polyethylene glycol packet 17 g (has no administration in time range)   acetaminophen tablet 650 mg (has no  administration in time range)   naloxone 0.4 mg/mL injection 0.02 mg (has no administration in time range)   potassium bicarbonate disintegrating tablet 50 mEq (has no administration in time range)   potassium bicarbonate disintegrating tablet 35 mEq (has no administration in time range)   potassium bicarbonate disintegrating tablet 60 mEq (has no administration in time range)   magnesium oxide tablet 800 mg (has no administration in time range)   magnesium oxide tablet 800 mg (has no administration in time range)   potassium, sodium phosphates 280-160-250 mg packet 2 packet (has no administration in time range)   potassium, sodium phosphates 280-160-250 mg packet 2 packet (has no administration in time range)   potassium, sodium phosphates 280-160-250 mg packet 2 packet (has no administration in time range)   heparin 25,000 units in dextrose 5% 250 mL (100 units/mL) infusion LOW INTENSITY nomogram - [facility] (15 Units/kg/hr × 78.3 kg (Adjusted) Intravenous Rate/Dose Change 6/16/22 1328)   heparin 25,000 units in dextrose 5% (100 units/ml) IV bolus from bag - ADDITIONAL PRN BOLUS - 60 units/kg (max bolus 4000 units) (4,000 Units Intravenous Bolus from Bag 6/16/22 1329)   heparin 25,000 units in dextrose 5% (100 units/ml) IV bolus from bag - ADDITIONAL PRN BOLUS - 30 units/kg (max bolus 4000 units) (has no administration in time range)   insulin aspart U-100 pen 1-12 Units (2 Units Subcutaneous Given 6/16/22 1700)   dextrose 10% bolus 125 mL (has no administration in time range)   dextrose 10% bolus 250 mL (has no administration in time range)   diphenhydrAMINE capsule 50 mg (has no administration in time range)   sodium chloride 0.9% flush 10 mL (has no administration in time range)   aspirin tablet 325 mg (325 mg Oral Given 6/15/22 1026)   nitroGLYCERIN 2% TD oint ointment 1 inch (1 inch Topical (Top) Given 6/15/22 1045)   HYDROmorphone injection 1 mg (1 mg Intravenous Given 6/15/22 1124)   ondansetron injection 4 mg (4  mg Intravenous Given 6/15/22 1124)   heparin 25,000 units in dextrose 5% (100 units/ml) IV bolus from bag INITIAL BOLUS (max bolus 4000 units) (4,000 Units Intravenous Bolus from Bag 6/15/22 1533)   0.9%  NaCl infusion (0 mL/hr Intravenous Stopped 6/16/22 1130)   furosemide injection 40 mg (40 mg Intravenous Given 6/15/22 1911)   furosemide injection 20 mg (20 mg Intravenous Given 6/16/22 1138)     Medical Decision Making:   ED Management:  I discussed patient's findings with the hospitalist who will evaluate patient emergency department for admission                      Clinical Impression:   Final diagnoses:  [R07.9] Chest pain  [I21.4] NSTEMI (non-ST elevated myocardial infarction)          ED Disposition Condition    Admit               Leonides Smith MD  06/16/22 3443

## 2022-06-16 ENCOUNTER — PATIENT MESSAGE (OUTPATIENT)
Dept: ADMINISTRATIVE | Facility: HOSPITAL | Age: 72
End: 2022-06-16
Payer: MEDICARE

## 2022-06-16 LAB
ANION GAP SERPL CALC-SCNC: 11 MMOL/L (ref 8–16)
APTT PPP: 130.8 SEC (ref 23.3–35.1)
APTT PPP: 143.1 SEC (ref 23.3–35.1)
APTT PPP: 35.5 SEC (ref 23.3–35.1)
BUN SERPL-MCNC: 40 MG/DL (ref 8–23)
CALCIUM SERPL-MCNC: 9.1 MG/DL (ref 8.7–10.5)
CHLORIDE SERPL-SCNC: 104 MMOL/L (ref 95–110)
CO2 SERPL-SCNC: 22 MMOL/L (ref 23–29)
CREAT SERPL-MCNC: 2.1 MG/DL (ref 0.5–1.4)
ERYTHROCYTE [DISTWIDTH] IN BLOOD BY AUTOMATED COUNT: 14.5 % (ref 11.5–14.5)
EST. GFR  (AFRICAN AMERICAN): 35 ML/MIN/1.73 M^2
EST. GFR  (NON AFRICAN AMERICAN): 30.3 ML/MIN/1.73 M^2
GLUCOSE SERPL-MCNC: 136 MG/DL (ref 70–110)
GLUCOSE SERPL-MCNC: 144 MG/DL (ref 70–110)
GLUCOSE SERPL-MCNC: 169 MG/DL (ref 70–110)
GLUCOSE SERPL-MCNC: 196 MG/DL (ref 70–110)
GLUCOSE SERPL-MCNC: 215 MG/DL (ref 70–110)
HCT VFR BLD AUTO: 35.1 % (ref 40–54)
HGB BLD-MCNC: 11.9 G/DL (ref 14–18)
INR PPP: 1.4
MAGNESIUM SERPL-MCNC: 1.8 MG/DL (ref 1.6–2.6)
MCH RBC QN AUTO: 30.4 PG (ref 27–31)
MCHC RBC AUTO-ENTMCNC: 33.9 G/DL (ref 32–36)
MCV RBC AUTO: 90 FL (ref 82–98)
PLATELET # BLD AUTO: 177 K/UL (ref 150–450)
PMV BLD AUTO: 10.2 FL (ref 9.2–12.9)
POTASSIUM SERPL-SCNC: 4 MMOL/L (ref 3.5–5.1)
PROTHROMBIN TIME: 15.9 SEC (ref 11.4–13.7)
RBC # BLD AUTO: 3.91 M/UL (ref 4.6–6.2)
SODIUM SERPL-SCNC: 137 MMOL/L (ref 136–145)
TROPONIN I SERPL DL<=0.01 NG/ML-MCNC: 17.23 NG/ML
TROPONIN I SERPL DL<=0.01 NG/ML-MCNC: 22.58 NG/ML
WBC # BLD AUTO: 11.26 K/UL (ref 3.9–12.7)

## 2022-06-16 PROCEDURE — C9113 INJ PANTOPRAZOLE SODIUM, VIA: HCPCS | Performed by: INTERNAL MEDICINE

## 2022-06-16 PROCEDURE — 80048 BASIC METABOLIC PNL TOTAL CA: CPT | Performed by: INTERNAL MEDICINE

## 2022-06-16 PROCEDURE — 85730 THROMBOPLASTIN TIME PARTIAL: CPT | Performed by: INTERNAL MEDICINE

## 2022-06-16 PROCEDURE — 36415 COLL VENOUS BLD VENIPUNCTURE: CPT | Performed by: INTERNAL MEDICINE

## 2022-06-16 PROCEDURE — 85610 PROTHROMBIN TIME: CPT | Performed by: INTERNAL MEDICINE

## 2022-06-16 PROCEDURE — 85027 COMPLETE CBC AUTOMATED: CPT | Performed by: INTERNAL MEDICINE

## 2022-06-16 PROCEDURE — 25000003 PHARM REV CODE 250: Performed by: INTERNAL MEDICINE

## 2022-06-16 PROCEDURE — 93010 EKG 12-LEAD: ICD-10-PCS | Mod: ,,, | Performed by: SPECIALIST

## 2022-06-16 PROCEDURE — 83735 ASSAY OF MAGNESIUM: CPT | Performed by: INTERNAL MEDICINE

## 2022-06-16 PROCEDURE — 25000003 PHARM REV CODE 250

## 2022-06-16 PROCEDURE — 21400001 HC TELEMETRY ROOM

## 2022-06-16 PROCEDURE — 93010 ELECTROCARDIOGRAM REPORT: CPT | Mod: ,,, | Performed by: SPECIALIST

## 2022-06-16 PROCEDURE — 84484 ASSAY OF TROPONIN QUANT: CPT | Mod: 91 | Performed by: INTERNAL MEDICINE

## 2022-06-16 PROCEDURE — 63600175 PHARM REV CODE 636 W HCPCS: Performed by: INTERNAL MEDICINE

## 2022-06-16 PROCEDURE — 85730 THROMBOPLASTIN TIME PARTIAL: CPT | Mod: 91 | Performed by: INTERNAL MEDICINE

## 2022-06-16 PROCEDURE — 84484 ASSAY OF TROPONIN QUANT: CPT | Performed by: INTERNAL MEDICINE

## 2022-06-16 PROCEDURE — 99233 SBSQ HOSP IP/OBS HIGH 50: CPT | Mod: ,,, | Performed by: INTERNAL MEDICINE

## 2022-06-16 PROCEDURE — 93010 ELECTROCARDIOGRAM REPORT: CPT | Mod: 76,,, | Performed by: SPECIALIST

## 2022-06-16 PROCEDURE — 63600175 PHARM REV CODE 636 W HCPCS

## 2022-06-16 PROCEDURE — 99233 PR SUBSEQUENT HOSPITAL CARE,LEVL III: ICD-10-PCS | Mod: ,,, | Performed by: INTERNAL MEDICINE

## 2022-06-16 PROCEDURE — 93005 ELECTROCARDIOGRAM TRACING: CPT | Performed by: SPECIALIST

## 2022-06-16 RX ORDER — FUROSEMIDE 10 MG/ML
20 INJECTION INTRAMUSCULAR; INTRAVENOUS ONCE
Status: COMPLETED | OUTPATIENT
Start: 2022-06-16 | End: 2022-06-16

## 2022-06-16 RX ORDER — HYDRALAZINE HYDROCHLORIDE 25 MG/1
50 TABLET, FILM COATED ORAL EVERY 8 HOURS
Status: DISCONTINUED | OUTPATIENT
Start: 2022-06-16 | End: 2022-06-17 | Stop reason: HOSPADM

## 2022-06-16 RX ADMIN — ISOSORBIDE DINITRATE 40 MG: 10 TABLET ORAL at 02:06

## 2022-06-16 RX ADMIN — TICAGRELOR 90 MG: 90 TABLET ORAL at 08:06

## 2022-06-16 RX ADMIN — PANTOPRAZOLE SODIUM 40 MG: 40 INJECTION, POWDER, LYOPHILIZED, FOR SOLUTION INTRAVENOUS at 05:06

## 2022-06-16 RX ADMIN — FUROSEMIDE 20 MG: 10 INJECTION, SOLUTION INTRAMUSCULAR; INTRAVENOUS at 11:06

## 2022-06-16 RX ADMIN — HYDRALAZINE HYDROCHLORIDE 25 MG: 25 TABLET ORAL at 02:06

## 2022-06-16 RX ADMIN — ASPIRIN 81 MG: 81 TABLET, COATED ORAL at 08:06

## 2022-06-16 RX ADMIN — CARVEDILOL 12.5 MG: 12.5 TABLET, FILM COATED ORAL at 04:06

## 2022-06-16 RX ADMIN — ATORVASTATIN CALCIUM 80 MG: 40 TABLET, FILM COATED ORAL at 08:06

## 2022-06-16 RX ADMIN — HEPARIN SODIUM 12 UNITS/KG/HR: 10000 INJECTION, SOLUTION INTRAVENOUS at 07:06

## 2022-06-16 RX ADMIN — HYDRALAZINE HYDROCHLORIDE 25 MG: 25 TABLET ORAL at 05:06

## 2022-06-16 RX ADMIN — HEPARIN SODIUM 12 UNITS/KG/HR: 10000 INJECTION, SOLUTION INTRAVENOUS at 12:06

## 2022-06-16 RX ADMIN — CARVEDILOL 12.5 MG: 12.5 TABLET, FILM COATED ORAL at 08:06

## 2022-06-16 RX ADMIN — ISOSORBIDE DINITRATE 40 MG: 10 TABLET ORAL at 08:06

## 2022-06-16 RX ADMIN — SODIUM CHLORIDE: 0.9 INJECTION, SOLUTION INTRAVENOUS at 06:06

## 2022-06-16 RX ADMIN — HYDRALAZINE HYDROCHLORIDE 50 MG: 25 TABLET ORAL at 10:06

## 2022-06-16 RX ADMIN — INSULIN ASPART 4 UNITS: 100 INJECTION, SOLUTION INTRAVENOUS; SUBCUTANEOUS at 08:06

## 2022-06-16 RX ADMIN — INSULIN ASPART 2 UNITS: 100 INJECTION, SOLUTION INTRAVENOUS; SUBCUTANEOUS at 05:06

## 2022-06-16 NOTE — PLAN OF CARE
[facility]  Initial Discharge Assessment       Primary Care Provider: Angelito Adams MD    Admission Diagnosis: NSTEMI (non-ST elevated myocardial infarction) [I21.4]    Admission Date: 6/15/2022  Expected Discharge Date:     Discharge Barriers Identified: (P) None    Payor: [employer] MANAGED MEDICARE / Plan: HU[employer] MEDICARE HMO / Product Type: Capitation /     Extended Emergency Contact Information  Primary Emergency Contact: OllieSima  Address: [address] [place] [address] [facility]  Home Phone: [phone]  Mobile Phone: [phone]  Relation: Spouse  Preferred language: English   needed? No    Discharge Plan A: (P) Home with family, Home  Discharge Plan B: (P) Home, Home with family      [facility] (Now [facility]) - [place], [place] - [address]  [address] [address]  Phone: [phone] Fax: [phone]    [id number] [place] [id number] [address]  [address] [place] [address]  Phone: [phone] Fax: [phone]      Initial Assessment (most recent)       Adult Discharge Assessment - 06/16/22 1124          Discharge Assessment    Assessment Type Discharge Planning Assessment (P)      Confirmed/corrected address, phone number and insurance Yes (P)      Confirmed Demographics Correct on Facesheet (P)      Source of Information patient;family (P)      When was your last doctors appointment? -- (P)    d/c from [facility] 6/9/22    Communicated DAREN with patient/caregiver Date not available/Unable to determine (P)      Reason For Admission chest pain, NSTEMI (P)      Lives With spouse (P)      Facility Arrived From: home (P)      Do you expect to return to your current living situation? Yes (P)      Do you have help at home or someone to help you manage your care at home? Yes (P)      Who are your caregiver(s) and their phone  number(s)? Sima Levin (Spouse)   [phone] (Mobile) (P)      Prior to hospitilization cognitive status: Alert/Oriented (P)      Current cognitive status: Alert/Oriented (P)      Walking or Climbing Stairs Difficulty none (P)      Dressing/Bathing Difficulty none (P)      Equipment Currently Used at Home none (P)      Readmission within 30 days? Yes (P)      Patient currently being followed by outpatient case management? No (P)      Do you currently have service(s) that help you manage your care at home? No (P)      Do you take prescription medications? Yes (P)      Do you have prescription coverage? Yes (P)      Coverage [employer] (P)      Do you have any problems affording any of your prescribed medications? No (P)      Is the patient taking medications as prescribed? yes (P)      Who is going to help you get home at discharge? Sima Levin (Spouse)   [phone] (Mobile) (P)      How do you get to doctors appointments? car, drives self;family or friend will provide (P)      Are you on dialysis? No (P)      Do you take coumadin? No (P)      Discharge Plan A Home with family;Home (P)      Discharge Plan B Home;Home with family (P)      DME Needed Upon Discharge  none (P)      Discharge Plan discussed with: Spouse/sig other;Patient (P)      Name(s) and Number(s) Sima Levin (Spouse)   [phone] (Mobile) (P)      Discharge Barriers Identified None (P)         Relationship/Environment    Name(s) of Who Lives With Patient Sima Levin (Spouse)   [phone] (Mobile) (P)

## 2022-06-16 NOTE — SUBJECTIVE & OBJECTIVE
Interval History:     Review of Systems  As per subjective   Objective:     Vital Signs (Most Recent):  Temp: 98.4 °F (36.9 °C) (vitals via student nurse) (06/16/22 1100)  Pulse: 63 (06/16/22 1100)  Resp: 16 (06/16/22 1100)  BP: (!) 147/72 (06/16/22 1100)  SpO2: 99 % (06/16/22 1100)   Vital Signs (24h Range):  Temp:  [97.9 °F (36.6 °C)-98.4 °F (36.9 °C)] 98.4 °F (36.9 °C)  Pulse:  [60-69] 63  Resp:  [16-18] 16  SpO2:  [98 %-99 %] 99 %  BP: (138-184)/(64-77) 147/72     Weight: 88.5 kg (195 lb)  Body mass index is 28.8 kg/m².    Intake/Output Summary (Last 24 hours) at 6/16/2022 1544  Last data filed at 6/16/2022 1453  Gross per 24 hour   Intake 1040 ml   Output 2425 ml   Net -1385 ml      Physical Exam  Constitutional:       General: He is not in acute distress.     Appearance: He is well-developed.   HENT:      Head: Normocephalic.   Eyes:      Pupils: Pupils are equal, round, and reactive to light.   Cardiovascular:      Rate and Rhythm: Normal rate and regular rhythm.   Pulmonary:      Effort: No respiratory distress.   Abdominal:      General: There is no distension.      Tenderness: There is no abdominal tenderness.   Musculoskeletal:         General: Normal range of motion.      Cervical back: Neck supple.   Skin:     Findings: No rash.   Neurological:      Mental Status: He is alert and oriented to person, place, and time.   Psychiatric:         Mood and Affect: Mood normal.       Significant Labs: All pertinent labs within the past 24 hours have been reviewed.  CMP:   Recent Labs   Lab 06/15/22  1007 06/15/22  1902 06/16/22  0520   * 137 137   K 4.5 4.6 4.0    104 104   CO2 20* 23 22*   * 185* 136*   BUN 43* 41* 40*   CREATININE 2.2* 1.9* 2.1*   CALCIUM 9.1 8.7 9.1   PROT 7.6 7.0  --    ALBUMIN 4.5 4.1  --    BILITOT 0.9 0.7  --    ALKPHOS 84 82  --    AST 39 93*  --    ALT 42 44  --    ANIONGAP 13 10 11   EGFRNONAA 28.7* 34.2* 30.3*     Cardiac Markers:   Recent Labs   Lab 06/15/22  1007    *     Coagulation:   Recent Labs   Lab 06/16/22  0520 06/16/22  1225   INR 1.4  --    APTT 130.8* 35.5*       Significant Imaging: I have reviewed all pertinent imaging results/findings within the past 24 hours.

## 2022-06-16 NOTE — PROGRESS NOTES
[facility] Medicine  Progress Note    Patient Name: Amadeo Levin  MRN: [id number]  Patient Class: IP- Inpatient   Admission Date: 6/15/2022  Length of Stay: 1 days  Attending Physician: Amor Orozco MD  Primary Care Provider: Angelito Adams MD        Subjective:     Principal Problem:NSTEMI (non-ST elevated myocardial infarction)        HPI:  Patient is a 71-year-old  male with known CAD, recent angioplasty in April 2022, CKD stage 3 and type 2 diabetes mellitus presents to the ED with chief complaint of chest pain.    He was hospitalized here last week for decompensated systolic congestive heart failure and NSTEMI. He was treated medically with heparin drip.  He was initiated on p.r.n. furosemide with improvement in his volume overload.    He presents today with acute episode of substernal chest discomfort which started after gardening.  It is burning in nature with radiation to his midline neck.  No associated diaphoresis or shortness of breath.  Reports compliance with his medications.  Symptoms are moderate in nature.  No exacerbating or relieving factors. He is scheduled to see Dr. Vernon at [facility] for brachytherapy of his CAD later this month.    Rest of the 10 point review of systems is negative except as mentioned above.      Overview/Hospital Course:  Examined discussed with patient wife and cardiology team  Troponin jumped up very high but later patient decided to stay here and get renal function get better  EKG repeated and no change . Old LBBB      Interval History:     Review of Systems  As per subjective   Objective:     Vital Signs (Most Recent):  Temp: 98.4 °F (36.9 °C) (vitals via student nurse) (06/16/22 1100)  Pulse: 63 (06/16/22 1100)  Resp: 16 (06/16/22 1100)  BP: (!) 147/72 (06/16/22 1100)  SpO2: 99 % (06/16/22 1100)   Vital Signs (24h Range):  Temp:  [97.9 °F (36.6 °C)-98.4 °F (36.9 °C)] 98.4 °F (36.9 °C)  Pulse:  [60-69] 63  Resp:  [16-18] 16  SpO2:   [98 %-99 %] 99 %  BP: (138-184)/(64-77) 147/72     Weight: 88.5 kg (195 lb)  Body mass index is 28.8 kg/m².    Intake/Output Summary (Last 24 hours) at 6/16/2022 1544  Last data filed at 6/16/2022 1453  Gross per 24 hour   Intake 1040 ml   Output 2425 ml   Net -1385 ml      Physical Exam  Constitutional:       General: He is not in acute distress.     Appearance: He is well-developed.   HENT:      Head: Normocephalic.   Eyes:      Pupils: Pupils are equal, round, and reactive to light.   Cardiovascular:      Rate and Rhythm: Normal rate and regular rhythm.   Pulmonary:      Effort: No respiratory distress.   Abdominal:      General: There is no distension.      Tenderness: There is no abdominal tenderness.   Musculoskeletal:         General: Normal range of motion.      Cervical back: Neck supple.   Skin:     Findings: No rash.   Neurological:      Mental Status: He is alert and oriented to person, place, and time.   Psychiatric:         Mood and Affect: Mood normal.       Significant Labs: All pertinent labs within the past 24 hours have been reviewed.  CMP:   Recent Labs   Lab 06/15/22  1007 06/15/22  1902 06/16/22  0520   * 137 137   K 4.5 4.6 4.0    104 104   CO2 20* 23 22*   * 185* 136*   BUN 43* 41* 40*   CREATININE 2.2* 1.9* 2.1*   CALCIUM 9.1 8.7 9.1   PROT 7.6 7.0  --    ALBUMIN 4.5 4.1  --    BILITOT 0.9 0.7  --    ALKPHOS 84 82  --    AST 39 93*  --    ALT 42 44  --    ANIONGAP 13 10 11   EGFRNONAA 28.7* 34.2* 30.3*     Cardiac Markers:   Recent Labs   Lab 06/15/22  1007   *     Coagulation:   Recent Labs   Lab 06/16/22  0520 06/16/22  1225   INR 1.4  --    APTT 130.8* 35.5*       Significant Imaging: I have reviewed all pertinent imaging results/findings within the past 24 hours.      Assessment/Plan:      Active Hospital Problems    Diagnosis    *NSTEMI (non-ST elevated myocardial infarction)    Acute renal failure superimposed on stage 3a chronic kidney disease     Hypertensive emergency    Type 2 diabetes mellitus with stage 3b chronic kidney disease, without long-term current use of insulin    Coronary artery disease        Plan:     hypertensive emergency better   Another dose of lasix given    heparin drip  Patient decided to stay here and renal function better and will follow up with Dr mae as OP  Continue aspirin and ticagrelor and statin  And  carvedilol and nitrates  IV labetalol p.r.n. with parameters for hypertensive emergency  Renal function worsen and may get worse after lasix       VTE Risk Mitigation (From admission, onward)         Ordered     IP VTE HIGH RISK PATIENT  Once         06/15/22 1416     Place sequential compression device  Until discontinued         06/15/22 1416     Reason for No Pharmacological VTE Prophylaxis  Once        Question:  Reasons:  Answer:  IV Heparin w/in 24 hrs. Pre or Post-Op    06/15/22 1416     heparin 25,000 units in dextrose 5% (100 units/ml) IV bolus from bag - ADDITIONAL PRN BOLUS - 60 units/kg (max bolus 4000 units)  As needed (PRN)        Question:  Heparin Infusion Adjustment (DO NOT MODIFY ANSWER)  Answer:  \\[email or web address]\epic\Images\Pharmacy\HeparinInfusions\heparin LOW INTENSITY nomogram for [facility] [id number]    06/15/22 1302     heparin 25,000 units in dextrose 5% (100 units/ml) IV bolus from bag - ADDITIONAL PRN BOLUS - 30 units/kg (max bolus 4000 units)  As needed (PRN)        Question:  Heparin Infusion Adjustment (DO NOT MODIFY ANSWER)  Answer:  \\[email or web address]\epic\Images\Pharmacy\HeparinInfusions\heparin LOW INTENSITY nomogram for [facility] [id number].pdf    06/15/22 1302     heparin 25,000 units in dextrose 5% 250 mL (100 units/mL) infusion LOW INTENSITY nomogram - [facility]  Continuous        Question Answer Comment   Heparin Infusion Adjustment (DO NOT MODIFY ANSWER) [email or web address]\epic\Images\Pharmacy\HeparinInfusions\heparin LOW INTENSITY nomogram for [facility] [id number].pdf    Begin at (in units/kg/hr) 12        06/15/22 1302                 Discharge Planning   DAREN: 6/18/2022     Code Status: Full Code   Is the patient medically ready for discharge?:     Reason for patient still in hospital (select all that apply): Treatment  Discharge Plan A: Home with family, Home                  Amor Orozco MD  Department of Hospital Medicine   [facility]

## 2022-06-16 NOTE — PROGRESS NOTES
[facility]  Department of Cardiology  Progress Note    PATIENT NAME: Amadeo Levin  MRN: [id number]  TODAY'S DATE: 06/16/2022  ADMIT DATE: 6/15/2022    SUBJECTIVE     PRINCIPLE PROBLEM: NSTEMI (non-ST elevated myocardial infarction)    INTERVAL HISTORY:    6/16/2022  Patient remains asymptomatic. Repeat troponin 22. He wishes to stay here until his labs stabilize. Does not want to undergo angiographic assessment today. He is tearful.     Review of patient's allergies indicates:   Allergen Reactions    Morphine Nausea And Vomiting    Vicodin [hydrocodone-acetaminophen] Nausea And Vomiting       REVIEW OF SYSTEMS  CARDIOVASCULAR: No recent chest pain, palpitations, arm, neck, or jaw pain  RESPIRATORY: No recent fever, cough chills, SOB or congestion  : No blood in the urine  GI: No Nausea, vomiting, constipation, diarrhea, blood, or reflux.  MUSCULOSKELETAL: No myalgias  NEURO: No lightheadedness or dizziness  EYES: No Double vision, blurry, vision or headache     OBJECTIVE     VITAL SIGNS (Most Recent)  Temp: 98.1 °F (36.7 °C) (vitals via student nurse) (06/16/22 0700)  Pulse: 69 (06/16/22 0700)  Resp: 18 (06/16/22 0700)  BP: (!) 184/77 (06/16/22 0700)  SpO2: 99 % (06/16/22 0700)    VENTILATION STATUS  Resp: 18 (06/16/22 0700)  SpO2: 99 % (06/16/22 0700)       I & O (Last 24H):    Intake/Output Summary (Last 24 hours) at 6/16/2022 1032  Last data filed at 6/16/2022 0841  Gross per 24 hour   Intake 240 ml   Output 2200 ml   Net -1960 ml       WEIGHTS  Wt Readings from Last 3 Encounters:   06/16/22 0400 88.5 kg (195 lb)   06/15/22 1700 89.5 kg (197 lb 5 oz)   06/15/22 1004 89.8 kg (198 lb)   06/08/22 0612 94 kg (207 lb 3.7 oz)   06/07/22 2200 90.7 kg (200 lb)   06/08/22 1050 94 kg (207 lb 3.7 oz)       PHYSICAL EXAM  CONSTITUTIONAL: Well built, well nourished in no apparent distress  NECK: no carotid bruit, no JVD  LUNGS: CTA  CHEST WALL: no tenderness  HEART: regular rate and rhythm, S1, S2 normal, no  murmur, click, rub or gallop   ABDOMEN: soft, non-tender; bowel sounds normal; no masses,  no organomegaly  EXTREMITIES: Extremities normal, no edema  NEURO: AAO X 3    SCHEDULED MEDS:   aspirin  81 mg Oral Daily    atorvastatin  80 mg Oral QHS    carvediloL  12.5 mg Oral BID WM    hydrALAZINE  25 mg Oral Q8H    isosorbide dinitrate  40 mg Oral TID    pantoprazole  40 mg Intravenous Daily    ticagrelor  90 mg Oral BID       CONTINUOUS INFUSIONS:   heparin (porcine) in D5W 12 Units/kg/hr (06/16/22 0758)       PRN MEDS:acetaminophen, dextrose 10%, dextrose 10%, diphenhydrAMINE, heparin (PORCINE), heparin (PORCINE), insulin aspart U-100, labetalol, magnesium oxide, magnesium oxide, melatonin, naloxone, ondansetron, polyethylene glycol, potassium bicarbonate, potassium bicarbonate, potassium bicarbonate, potassium, sodium phosphates, potassium, sodium phosphates, potassium, sodium phosphates, sodium chloride 0.9%    LABS AND DIAGNOSTICS     CBC LAST 3 DAYS  Recent Labs   Lab 06/15/22  1007 06/16/22  0520   WBC 8.69 11.26   RBC 4.06* 3.91*   HGB 12.3* 11.9*   HCT 36.7* 35.1*   MCV 90 90   MCH 30.3 30.4   MCHC 33.5 33.9   RDW 14.5 14.5    177   MPV 10.4 10.2   GRAN 75.8*  6.6  --    LYMPH 13.1*  1.1  --    MONO 7.0  0.6  --    BASO 0.08  --    NRBC 0  --        COAGULATION LAST 3 DAYS  Recent Labs   Lab 06/15/22  1424 06/15/22  2304 06/16/22  0520   LABPT 15.2*  --  15.9*   INR 1.3  --  1.4   APTT 25.0 39.5* 130.8*       CHEMISTRY LAST 3 DAYS  Recent Labs   Lab 06/15/22  1007 06/15/22  1902 06/16/22  0520   * 137 137   K 4.5 4.6 4.0    104 104   CO2 20* 23 22*   ANIONGAP 13 10 11   BUN 43* 41* 40*   CREATININE 2.2* 1.9* 2.1*   * 185* 136*   CALCIUM 9.1 8.7 9.1   MG 1.7  --  1.8   ALBUMIN 4.5 4.1  --    PROT 7.6 7.0  --    ALKPHOS 84 82  --    ALT 42 44  --    AST 39 93*  --    BILITOT 0.9 0.7  --        CARDIAC PROFILE LAST 3 DAYS  Recent Labs   Lab 06/15/22  1007 06/16/22  0807   BNP  228*  --    TROPONINI 0.074* 22.577*       ENDOCRINE LAST 3 DAYS  No results for input(s): TSH, PROCAL in the last 168 hours.    LAST ARTERIAL BLOOD GAS  ABG  No results for input(s): PH, PO2, PCO2, HCO3, BE in the last 168 hours.    LAST 7 DAYS MICROBIOLOGY   Microbiology Results (last 7 days)     ** No results found for the last 168 hours. **          MOST RECENT IMAGING  X-Ray Chest AP Portable  CLINICAL HISTORY:  71 years (1950) Male Chest Pain Chest Pain (Pt reports chestpain with jaw pain that started about 0830 this morning, hx of 5 stents, ablation scheduled for 6/24)    TECHNIQUE:  Portable AP radiograph the chest.    COMPARISON:  Most recent radiograph from June 9, 2022    FINDINGS:  There are faint linear opacities at the lung bases suggestive of atelectasis/scarring, aspiration/pneumonia or trace edema in the appropriate clinical setting. Costophrenic angles are seen without effusion. No pneumothorax is identified. The heart is mildly enlarged. Atheromatous calcifications are seen at the aortic arch. Osseous structures appear unchanged. The visualized upper abdomen is unremarkable.    IMPRESSION:  Borderline cardiomegaly and findings of mild interstitial pulmonary edema at the lung bases.    .    Electronically signed by:  Wally Loving MD  6/15/2022 10:32 AM CDT Workstation: [id number]      [employer]  Results for orders placed during the hospital encounter of 06/07/22    Echo    Interpretation Summary  · The left ventricle is normal in size with concentric remodeling and mildly decreased systolic function.  · The estimated ejection fraction is 40%.  · Left ventricular diastolic dysfunction.  · There is abnormal septal wall motion consistent with left bundle branch block.  · Normal right ventricular size with normal right ventricular systolic function.  · Mild left atrial enlargement.  · Mild tricuspid regurgitation.  · Normal central venous pressure (3 mmHg).      CURRENT/PREVIOUS VISIT  EKG  Results for orders placed or performed during the hospital encounter of 06/15/22   EKG 12-lead    Collection Time: 06/16/22  7:32 AM    Narrative    Test Reason : R07.9,    Vent. Rate : 067 BPM     Atrial Rate : 067 BPM     P-R Int : 158 ms          QRS Dur : 148 ms      QT Int : 512 ms       P-R-T Axes : 050 001 181 degrees     QTc Int : 541 ms    Normal sinus rhythm  Left bundle branch block  Abnormal ECG  When compared with ECG of 16-JUN-2022 07:00,  No significant change was found    Referred By: [email or web address]   SELF           Confirmed By:        ASSESSMENT/PLAN:     Active Hospital Problems    Diagnosis    *NSTEMI (non-ST elevated myocardial infarction)    Acute renal failure superimposed on stage 3a chronic kidney disease    Hypertensive emergency    Type 2 diabetes mellitus with stage 3b chronic kidney disease, without long-term current use of insulin    Coronary artery disease       ASSESSMENT & PLAN:   # NSTEMI   # CAD s/p angioplastic RCA/PDA   # EMILEE on CKD   # HTN emergency   # Chronic systolic heart failure         RECOMMENDATIONS:  Continue to trend troponin   Continue heparin gtt   Discussed with patient and wife at bedside, he wishes to remain here and let troponin and kidney levels stabilize and then go home to see Dr. Vernon as an outpatient   Give small dose IV lasix 20 mg today   Repeat CXR tomorrow     Yolette Rothman PA-C  [facility]  Department of Cardiology  Date of Service: 06/16/2022

## 2022-06-16 NOTE — CARE UPDATE
Pt states he does not want to have an angio today. Dr Vernon notified by phone call.       Katt BLISS.

## 2022-06-16 NOTE — HOSPITAL COURSE
HPI: Patient is a 71-year-old  male with known CAD, recent angioplasty in April 2022, CKD stage 3 and type 2 diabetes mellitus presents to the ED with chief complaint of chest pain.     He was hospitalized here last week for decompensated systolic congestive heart failure and NSTEMI. He was treated medically with heparin drip.  He was initiated on p.r.n. furosemide with improvement in his volume overload.     He presents today with acute episode of substernal chest discomfort which started after gardening.  It is burning in nature with radiation to his midline neck.  No associated diaphoresis or shortness of breath.  Reports compliance with his medications.  Symptoms are moderate in nature.  No exacerbating or relieving factors. He is scheduled to see Dr. Vernon at [facility] for brachytherapy of his CAD later this month.     Rest of the 10 point review of systems is negative except as mentioned above.    Hospital course  Patient with known CAD, recent angioplasty in April ,chronic kidney disease was admitted with non STEMI.  He already had communicated with Dr. Vernon off for high risk PCI in the past and he has appointment.  He came in complaining of chest pain  and later  troponin level was as high as 24 but patient chest pain resolved later and troponin was trending down.  Because of patient is asymptomatic later and decision was made to discharge home with p.r.n. Lasix and hydralazine and  patient walked in the cardiology unit without any problem and  discharged stable condition to follow with Dr. Vernon for brachytherapy next week

## 2022-06-17 VITALS
SYSTOLIC BLOOD PRESSURE: 122 MMHG | TEMPERATURE: 98 F | WEIGHT: 189.63 LBS | RESPIRATION RATE: 16 BRPM | OXYGEN SATURATION: 99 % | BODY MASS INDEX: 28.08 KG/M2 | HEART RATE: 75 BPM | DIASTOLIC BLOOD PRESSURE: 57 MMHG | HEIGHT: 69 IN

## 2022-06-17 LAB
ANION GAP SERPL CALC-SCNC: 8 MMOL/L (ref 8–16)
APTT PPP: 103.7 SEC (ref 23.3–35.1)
APTT PPP: 105.2 SEC (ref 23.3–35.1)
APTT PPP: 108.8 SEC (ref 23.3–35.1)
APTT PPP: 110.5 SEC (ref 23.3–35.1)
BNP SERPL-MCNC: 249 PG/ML (ref 0–99)
BUN SERPL-MCNC: 47 MG/DL (ref 8–23)
CALCIUM SERPL-MCNC: 9 MG/DL (ref 8.7–10.5)
CHLORIDE SERPL-SCNC: 106 MMOL/L (ref 95–110)
CO2 SERPL-SCNC: 24 MMOL/L (ref 23–29)
CREAT SERPL-MCNC: 2.3 MG/DL (ref 0.5–1.4)
ERYTHROCYTE [DISTWIDTH] IN BLOOD BY AUTOMATED COUNT: 14.4 % (ref 11.5–14.5)
EST. GFR  (AFRICAN AMERICAN): 31.4 ML/MIN/1.73 M^2
EST. GFR  (NON AFRICAN AMERICAN): 27.2 ML/MIN/1.73 M^2
GLUCOSE SERPL-MCNC: 140 MG/DL (ref 70–110)
GLUCOSE SERPL-MCNC: 169 MG/DL (ref 70–110)
GLUCOSE SERPL-MCNC: 220 MG/DL (ref 70–110)
HCT VFR BLD AUTO: 32.5 % (ref 40–54)
HGB BLD-MCNC: 11 G/DL (ref 14–18)
MAGNESIUM SERPL-MCNC: 1.7 MG/DL (ref 1.6–2.6)
MCH RBC QN AUTO: 29.9 PG (ref 27–31)
MCHC RBC AUTO-ENTMCNC: 33.8 G/DL (ref 32–36)
MCV RBC AUTO: 88 FL (ref 82–98)
PLATELET # BLD AUTO: 168 K/UL (ref 150–450)
PMV BLD AUTO: 10.4 FL (ref 9.2–12.9)
POTASSIUM SERPL-SCNC: 3.9 MMOL/L (ref 3.5–5.1)
RBC # BLD AUTO: 3.68 M/UL (ref 4.6–6.2)
SODIUM SERPL-SCNC: 138 MMOL/L (ref 136–145)
WBC # BLD AUTO: 7.9 K/UL (ref 3.9–12.7)

## 2022-06-17 PROCEDURE — 83735 ASSAY OF MAGNESIUM: CPT | Performed by: INTERNAL MEDICINE

## 2022-06-17 PROCEDURE — 82962 GLUCOSE BLOOD TEST: CPT

## 2022-06-17 PROCEDURE — 85730 THROMBOPLASTIN TIME PARTIAL: CPT | Performed by: INTERNAL MEDICINE

## 2022-06-17 PROCEDURE — 85730 THROMBOPLASTIN TIME PARTIAL: CPT | Mod: 91 | Performed by: INTERNAL MEDICINE

## 2022-06-17 PROCEDURE — 80048 BASIC METABOLIC PNL TOTAL CA: CPT | Performed by: INTERNAL MEDICINE

## 2022-06-17 PROCEDURE — C9113 INJ PANTOPRAZOLE SODIUM, VIA: HCPCS | Performed by: INTERNAL MEDICINE

## 2022-06-17 PROCEDURE — 25000003 PHARM REV CODE 250: Performed by: INTERNAL MEDICINE

## 2022-06-17 PROCEDURE — 63600175 PHARM REV CODE 636 W HCPCS: Performed by: INTERNAL MEDICINE

## 2022-06-17 PROCEDURE — 83880 ASSAY OF NATRIURETIC PEPTIDE: CPT | Performed by: INTERNAL MEDICINE

## 2022-06-17 PROCEDURE — 99233 SBSQ HOSP IP/OBS HIGH 50: CPT | Mod: ,,, | Performed by: INTERNAL MEDICINE

## 2022-06-17 PROCEDURE — 85027 COMPLETE CBC AUTOMATED: CPT | Performed by: INTERNAL MEDICINE

## 2022-06-17 PROCEDURE — 99233 PR SUBSEQUENT HOSPITAL CARE,LEVL III: ICD-10-PCS | Mod: ,,, | Performed by: INTERNAL MEDICINE

## 2022-06-17 PROCEDURE — 36415 COLL VENOUS BLD VENIPUNCTURE: CPT | Performed by: INTERNAL MEDICINE

## 2022-06-17 RX ORDER — METFORMIN HYDROCHLORIDE 1000 MG/1
1000 TABLET ORAL 2 TIMES DAILY WITH MEALS
Start: 2022-06-17 | End: 2022-07-06

## 2022-06-17 RX ORDER — FUROSEMIDE 20 MG/1
20 TABLET ORAL
Status: DISCONTINUED | OUTPATIENT
Start: 2022-06-17 | End: 2022-06-17 | Stop reason: HOSPADM

## 2022-06-17 RX ORDER — FUROSEMIDE 20 MG/1
20 TABLET ORAL
Qty: 60 TABLET | Refills: 0 | Status: SHIPPED | OUTPATIENT
Start: 2022-06-17 | End: 2022-06-17 | Stop reason: SDUPTHER

## 2022-06-17 RX ORDER — HYDRALAZINE HYDROCHLORIDE 50 MG/1
50 TABLET, FILM COATED ORAL EVERY 8 HOURS
Qty: 120 TABLET | Refills: 0 | Status: SHIPPED | OUTPATIENT
Start: 2022-06-17 | End: 2022-07-27 | Stop reason: SDUPTHER

## 2022-06-17 RX ORDER — HYDRALAZINE HYDROCHLORIDE 50 MG/1
50 TABLET, FILM COATED ORAL EVERY 8 HOURS
Qty: 120 TABLET | Refills: 0 | Status: SHIPPED | OUTPATIENT
Start: 2022-06-17 | End: 2022-06-17 | Stop reason: SDUPTHER

## 2022-06-17 RX ORDER — FUROSEMIDE 20 MG/1
20 TABLET ORAL
Qty: 60 TABLET | Refills: 0 | Status: SHIPPED | OUTPATIENT
Start: 2022-06-17 | End: 2022-11-04

## 2022-06-17 RX ADMIN — INSULIN ASPART 2 UNITS: 100 INJECTION, SOLUTION INTRAVENOUS; SUBCUTANEOUS at 08:06

## 2022-06-17 RX ADMIN — CARVEDILOL 12.5 MG: 12.5 TABLET, FILM COATED ORAL at 08:06

## 2022-06-17 RX ADMIN — ISOSORBIDE DINITRATE 40 MG: 10 TABLET ORAL at 08:06

## 2022-06-17 RX ADMIN — TICAGRELOR 90 MG: 90 TABLET ORAL at 08:06

## 2022-06-17 RX ADMIN — ASPIRIN 81 MG: 81 TABLET, COATED ORAL at 08:06

## 2022-06-17 RX ADMIN — HYDRALAZINE HYDROCHLORIDE 50 MG: 25 TABLET ORAL at 05:06

## 2022-06-17 RX ADMIN — FUROSEMIDE 20 MG: 20 TABLET ORAL at 12:06

## 2022-06-17 RX ADMIN — PANTOPRAZOLE SODIUM 40 MG: 40 INJECTION, POWDER, LYOPHILIZED, FOR SOLUTION INTRAVENOUS at 05:06

## 2022-06-17 RX ADMIN — HEPARIN SODIUM 9 UNITS/KG/HR: 10000 INJECTION, SOLUTION INTRAVENOUS at 08:06

## 2022-06-17 RX ADMIN — INSULIN ASPART 4 UNITS: 100 INJECTION, SOLUTION INTRAVENOUS; SUBCUTANEOUS at 12:06

## 2022-06-17 NOTE — PROGRESS NOTES
[facility]  Department of Cardiology  Progress Note    PATIENT NAME: Amadeo Levin  MRN: [id number]  TODAY'S DATE: 06/17/2022  ADMIT DATE: 6/15/2022    SUBJECTIVE     PRINCIPLE PROBLEM: NSTEMI (non-ST elevated myocardial infarction)    INTERVAL HISTORY:    6/17/2022  Patient remains asymptomatic. Repeat troponin 22. He wishes to stay here until his labs stabilize. Does not want to undergo angiographic assessment today. He is tearful.     Review of patient's allergies indicates:   Allergen Reactions    Morphine Nausea And Vomiting    Vicodin [hydrocodone-acetaminophen] Nausea And Vomiting       REVIEW OF SYSTEMS  CARDIOVASCULAR: No recent chest pain, palpitations, arm, neck, or jaw pain  RESPIRATORY: No recent fever, cough chills, SOB or congestion  : No blood in the urine  GI: No Nausea, vomiting, constipation, diarrhea, blood, or reflux.  MUSCULOSKELETAL: No myalgias  NEURO: No lightheadedness or dizziness  EYES: No Double vision, blurry, vision or headache     OBJECTIVE     VITAL SIGNS (Most Recent)  Temp: 98.3 °F (36.8 °C) (06/17/22 1207)  Pulse: 75 (06/17/22 1207)  Resp: 16 (06/17/22 1207)  BP: (!) 122/57 (06/17/22 1207)  SpO2: 99 % (06/17/22 1207)    VENTILATION STATUS  Resp: 16 (06/17/22 1207)  SpO2: 99 % (06/17/22 1207)       I & O (Last 24H):    Intake/Output Summary (Last 24 hours) at 6/17/2022 1258  Last data filed at 6/17/2022 0900  Gross per 24 hour   Intake 800 ml   Output 1050 ml   Net -250 ml       WEIGHTS  Wt Readings from Last 3 Encounters:   06/17/22 0339 86 kg (189 lb 9.5 oz)   06/16/22 0400 88.5 kg (195 lb)   06/15/22 1700 89.5 kg (197 lb 5 oz)   06/15/22 1004 89.8 kg (198 lb)   06/08/22 0612 94 kg (207 lb 3.7 oz)   06/07/22 2200 90.7 kg (200 lb)   06/08/22 1050 94 kg (207 lb 3.7 oz)       PHYSICAL EXAM  CONSTITUTIONAL: Well built, well nourished in no apparent distress  NECK: no carotid bruit, no JVD  LUNGS: CTA  CHEST WALL: no tenderness  HEART: regular rate and rhythm, S1, S2  normal, no murmur, click, rub or gallop   ABDOMEN: soft, non-tender; bowel sounds normal; no masses,  no organomegaly  EXTREMITIES: Extremities normal, no edema  NEURO: AAO X 3    SCHEDULED MEDS:   aspirin  81 mg Oral Daily    atorvastatin  80 mg Oral QHS    carvediloL  12.5 mg Oral BID WM    furosemide  20 mg Oral Once per day on Mon Wed Fri    hydrALAZINE  50 mg Oral Q8H    isosorbide dinitrate  40 mg Oral TID    pantoprazole  40 mg Intravenous Daily    ticagrelor  90 mg Oral BID       CONTINUOUS INFUSIONS:   heparin (porcine) in D5W 9 Units/kg/hr (06/17/22 0857)       PRN MEDS:acetaminophen, dextrose 10%, dextrose 10%, diphenhydrAMINE, heparin (PORCINE), heparin (PORCINE), insulin aspart U-100, labetalol, magnesium oxide, magnesium oxide, melatonin, naloxone, ondansetron, polyethylene glycol, potassium bicarbonate, potassium bicarbonate, potassium bicarbonate, potassium, sodium phosphates, potassium, sodium phosphates, potassium, sodium phosphates, sodium chloride 0.9%    LABS AND DIAGNOSTICS     CBC LAST 3 DAYS  Recent Labs   Lab 06/15/22  1007 06/16/22  0520 06/17/22  0429   WBC 8.69 11.26 7.90   RBC 4.06* 3.91* 3.68*   HGB 12.3* 11.9* 11.0*   HCT 36.7* 35.1* 32.5*   MCV 90 90 88   MCH 30.3 30.4 29.9   MCHC 33.5 33.9 33.8   RDW 14.5 14.5 14.4    177 168   MPV 10.4 10.2 10.4   GRAN 75.8*  6.6  --   --    LYMPH 13.1*  1.1  --   --    MONO 7.0  0.6  --   --    BASO 0.08  --   --    NRBC 0  --   --        COAGULATION LAST 3 DAYS  Recent Labs   Lab 06/15/22  1424 06/15/22  2304 06/16/22  0520 06/16/22  1225 06/16/22  2349 06/17/22  0429 06/17/22  0643   LABPT 15.2*  --  15.9*  --   --   --   --    INR 1.3  --  1.4  --   --   --   --    APTT 25.0   < > 130.8*   < > 103.7* 105.2*  108.8* 110.5*    < > = values in this interval not displayed.       CHEMISTRY LAST 3 DAYS  Recent Labs   Lab 06/15/22  1007 06/15/22  1902 06/16/22  0520 06/17/22  0429   * 137 137 138   K 4.5 4.6 4.0 3.9     104 104 106   CO2 20* 23 22* 24   ANIONGAP 13 10 11 8   BUN 43* 41* 40* 47*   CREATININE 2.2* 1.9* 2.1* 2.3*   * 185* 136* 140*   CALCIUM 9.1 8.7 9.1 9.0   MG 1.7  --  1.8 1.7   ALBUMIN 4.5 4.1  --   --    PROT 7.6 7.0  --   --    ALKPHOS 84 82  --   --    ALT 42 44  --   --    AST 39 93*  --   --    BILITOT 0.9 0.7  --   --        CARDIAC PROFILE LAST 3 DAYS  Recent Labs   Lab 06/15/22  1007 06/16/22  0807 06/16/22 2014 06/17/22  0429   *  --   --  249*   TROPONINI 0.074* 22.577* 17.230*  --        ENDOCRINE LAST 3 DAYS  No results for input(s): TSH, PROCAL in the last 168 hours.    LAST ARTERIAL BLOOD GAS  ABG  No results for input(s): PH, PO2, PCO2, HCO3, BE in the last 168 hours.    LAST 7 DAYS MICROBIOLOGY   Microbiology Results (last 7 days)     ** No results found for the last 168 hours. **          MOST RECENT IMAGING  X-Ray Chest AP Portable  Narrative: EXAMINATION:  XR CHEST AP PORTABLE    CLINICAL HISTORY:  chf; Non-ST elevation (NSTEMI) myocardial infarction    FINDINGS:  Portable chest at 625 compared with 06/15/2022 shows normal cardiomediastinal silhouette.    Prior bilateral pulmonary opacities have resolved.  Lungs are now clear.  Pulmonary vasculature is normal. No acute osseous abnormality.  Impression: Resolution of prior bilateral pulmonary opacities.    Electronically signed by: Moreno Dolan MD  Date:    06/17/2022  Time:    07:16      [facility]  Results for orders placed during the hospital encounter of 06/07/22    Echo    Interpretation Summary  · The left ventricle is normal in size with concentric remodeling and mildly decreased systolic function.  · The estimated ejection fraction is 40%.  · Left ventricular diastolic dysfunction.  · There is abnormal septal wall motion consistent with left bundle branch block.  · Normal right ventricular size with normal right ventricular systolic function.  · Mild left atrial enlargement.  · Mild tricuspid regurgitation.  · Normal central  venous pressure (3 mmHg).      CURRENT/PREVIOUS VISIT EKG  Results for orders placed or performed during the hospital encounter of 06/15/22   EKG 12-lead    Collection Time: 06/16/22  7:32 AM    Narrative    Test Reason : R07.9,    Vent. Rate : 067 BPM     Atrial Rate : 067 BPM     P-R Int : 158 ms          QRS Dur : 148 ms      QT Int : 512 ms       P-R-T Axes : 050 001 181 degrees     QTc Int : 541 ms    Normal sinus rhythm  Left bundle branch block  Abnormal ECG  When compared with ECG of 16-JUN-2022 07:00,  No significant change was found    Referred By: [email or web address]   SELF           Confirmed By:        ASSESSMENT/PLAN:     Active Hospital Problems    Diagnosis    *NSTEMI (non-ST elevated myocardial infarction)    Acute renal failure superimposed on stage 3a chronic kidney disease    Hypertensive emergency    Type 2 diabetes mellitus with stage 3b chronic kidney disease, without long-term current use of insulin    Coronary artery disease       ASSESSMENT & PLAN:   # NSTEMI   # CAD s/p angioplastic RCA/PDA   # EMILEE on CKD   # HTN emergency   # Chronic systolic heart failure         RECOMMENDATIONS:  Troponin down trended   Patient has walked around and is symptom free   He can d/c home with outpatient follow up in our clinic and to see Dr. Vernon for brachytherpay next week     Yolette Rothman PA-C  [facility]  Department of Cardiology  Date of Service: 06/17/2022

## 2022-06-17 NOTE — PLAN OF CARE
Discharge orders and chart reviewed with no further post-acute discharge needs identified at this time.  At this time, patient is cleared for discharge from Case Management standpoint.        06/17/22 1253   Final Note   Assessment Type Final Discharge Note   Anticipated Discharge Disposition Home   Hospital Resources/Appts/Education Provided Appointments scheduled and added to AVS   Post-Acute Status   Post-Acute Authorization Other   Other Status No Post-Acute Service Needs   Discharge Delays None known at this time

## 2022-06-17 NOTE — PROGRESS NOTES
Discharge instructions provided to pt, pt voices understanding. IV/tele monitoring discontinued. Pt's daughter gathering belongings.

## 2022-06-18 NOTE — DISCHARGE SUMMARY
Critical access hospital Medicine  Discharge Summary      Patient Name: Amadeo Levin  MRN: [id number]  Patient Class: IP- Inpatient  Admission Date: 6/15/2022  Hospital Length of Stay: 2 days  Discharge Date and Time:  06/18/2022 5:02 PM  Attending Physician: No att. providers found   Discharging Provider: Amor Orozco MD  Primary Care Provider: Angelito Adams MD      HPI:   Patient is a 71-year-old  male with known CAD, recent angioplasty in April 2022, CKD stage 3 and type 2 diabetes mellitus presents to the ED with chief complaint of chest pain.    He was hospitalized here last week for decompensated systolic congestive heart failure and NSTEMI. He was treated medically with heparin drip.  He was initiated on p.r.n. furosemide with improvement in his volume overload.    He presents today with acute episode of substernal chest discomfort which started after gardening.  It is burning in nature with radiation to his midline neck.  No associated diaphoresis or shortness of breath.  Reports compliance with his medications.  Symptoms are moderate in nature.  No exacerbating or relieving factors. He is scheduled to see Dr. Vernon at [facility] for brachytherapy of his CAD later this month.    Rest of the 10 point review of systems is negative except as mentioned above.      Procedure(s) (LRB):  Left heart cath (Left)      Hospital Course:      HPI: Patient is a 71-year-old  male with known CAD, recent angioplasty in April 2022, CKD stage 3 and type 2 diabetes mellitus presents to the ED with chief complaint of chest pain.     He was hospitalized here last week for decompensated systolic congestive heart failure and NSTEMI. He was treated medically with heparin drip.  He was initiated on p.r.n. furosemide with improvement in his volume overload.     He presents today with acute episode of substernal chest discomfort which started after gardening.  It is burning in nature with radiation  to his midline neck.  No associated diaphoresis or shortness of breath.  Reports compliance with his medications.  Symptoms are moderate in nature.  No exacerbating or relieving factors. He is scheduled to see Dr. Vernon at [facility] for brachytherapy of his CAD later this month.     Rest of the 10 point review of systems is negative except as mentioned above.    Hospital course  Patient with known CAD, recent angioplasty in April ,chronic kidney disease was admitted with non STEMI.  He already had communicated with Dr. Vernon off for high risk PCI in the past and he has appointment.  He came in complaining of chest pain  and later  troponin level was as high as 24 but patient chest pain resolved later and troponin was trending down.  Because of patient is asymptomatic later and decision was made to discharge home with p.r.n. Lasix and hydralazine and  patient walked in the cardiology unit without any problem and  discharged stable condition to follow with Dr. Vernon for brachytherapy next week          Goals of Care Treatment Preferences:  Code Status: Full Code      Consults:   Consults (From admission, onward)        Status Ordering Provider     Inpatient consult to Cardiology  Once        Provider:  Dave Vernon MD    Completed MYLA GRIFFITH          No new Assessment & Plan notes have been filed under this hospital service since the last note was generated.  Service: Hospital Medicine    Final Active Diagnoses:    Diagnosis Date Noted POA    PRINCIPAL PROBLEM:  NSTEMI (non-ST elevated myocardial infarction) [I21.4] 11/27/2021 Yes    Acute renal failure superimposed on stage 3a chronic kidney disease [N17.9, N18.31] 04/18/2022 Yes    Hypertensive emergency [I16.1] 11/27/2021 Yes    Type 2 diabetes mellitus with stage 3b chronic kidney disease, without long-term current use of insulin [E11.22, N18.32] 12/03/2013 Yes    Coronary artery disease [I25.10]  Yes      Problems Resolved During this Admission:        Discharged Condition: good    Disposition: Home or Self Care    Follow Up:   Follow-up Information     Angelito Adams MD Follow up in 1 month(s).    Specialty: Family Medicine  Contact information:  [address]  [place] [address] [phone]             Dave Vernon MD Follow up in 1 month(s).    Specialties: Interventional Cardiology, Cardiology  Contact information:  [address] [facility]  [address]  [place] [address] [phone]             dr mae for brachytherapy Follow up.    Contact information:  pt has appointment                     Patient Instructions:      Comprehensive metabolic panel   Standing Status: Future Standing Exp. Date: 08/14/23     CBC auto differential   Standing Status: Future Standing Exp. Date: 08/14/23     Urinalysis, Reflex to Urine Culture Urine, Clean Catch   Standing Status: Future Standing Exp. Date: 08/14/23     Order Specific Question Answer Comments   Preferred Collection Type Urine, Clean Catch    Specimen Source Urine      APTT   Standing Status: Future Standing Exp. Date: 08/14/23     Basic metabolic panel   Standing Status: Future Standing Exp. Date: 08/16/23     Diet Cardiac     Activity as tolerated       Significant Diagnostic Studies: Labs:   CMP   Recent Labs   Lab 06/17/22  0429      K 3.9      CO2 24   *   BUN 47*   CREATININE 2.3*   CALCIUM 9.0   ANIONGAP 8   ESTGFRAFRICA 31.4*   EGFRNONAA 27.2*    and CBC   Recent Labs   Lab 06/17/22 0429   WBC 7.90   HGB 11.0*   HCT 32.5*          Pending Diagnostic Studies:     None         Medications:  Reconciled Home Medications:      Medication List      CHANGE how you take these medications    furosemide 20 MG tablet  Commonly known as: LASIX  Take 1 tablet (20 mg total) by mouth 3 (three) times a week.  What changed:   · when to take this  · reasons to take this     hydrALAZINE 50 MG tablet  Commonly known as: APRESOLINE  Take 1 tablet (50 mg total) by mouth every 8 (eight)  hours.  What changed:   · medication strength  · how much to take        CONTINUE taking these medications    alcohol swabs Padm  Commonly known as: BD ALCOHOL SWABS  Apply 1 each topically as needed.     aspirin 81 MG EC tablet  Commonly known as: ECOTRIN  Take 1 tablet (81 mg total) by mouth once daily.     atorvastatin 80 MG tablet  Commonly known as: LIPITOR  Take 1 tablet (80 mg total) by mouth every evening.     * blood sugar diagnostic Strp  Commonly known as: ACCU-CHEK OLGA PLUS TEST STRP  1 each by Misc.(Non-Drug; Combo Route) route daily as needed.     * blood sugar diagnostic Strp  To check BG 2 times daily, to use with insurance preferred meter     BRILINTA 90 mg tablet  Generic drug: ticagrelor  Take 1 tablet (90 mg total) by mouth 2 (two) times a day.     carvediloL 12.5 MG tablet  Commonly known as: COREG  Take 1 tablet (12.5 mg total) by mouth 2 (two) times daily with meals.     CENTRUM SILVER ORAL  Take 1 capsule by mouth once daily.     glipiZIDE 10 MG tablet  Commonly known as: GLUCOTROL  Take 1 tablet (10 mg total) by mouth 2 (two) times daily with meals.     isosorbide dinitrate 20 MG tablet  Commonly known as: ISORDIL  Take 2 tablets (40 mg total) by mouth 3 (three) times daily.     lancets Misc  To check BG 2 times daily, to use with insurance preferred meter     metFORMIN 1000 MG tablet  Commonly known as: GLUCOPHAGE  Take 1 tablet (1,000 mg total) by mouth 2 (two) times daily with meals.     SITagliptin 100 MG Tab  Commonly known as: JANUVIA  Take 1 tablet (100 mg total) by mouth once daily.         * This list has 2 medication(s) that are the same as other medications prescribed for you. Read the directions carefully, and ask your doctor or other care provider to review them with you.            STOP taking these medications    clopidogreL 75 mg tablet  Commonly known as: PLAVIX            Indwelling Lines/Drains at time of discharge:   Lines/Drains/Airways     None             General:  Patient resting comfortably in no acute distress. Appears as stated age. Calm  Eyes: EOM intact. No conjunctivae injection. No scleral icterus.  ENT: Hearing grossly intact. No discharge from ears. No nasal discharge.   CVS: RRR. No LE edema BL.  Lungs: CTA BL, no wheezing or crackles. Good breath sounds. No accessory muscle use. No acute respiratory distress  Neuro: non focal , Follows commands. Responds appropriately    Time spent on the discharge of patient: 44 minutes         Amor Orozco MD  Department of Hospital Medicine  [facility]

## 2022-06-22 LAB
ALBUMIN SERPL-MCNC: 4.5 G/DL (ref 3.7–4.7)
ALBUMIN/GLOB SERPL: 2 {RATIO} (ref 1.2–2.2)
ALP SERPL-CCNC: 89 IU/L (ref 44–121)
ALT SERPL-CCNC: 48 IU/L (ref 0–44)
AST SERPL-CCNC: 35 IU/L (ref 0–40)
BASOPHILS # BLD AUTO: 0.1 X10E3/UL (ref 0–0.2)
BASOPHILS NFR BLD AUTO: 1 %
BILIRUB SERPL-MCNC: 0.5 MG/DL (ref 0–1.2)
BUN SERPL-MCNC: 58 MG/DL (ref 8–27)
BUN/CREAT SERPL: 25 (ref 10–24)
CALCIUM SERPL-MCNC: 9.1 MG/DL (ref 8.6–10.2)
CHLORIDE SERPL-SCNC: 103 MMOL/L (ref 96–106)
CO2 SERPL-SCNC: 19 MMOL/L (ref 20–29)
CREAT SERPL-MCNC: 2.29 MG/DL (ref 0.76–1.27)
EOSINOPHIL # BLD AUTO: 0.2 X10E3/UL (ref 0–0.4)
EOSINOPHIL NFR BLD AUTO: 3 %
ERYTHROCYTE [DISTWIDTH] IN BLOOD BY AUTOMATED COUNT: 14.4 % (ref 11.6–15.4)
EST. GFR  (NO RACE VARIABLE): 29 ML/MIN/1.73
GLOBULIN SER CALC-MCNC: 2.3 G/DL (ref 1.5–4.5)
GLUCOSE SERPL-MCNC: 263 MG/DL (ref 65–99)
HCT VFR BLD AUTO: 33.5 % (ref 37.5–51)
HGB BLD-MCNC: 10.8 G/DL (ref 13–17.7)
IMM GRANULOCYTES # BLD AUTO: 0 X10E3/UL (ref 0–0.1)
IMM GRANULOCYTES NFR BLD AUTO: 0 %
LYMPHOCYTES # BLD AUTO: 0.9 X10E3/UL (ref 0.7–3.1)
LYMPHOCYTES NFR BLD AUTO: 12 %
MCH RBC QN AUTO: 30 PG (ref 26.6–33)
MCHC RBC AUTO-ENTMCNC: 32.2 G/DL (ref 31.5–35.7)
MCV RBC AUTO: 93 FL (ref 79–97)
MONOCYTES # BLD AUTO: 0.5 X10E3/UL (ref 0.1–0.9)
MONOCYTES NFR BLD AUTO: 6 %
NEUTROPHILS # BLD AUTO: 5.9 X10E3/UL (ref 1.4–7)
NEUTROPHILS NFR BLD AUTO: 78 %
PLATELET # BLD AUTO: 171 X10E3/UL (ref 150–450)
POTASSIUM SERPL-SCNC: 4.7 MMOL/L (ref 3.5–5.2)
PROT SERPL-MCNC: 6.8 G/DL (ref 6–8.5)
RBC # BLD AUTO: 3.6 X10E6/UL (ref 4.14–5.8)
SODIUM SERPL-SCNC: 136 MMOL/L (ref 134–144)
SPECIMEN STATUS REPORT: NORMAL
WBC # BLD AUTO: 7.5 X10E3/UL (ref 3.4–10.8)

## 2022-06-24 ENCOUNTER — HOSPITAL ENCOUNTER (OUTPATIENT)
Facility: HOSPITAL | Age: 72
Discharge: HOME | End: 2022-06-24
Attending: INTERNAL MEDICINE | Admitting: INTERNAL MEDICINE
Payer: MEDICARE

## 2022-06-24 VITALS
TEMPERATURE: 98 F | HEIGHT: 69 IN | BODY MASS INDEX: 28.14 KG/M2 | OXYGEN SATURATION: 100 % | DIASTOLIC BLOOD PRESSURE: 64 MMHG | RESPIRATION RATE: 18 BRPM | WEIGHT: 190 LBS | HEART RATE: 78 BPM | SYSTOLIC BLOOD PRESSURE: 135 MMHG

## 2022-06-24 DIAGNOSIS — I25.10 CORONARY ARTERY DISEASE INVOLVING NATIVE CORONARY ARTERY OF NATIVE HEART WITHOUT ANGINA PECTORIS: ICD-10-CM

## 2022-06-24 DIAGNOSIS — I25.10 CAD (CORONARY ARTERY DISEASE): ICD-10-CM

## 2022-06-24 DIAGNOSIS — Z98.61 POSTSURGICAL PERCUTANEOUS TRANSLUMINAL CORONARY ANGIOPLASTY STATUS: Primary | ICD-10-CM

## 2022-06-24 DIAGNOSIS — I25.10 ATHEROSCLEROSIS OF NATIVE CORONARY ARTERY OF NATIVE HEART WITHOUT ANGINA PECTORIS: ICD-10-CM

## 2022-06-24 DIAGNOSIS — I25.119 CORONARY ARTERY DISEASE INVOLVING NATIVE HEART WITH ANGINA PECTORIS, UNSPECIFIED VESSEL OR LESION TYPE: ICD-10-CM

## 2022-06-24 LAB
ABO + RH BLD: NORMAL
ANION GAP SERPL CALC-SCNC: 11 MMOL/L (ref 8–16)
BASOPHILS # BLD AUTO: 0.08 K/UL (ref 0–0.2)
BASOPHILS NFR BLD: 0.9 % (ref 0–1.9)
BLD GP AB SCN CELLS X3 SERPL QL: NORMAL
BUN SERPL-MCNC: 50 MG/DL (ref 8–23)
CALCIUM SERPL-MCNC: 9.6 MG/DL (ref 8.7–10.5)
CHLORIDE SERPL-SCNC: 109 MMOL/L (ref 95–110)
CO2 SERPL-SCNC: 19 MMOL/L (ref 23–29)
CREAT SERPL-MCNC: 2 MG/DL (ref 0.5–1.4)
DIFFERENTIAL METHOD: ABNORMAL
EOSINOPHIL # BLD AUTO: 0.2 K/UL (ref 0–0.5)
EOSINOPHIL NFR BLD: 2.3 % (ref 0–8)
ERYTHROCYTE [DISTWIDTH] IN BLOOD BY AUTOMATED COUNT: 14.3 % (ref 11.5–14.5)
EST. GFR  (AFRICAN AMERICAN): 37.2 ML/MIN/1.73 M^2
EST. GFR  (NON AFRICAN AMERICAN): 32.2 ML/MIN/1.73 M^2
GLUCOSE SERPL-MCNC: 178 MG/DL (ref 70–110)
HCT VFR BLD AUTO: 33.5 % (ref 40–54)
HGB BLD-MCNC: 11.2 G/DL (ref 14–18)
IMM GRANULOCYTES # BLD AUTO: 0.03 K/UL (ref 0–0.04)
IMM GRANULOCYTES NFR BLD AUTO: 0.3 % (ref 0–0.5)
LYMPHOCYTES # BLD AUTO: 1.1 K/UL (ref 1–4.8)
LYMPHOCYTES NFR BLD: 11.2 % (ref 18–48)
MCH RBC QN AUTO: 30.8 PG (ref 27–31)
MCHC RBC AUTO-ENTMCNC: 33.4 G/DL (ref 32–36)
MCV RBC AUTO: 92 FL (ref 82–98)
MONOCYTES # BLD AUTO: 0.7 K/UL (ref 0.3–1)
MONOCYTES NFR BLD: 7.2 % (ref 4–15)
NEUTROPHILS # BLD AUTO: 7.3 K/UL (ref 1.8–7.7)
NEUTROPHILS NFR BLD: 78.1 % (ref 38–73)
NRBC BLD-RTO: 0 /100 WBC
PLATELET # BLD AUTO: 172 K/UL (ref 150–450)
PMV BLD AUTO: 10.8 FL (ref 9.2–12.9)
POCT GLUCOSE: 192 MG/DL (ref 70–110)
POTASSIUM SERPL-SCNC: 4.5 MMOL/L (ref 3.5–5.1)
RBC # BLD AUTO: 3.64 M/UL (ref 4.6–6.2)
SODIUM SERPL-SCNC: 139 MMOL/L (ref 136–145)
WBC # BLD AUTO: 9.37 K/UL (ref 3.9–12.7)

## 2022-06-24 PROCEDURE — 92979 ENDOLUMINL IVUS OCT C EA: CPT | Performed by: INTERNAL MEDICINE

## 2022-06-24 PROCEDURE — C1753 CATH, INTRAVAS ULTRASOUND: HCPCS | Performed by: INTERNAL MEDICINE

## 2022-06-24 PROCEDURE — C1725 CATH, TRANSLUMIN NON-LASER: HCPCS | Performed by: INTERNAL MEDICINE

## 2022-06-24 PROCEDURE — 92921 PR PTCA, ADD'L VESSEL: ICD-10-PCS | Mod: ,,, | Performed by: INTERNAL MEDICINE

## 2022-06-24 PROCEDURE — 92921 PR PTCA, ADD'L VESSEL: CPT | Mod: ,,, | Performed by: INTERNAL MEDICINE

## 2022-06-24 PROCEDURE — 93458 PR CATH PLACE/CORON ANGIO, IMG SUPER/INTERP,W LEFT HEART VENTRICULOGRAPHY: ICD-10-PCS | Mod: 26,59,51, | Performed by: INTERNAL MEDICINE

## 2022-06-24 PROCEDURE — 86901 BLOOD TYPING SEROLOGIC RH(D): CPT | Performed by: INTERNAL MEDICINE

## 2022-06-24 PROCEDURE — 93010 ELECTROCARDIOGRAM REPORT: CPT | Mod: ,,, | Performed by: INTERNAL MEDICINE

## 2022-06-24 PROCEDURE — 93458 L HRT ARTERY/VENTRICLE ANGIO: CPT | Performed by: INTERNAL MEDICINE

## 2022-06-24 PROCEDURE — 92920 PRQ TRLUML C ANGIOP 1ART&/BR: CPT | Performed by: INTERNAL MEDICINE

## 2022-06-24 PROCEDURE — C1887 CATHETER, GUIDING: HCPCS | Performed by: INTERNAL MEDICINE

## 2022-06-24 PROCEDURE — 93010 EKG 12-LEAD: ICD-10-PCS | Mod: ,,, | Performed by: INTERNAL MEDICINE

## 2022-06-24 PROCEDURE — C1769 GUIDE WIRE: HCPCS | Performed by: INTERNAL MEDICINE

## 2022-06-24 PROCEDURE — C1728 CATH, BRACHYTX SEED ADM: HCPCS | Performed by: INTERNAL MEDICINE

## 2022-06-24 PROCEDURE — 92920 PR PTCA: ICD-10-PCS | Mod: RC,,, | Performed by: INTERNAL MEDICINE

## 2022-06-24 PROCEDURE — 92978 ENDOLUMINL IVUS OCT C 1ST: CPT | Mod: 26,,, | Performed by: INTERNAL MEDICINE

## 2022-06-24 PROCEDURE — 93458 L HRT ARTERY/VENTRICLE ANGIO: CPT | Mod: 26,59,51, | Performed by: INTERNAL MEDICINE

## 2022-06-24 PROCEDURE — C1760 CLOSURE DEV, VASC: HCPCS | Performed by: INTERNAL MEDICINE

## 2022-06-24 PROCEDURE — 92979 PR INTRAVASC CORONARY SO2,ADDN VESSEL: ICD-10-PCS | Mod: 26,,, | Performed by: INTERNAL MEDICINE

## 2022-06-24 PROCEDURE — 92921 HC PTCA , ADD'L BRANCH: CPT | Performed by: INTERNAL MEDICINE

## 2022-06-24 PROCEDURE — 85025 COMPLETE CBC W/AUTO DIFF WBC: CPT | Performed by: INTERNAL MEDICINE

## 2022-06-24 PROCEDURE — 92974 CATH PLACE CARDIO BRACHYTX: CPT | Performed by: INTERNAL MEDICINE

## 2022-06-24 PROCEDURE — 63600175 PHARM REV CODE 636 W HCPCS: Performed by: INTERNAL MEDICINE

## 2022-06-24 PROCEDURE — 92974 PR TRANSCATH PLACMT,RAD DELIV DEV,CORONARY: ICD-10-PCS | Mod: ,,, | Performed by: INTERNAL MEDICINE

## 2022-06-24 PROCEDURE — 99152 MOD SED SAME PHYS/QHP 5/>YRS: CPT | Mod: ,,, | Performed by: INTERNAL MEDICINE

## 2022-06-24 PROCEDURE — 25500020 PHARM REV CODE 255: Performed by: INTERNAL MEDICINE

## 2022-06-24 PROCEDURE — 92920 PRQ TRLUML C ANGIOP 1ART&/BR: CPT | Mod: RC,,, | Performed by: INTERNAL MEDICINE

## 2022-06-24 PROCEDURE — 25000003 PHARM REV CODE 250: Performed by: INTERNAL MEDICINE

## 2022-06-24 PROCEDURE — 99152 PR MOD CONSCIOUS SEDATION, SAME PHYS, 5+ YRS, FIRST 15 MIN: ICD-10-PCS | Mod: ,,, | Performed by: INTERNAL MEDICINE

## 2022-06-24 PROCEDURE — C1894 INTRO/SHEATH, NON-LASER: HCPCS | Performed by: INTERNAL MEDICINE

## 2022-06-24 PROCEDURE — 92974 CATH PLACE CARDIO BRACHYTX: CPT | Mod: ,,, | Performed by: INTERNAL MEDICINE

## 2022-06-24 PROCEDURE — 93005 ELECTROCARDIOGRAM TRACING: CPT | Mod: 59

## 2022-06-24 PROCEDURE — 92979 ENDOLUMINL IVUS OCT C EA: CPT | Mod: 26,,, | Performed by: INTERNAL MEDICINE

## 2022-06-24 PROCEDURE — 92978 ENDOLUMINL IVUS OCT C 1ST: CPT | Performed by: INTERNAL MEDICINE

## 2022-06-24 PROCEDURE — 86900 BLOOD TYPING SEROLOGIC ABO: CPT | Performed by: INTERNAL MEDICINE

## 2022-06-24 PROCEDURE — 80048 BASIC METABOLIC PNL TOTAL CA: CPT | Performed by: INTERNAL MEDICINE

## 2022-06-24 PROCEDURE — 27201423 OPTIME MED/SURG SUP & DEVICES STERILE SUPPLY: Performed by: INTERNAL MEDICINE

## 2022-06-24 PROCEDURE — 99153 MOD SED SAME PHYS/QHP EA: CPT | Performed by: INTERNAL MEDICINE

## 2022-06-24 PROCEDURE — 92978 PR IVUS, CORONARY, 1ST VESSEL: ICD-10-PCS | Mod: 26,,, | Performed by: INTERNAL MEDICINE

## 2022-06-24 PROCEDURE — 99152 MOD SED SAME PHYS/QHP 5/>YRS: CPT | Performed by: INTERNAL MEDICINE

## 2022-06-24 DEVICE — ANGIO-SEAL VIP VASCULAR CLOSURE DEVICE
Type: IMPLANTABLE DEVICE | Site: GROIN | Status: FUNCTIONAL
Brand: ANGIO-SEAL

## 2022-06-24 RX ORDER — HEPARIN SODIUM 1000 [USP'U]/ML
INJECTION, SOLUTION INTRAVENOUS; SUBCUTANEOUS
Status: DISCONTINUED | OUTPATIENT
Start: 2022-06-24 | End: 2022-06-24 | Stop reason: HOSPADM

## 2022-06-24 RX ORDER — MIDAZOLAM HYDROCHLORIDE 1 MG/ML
INJECTION, SOLUTION INTRAMUSCULAR; INTRAVENOUS
Status: DISCONTINUED | OUTPATIENT
Start: 2022-06-24 | End: 2022-06-24 | Stop reason: HOSPADM

## 2022-06-24 RX ORDER — DIPHENHYDRAMINE HCL 50 MG
50 CAPSULE ORAL ONCE
Status: COMPLETED | OUTPATIENT
Start: 2022-06-24 | End: 2022-06-24

## 2022-06-24 RX ORDER — CEFAZOLIN SODIUM 1 G/3ML
INJECTION, POWDER, FOR SOLUTION INTRAMUSCULAR; INTRAVENOUS
Status: DISCONTINUED | OUTPATIENT
Start: 2022-06-24 | End: 2022-06-24 | Stop reason: HOSPADM

## 2022-06-24 RX ORDER — NITROGLYCERIN 0.3 MG/1
0.3 TABLET SUBLINGUAL EVERY 5 MIN PRN
Qty: 100 TABLET | Refills: 1 | Status: SHIPPED | OUTPATIENT
Start: 2022-06-24 | End: 2023-06-24

## 2022-06-24 RX ORDER — ONDANSETRON 8 MG/1
8 TABLET, ORALLY DISINTEGRATING ORAL EVERY 8 HOURS PRN
Status: DISCONTINUED | OUTPATIENT
Start: 2022-06-24 | End: 2022-06-24 | Stop reason: HOSPADM

## 2022-06-24 RX ORDER — FENTANYL CITRATE 50 UG/ML
INJECTION, SOLUTION INTRAMUSCULAR; INTRAVENOUS
Status: DISCONTINUED | OUTPATIENT
Start: 2022-06-24 | End: 2022-06-24 | Stop reason: HOSPADM

## 2022-06-24 RX ORDER — SODIUM CHLORIDE 9 MG/ML
INJECTION, SOLUTION INTRAVENOUS CONTINUOUS
Status: ACTIVE | OUTPATIENT
Start: 2022-06-24 | End: 2022-06-24

## 2022-06-24 RX ORDER — ISOSORBIDE DINITRATE 20 MG/1
40 TABLET ORAL 3 TIMES DAILY
Qty: 180 TABLET | Refills: 1 | Status: SHIPPED | OUTPATIENT
Start: 2022-06-24 | End: 2022-07-06 | Stop reason: SDUPTHER

## 2022-06-24 RX ADMIN — DIPHENHYDRAMINE HYDROCHLORIDE 50 MG: 50 CAPSULE ORAL at 10:06

## 2022-06-24 RX ADMIN — SODIUM CHLORIDE: 0.9 INJECTION, SOLUTION INTRAVENOUS at 10:06

## 2022-06-24 NOTE — H&P
[facility]                                              Interventional cardiology                                                       H@P      HPI  Mr. Childs is a 71-year-old male with history of hypertension, diabetes (A1c 9.2) , past DVT in LLE several years ago (previosuly on Apxiban), hyperlipidemia, coronary artery disease status post PCI 17 years ago who presents for evaluation for brachytherapy.  The patient underwent RCA PCI on 11/30/21 after which he was asymptomatic with regard to angina.  On Easter weekend 2022 he developed recurrent angina and presented to [facility] with an NSTEMI.  He was found to have in-stent-restenosis of his distal RCA/PDA/PLB stents.  These were treated with POBA with the plan for the patient to receive brachytherapy. He was later admitted to hospital twice for chest pain . Today the patient denies recurrent angina since his angioplasty.  He denies lower extremity edema, orthopnea or PND.  He denies palpitations, syncope or near syncope. His creatinine is 2.3 from 3 days back. He was evaluated by radiation oncology in may           Past Medical History:   Diagnosis Date    Coronary artery disease      Diabetes mellitus type II      Hypertension              Past Surgical History:   Procedure Laterality Date    ANGIOGRAM, CORONARY, WITH LEFT HEART CATHETERIZATION N/A 11/27/2021     Procedure: Angiogram, Coronary, with Left Heart Cath;  Surgeon: Dave Vernon MD;  Location: [facility] CATH/EP LAB;  Service: Cardiology;  Laterality: N/A;    ANGIOGRAM, CORONARY, WITH LEFT HEART CATHETERIZATION N/A 4/18/2022     Procedure: Angiogram, Coronary, with Left Heart Cath;  Surgeon: Dave Vernon MD;  Location: [facility] CATH/EP LAB;  Service: Cardiology;  Laterality: N/A;    APPENDECTOMY        CORONARY ANGIOGRAPHY Left 11/30/2021     Procedure: ANGIOGRAM, CORONARY ARTERY;  Surgeon: Gunnar Vernon MD;  Location: [facility] CATH LAB;  Service:  Cardiology;  Laterality: Left;    HERNIA REPAIR        LEFT HEART CATHETERIZATION Left 4/20/2022     Procedure: Left heart cath;  Surgeon: Dave Vernon MD;  Location: [facility] CATH/EP LAB;  Service: Cardiology;  Laterality: Left;    PERCUTANEOUS TRANSLUMINAL ANGIOPLASTY N/A 4/20/2022     Procedure: PTA (ANGIOPLASTY, PERCUTANEOUS, TRANSLUMINAL);  Surgeon: Dave Vernon MD;  Location: [facility] CATH/EP LAB;  Service: Cardiology;  Laterality: N/A;    stents                 Current Outpatient Medications on File Prior to Visit   Medication Sig Dispense Refill    aspirin (ECOTRIN) 81 MG EC tablet Take 1 tablet (81 mg total) by mouth once daily. 30 tablet 11    atorvastatin (LIPITOR) 80 MG tablet Take 1 tablet (80 mg total) by mouth every evening. 90 tablet 0    carvediloL (COREG) 12.5 MG tablet Take 1 tablet (12.5 mg total) by mouth 2 (two) times daily with meals. 180 tablet 3    glipiZIDE (GLUCOTROL) 10 MG tablet Take 1 tablet (10 mg total) by mouth 2 (two) times daily with meals. 180 tablet 1    hydrALAZINE (APRESOLINE) 25 MG tablet Take 1 tablet (25 mg total) by mouth every 8 (eight) hours. 90 tablet 1    isosorbide dinitrate (ISORDIL) 20 MG tablet Take 2 tablets (40 mg total) by mouth 3 (three) times daily. 180 tablet 1    metFORMIN (GLUCOPHAGE) 1000 MG tablet Take 1 tablet (1,000 mg total) by mouth 2 (two) times daily with meals. Please hold medication for 2 more days. Can restart on 12/3/21 180 tablet 1    MULTIVITAMIN W-MINERALS/LUTEIN (CENTRUM SILVER ORAL) Take 1 capsule by mouth once daily.        SITagliptin (JANUVIA) 100 MG Tab Take 1 tablet (100 mg total) by mouth once daily. 30 tablet 11    ticagrelor (BRILINTA) 90 mg tablet Take 1 tablet (90 mg total) by mouth 2 (two) times a day. 60 tablet 1    alcohol swabs (BD ALCOHOL SWABS) PadM Apply 1 each topically as needed. 100 each 6    amLODIPine (NORVASC) 5 MG tablet Take 1 tablet (5 mg total) by mouth once daily. 90 tablet 0    blood sugar diagnostic  (ACCU-CHEK OLGA PLUS TEST STRP) Strp 1 each by Misc.(Non-Drug; Combo Route) route daily as needed. 100 each 6    blood sugar diagnostic Strp To check BG 2 times daily, to use with insurance preferred meter 200 strip 2    chlorthalidone (HYGROTEN) 25 MG Tab Take 0.5 tablets (12.5 mg total) by mouth once daily. 45 tablet 0    lancets Misc To check BG 2 times daily, to use with insurance preferred meter 100 each 0    valsartan (DIOVAN) 80 MG tablet Take 80 mg by mouth once daily.        [DISCONTINUED] clopidogreL (PLAVIX) 75 mg tablet Take 1 tablet (75 mg total) by mouth once daily. 90 tablet 1      No current facility-administered medications on file prior to visit.           Review of patient's allergies indicates:   Allergen Reactions    Morphine Nausea And Vomiting    Vicodin [hydrocodone-acetaminophen] Nausea And Vomiting      Social History            Tobacco Use    Smoking status: Never Smoker    Smokeless tobacco: Never Used   Substance Use Topics    Alcohol use: Not Currently       Comment: 2 a year    Drug use: No            Family History   Problem Relation Age of Onset    Diabetes Mother      Cancer Neg Hx      Macular degeneration Neg Hx      Retinal detachment Neg Hx      Glaucoma Neg Hx                 Review of Systems   Constitutional: Negative for decreased appetite, diaphoresis, fever, malaise/fatigue, weight gain and weight loss.   HENT: Negative for congestion, nosebleeds and sore throat.    Eyes: Negative for blurred vision, vision loss in left eye, vision loss in right eye and visual disturbance.   Cardiovascular: Negative for chest pain, claudication, dyspnea on exertion, leg swelling, near-syncope, orthopnea, palpitations, paroxysmal nocturnal dyspnea and syncope.   Respiratory: Negative for cough, hemoptysis, shortness of breath and wheezing.    Endocrine: Negative for polyuria.   Hematologic/Lymphatic: Does not bruise/bleed easily.   Skin: Negative for nail changes and rash.    Musculoskeletal: Negative for back pain, muscle cramps and myalgias.   Gastrointestinal: Negative for abdominal pain, change in bowel habit, diarrhea, heartburn, hematemesis, hematochezia, melena, nausea and vomiting.   Genitourinary: Negative for bladder incontinence, dysuria, frequency and hematuria.   Psychiatric/Behavioral: Negative for depression.   Allergic/Immunologic: Negative for hives.      There were no vitals filed for this visit.    Constitutional:       Appearance: Normal appearance. He is well-developed.   HENT:      Head: Normocephalic and atraumatic.   Eyes:      Pupils: Pupils are equal, round, and reactive to light.   Neck:      Thyroid: No thyromegaly.      Vascular: No JVD.   Cardiovascular:      Rate and Rhythm: Normal rate and regular rhythm.      Chest Wall: PMI is displaced.      Pulses:           Carotid pulses are 2+ on the right side and 2+ on the left side.       Radial pulses are 2+ on the right side and 2+ on the left side.        Femoral pulses are 2+ on the right side and 2+ on the left side.       Dorsalis pedis pulses are 2+ on the right side and 2+ on the left side.        Posterior tibial pulses are 2+ on the right side and 2+ on the left side.      Heart sounds: No murmur heard.    No friction rub. No gallop.      Comments: Normal right radial Isai's test.  Pulmonary:      Effort: Pulmonary effort is normal.      Breath sounds: No wheezing, rhonchi or rales.   Abdominal:      General: There is no distension.      Palpations: Abdomen is soft.      Tenderness: There is no abdominal tenderness.   Musculoskeletal:      Cervical back: Neck supple.   Neurological:      Mental Status: He is alert and oriented to person, place, and time.      Gait: Gait normal.           Assessment:       1. Coronary artery disease involving native heart with angina pectoris, unspecified vessel or lesion type    2. Bilateral carotid bruits    3. Hyperlipidemia associated with type 2 diabetes mellitus     4. Hypertension associated with diabetes    5. CKD stage 3 due to type 2 diabetes mellitus    6. Anemia, unspecified type    7. Obesity (BMI 30.0-34.9)    8. Type 2 diabetes mellitus with stage 3b chronic kidney disease, without long-term current use of insulin    9. Thrombocytopenia       Plan:       1) CAD.  The patient presented with an NSTEMI on Easter weekend and was found to have InStent restenosis of his distal RCA bifurcation stents.  He underwent balloon angioplasty at that time with an excellent result.  He presents to clinic today for evaluation of possible brachytherapy to that site.  I discussed brachytherapy with the patient in detail he would like to proceed.  -8 Bengali right CFA access  -The risks, benefits, and alternatives of cardiac catheterization and possible intervention were discussed with the patient.  All questions were answered and informed consent was obtained.  -continue enteric-coated aspirin 81 mg p.o. q.day  -continue ticagrelor 90 mg p.o. b.i.d.  -continue Coreg 12.5 mg p.o. b.i.d.     2) hypertension.  The patient's blood pressure is markedly elevated in clinic today; will reassessed post catheterization  -continue Coreg 12.5 mg p.o. b.i.d.  -continue hydralazine 25 mg p.o. t.i.d.  -continue isosorbide dinitrate 20 mg p.o. b.i.d.  -continue valsartan 80 mg p.o. q.day     3) dyslipidemia.  04/01/2022 lipid profile reviewed  -continue Lipitor 80 mg p.o. q.day     4) CKD 3A.  The patient's creatinine on  6/21/2022 was 2.3;  -check creatinine prior to cardiac catheterization  -recommend oral hydration the day prior to cardiac catheterization and will provide IV hydration the day of the procedure.     5) type 2 diabetes.  The patient's hemoglobin A1c on 04/01/2022 was 7.4 his hemoglobin A1c on 11/27/2021 was 9.2;   Recommend tight glycemic control as uncontrolled diabetes is a risk factor for InStent restenosis     6) anemia.  Patient's hemoglobin on 04/19/2022 was 10.7 with an MCV of  89.  -check CBC prior to cardiac catheterization      7) thrombocytopenia.  The patient's platelet count on 04/19/2022 was 101; he has no active bleeding diathesis of which he is aware.  -check platelet count prior to cardiac catheterization

## 2022-06-24 NOTE — Clinical Note
The catheter was inserted into the RPLB artery. IVUS was performed of RPLB and RCA and catheter was removed.

## 2022-06-24 NOTE — PLAN OF CARE
Patient arrived to unit, oriented to environment, time allowed for questions, emotional support provided, IV x 2 started, labs drawn and sent, Covid test at bedside was negative.  Instructed to call for assessment, will continue to monitor.

## 2022-06-24 NOTE — Clinical Note
CATH BLLN FG APEX MR 3.40D23II- Balloon was inserted and inflated at 12 oscar for 10 seconds, balloon was used as a trap balloon. And balloon was removed.

## 2022-06-24 NOTE — PLAN OF CARE
Received report from RUTHY Bates. Patient s/p [facility] [place], AAOx3. VSS, no c/o pain or discomfort at this time, resp even and unlabored. Gauze/tegaderm dressing to R groin is CDI. No active bleeding. No hematoma noted. Post procedure protocol reviewed with patient and patient's family. Understanding verbalized. Family members at bedside. Nurse call bell within reach. Will continue to monitor per post procedure protocol.

## 2022-06-24 NOTE — Clinical Note
The catheter was inserted into the distal right RCA to ostial RPLB.  And brachytherapy was performed.   23 Gy for 5 min 39 secs.  And catheter was removed.

## 2022-06-24 NOTE — OP NOTE
Brief Operative Note:    : Gunnar Vernon MD     Referring Physician: Gunnar Vernon     All Operators: Surgeon(s):  MD Elvis Lynne MD     Preoperative Diagnosis: Coronary artery disease involving native coronary artery of native heart without angina pectoris [I25.10]     Postop Diagnosis: Coronary artery disease involving native coronary artery of native heart without angina pectoris [I25.10]    Treatments/Procedures: Procedure(s) (LRB):  PTCA, BRACHYTHERAPY, CORONARY (N/A)  IVUS, Coronary  Left heart cath (Left)    Access: Right CFA    Findings:Severe coronary artery disease is present.     See catheterization report for full details.    Intervention: Balloon dilation of  PLB and PDA followed by Brachytherapy     See catheterization report for full details.    Closure device: Angioseal       Plan:  - Post cath protocol   - IVF @ 200 cc/hr x 4 hours  - Bed rest x 4hours   - Continue aspirin 81 mg daily indefinitely  - Continue Ticagrelor for 1 year  - Continue high intensity statin therapy (LDL goal < 70)  - Risk factor reduction (BP <130/80 mmHg, glycemic control, etc)  - Cardiac rehab referral  - Follow up with outpatient cardiologist    Estimated Blood loss: 20 cc    Specimens removed: No    Elvis Maya MD  [facility]  Cardiovascular Disease, PGY-IV  6/24/22

## 2022-06-27 LAB
POC ACTIVATED CLOTTING TIME K: 202 SEC (ref 74–137)
SAMPLE: ABNORMAL

## 2022-06-28 NOTE — DISCHARGE SUMMARY
Discharge Summary  Interventional Cardiology      Admit Date: 6/24/2022    Discharge Date:  6/28/2022    Attending Physician: Gunnar Vernon     Discharge Physician: : Gunnar Vernon    Principal Diagnoses: Coronary artery Disease  Indication for Admission: PTCA, BRACHYTHERAPY, CORONARY (N/A), IVUS, Coronary, Left heart cath (Left)    Discharged Condition: Good    Hospital Course:   Patient presented for outpatient coronary angiogram which went without complication. Balloon dilation of  PLB and PDA followed by Brachytherapy . See full cath report in Epic for details. Hemostasis of patient's R CFA access site was achieved with Angio-Seal closure device. Patient was monitored per post-cath protocol, and his R groin access site was c/d/i with no hematoma. Patient was able to ambulate without difficulty. He was feeling well and anticipating discharge home today.     Outpatient Plan:  - There were no medication changes    Diet: Cardiac diet    Activity: Ad tc    Disposition: Home or Self Care    Follow Up:      Discharge Medications:      Medication List      START taking these medications    nitroGLYCERIN 0.3 MG SL tablet  Commonly known as: NITROSTAT  Place 1 tablet (0.3 mg total) under the tongue every 5 (five) minutes as needed for Chest pain.        CONTINUE taking these medications    alcohol swabs Padm  Commonly known as: BD ALCOHOL SWABS  Apply 1 each topically as needed.     aspirin 81 MG EC tablet  Commonly known as: ECOTRIN  Take 1 tablet (81 mg total) by mouth once daily.     atorvastatin 80 MG tablet  Commonly known as: LIPITOR  Take 1 tablet (80 mg total) by mouth every evening.     * blood sugar diagnostic Strp  Commonly known as: ACCU-CHEK OLGA PLUS TEST STRP  1 each by Misc.(Non-Drug; Combo Route) route daily as needed.     * blood sugar diagnostic Strp  To check BG 2 times daily, to use with insurance preferred meter     BRILINTA 90 mg tablet  Generic drug: ticagrelor  Take 1 tablet (90 mg  total) by mouth 2 (two) times a day.     carvediloL 12.5 MG tablet  Commonly known as: COREG  Take 1 tablet (12.5 mg total) by mouth 2 (two) times daily with meals.     CENTRUM SILVER ORAL     furosemide 20 MG tablet  Commonly known as: LASIX  Take 1 tablet (20 mg total) by mouth 3 (three) times a week.     glipiZIDE 10 MG tablet  Commonly known as: GLUCOTROL  Take 1 tablet (10 mg total) by mouth 2 (two) times daily with meals.     hydrALAZINE 50 MG tablet  Commonly known as: APRESOLINE  Take 1 tablet (50 mg total) by mouth every 8 (eight) hours.     isosorbide dinitrate 20 MG tablet  Commonly known as: ISORDIL  Take 2 tablets (40 mg total) by mouth 3 (three) times daily.     lancets Misc  To check BG 2 times daily, to use with insurance preferred meter     metFORMIN 1000 MG tablet  Commonly known as: GLUCOPHAGE  Take 1 tablet (1,000 mg total) by mouth 2 (two) times daily with meals.     SITagliptin 100 MG Tab  Commonly known as: JANUVIA  Take 1 tablet (100 mg total) by mouth once daily.         * This list has 2 medication(s) that are the same as other medications prescribed for you. Read the directions carefully, and ask your doctor or other care provider to review them with you.            ASK your doctor about these medications    clopidogreL 75 mg tablet  Commonly known as: PLAVIX  Take 1 tablet (75 mg total) by mouth once daily.           Where to Get Your Medications      These medications were sent to [facility] [address] [address]    Hours: Mon-Fri 7a-7p, Sat-Sun 10a-4p Phone: [phone]   · BRILINTA 90 mg tablet  · isosorbide dinitrate 20 MG tablet  · nitroGLYCERIN 0.3 MG SL tablet         Elvis Maya  Interventional Cardiology Fellow PGY-7  06/28/2022

## 2022-06-30 LAB
LEFT EYE DM RETINOPATHY: NEGATIVE
RIGHT EYE DM RETINOPATHY: NEGATIVE

## 2022-07-04 PROBLEM — Z00.00 ENCOUNTER FOR PREVENTIVE HEALTH EXAMINATION: Status: RESOLVED | Noted: 2022-04-01 | Resolved: 2022-07-04

## 2022-07-06 ENCOUNTER — PATIENT OUTREACH (OUTPATIENT)
Dept: ADMINISTRATIVE | Facility: HOSPITAL | Age: 72
End: 2022-07-06
Payer: MEDICARE

## 2022-07-06 ENCOUNTER — OFFICE VISIT (OUTPATIENT)
Dept: FAMILY MEDICINE | Facility: CLINIC | Age: 72
End: 2022-07-06
Payer: MEDICARE

## 2022-07-06 ENCOUNTER — PATIENT MESSAGE (OUTPATIENT)
Dept: ADMINISTRATIVE | Facility: HOSPITAL | Age: 72
End: 2022-07-06
Payer: MEDICARE

## 2022-07-06 VITALS
TEMPERATURE: 98 F | OXYGEN SATURATION: 99 % | RESPIRATION RATE: 14 BRPM | WEIGHT: 199.06 LBS | BODY MASS INDEX: 29.48 KG/M2 | DIASTOLIC BLOOD PRESSURE: 58 MMHG | SYSTOLIC BLOOD PRESSURE: 150 MMHG | HEART RATE: 53 BPM | HEIGHT: 69 IN

## 2022-07-06 DIAGNOSIS — I15.2 HYPERTENSION ASSOCIATED WITH DIABETES: ICD-10-CM

## 2022-07-06 DIAGNOSIS — E11.65 TYPE 2 DIABETES MELLITUS WITH HYPERGLYCEMIA, WITHOUT LONG-TERM CURRENT USE OF INSULIN: ICD-10-CM

## 2022-07-06 DIAGNOSIS — N18.30 CKD STAGE 3 DUE TO TYPE 2 DIABETES MELLITUS: ICD-10-CM

## 2022-07-06 DIAGNOSIS — E11.69 HYPERLIPIDEMIA ASSOCIATED WITH TYPE 2 DIABETES MELLITUS: ICD-10-CM

## 2022-07-06 DIAGNOSIS — I25.10 CORONARY ARTERY DISEASE, UNSPECIFIED VESSEL OR LESION TYPE, UNSPECIFIED WHETHER ANGINA PRESENT, UNSPECIFIED WHETHER NATIVE OR TRANSPLANTED HEART: ICD-10-CM

## 2022-07-06 DIAGNOSIS — E11.59 HYPERTENSION ASSOCIATED WITH DIABETES: ICD-10-CM

## 2022-07-06 DIAGNOSIS — E11.22 CKD STAGE 3 DUE TO TYPE 2 DIABETES MELLITUS: ICD-10-CM

## 2022-07-06 DIAGNOSIS — Z09 HOSPITAL DISCHARGE FOLLOW-UP: Primary | ICD-10-CM

## 2022-07-06 DIAGNOSIS — E78.5 HYPERLIPIDEMIA ASSOCIATED WITH TYPE 2 DIABETES MELLITUS: ICD-10-CM

## 2022-07-06 PROCEDURE — 4010F ACE/ARB THERAPY RXD/TAKEN: CPT | Mod: CPTII,S$GLB,, | Performed by: STUDENT IN AN ORGANIZED HEALTH CARE EDUCATION/TRAINING PROGRAM

## 2022-07-06 PROCEDURE — 1111F PR DISCHARGE MEDS RECONCILED W/ CURRENT OUTPATIENT MED LIST: ICD-10-PCS | Mod: CPTII,S$GLB,, | Performed by: STUDENT IN AN ORGANIZED HEALTH CARE EDUCATION/TRAINING PROGRAM

## 2022-07-06 PROCEDURE — 1111F DSCHRG MED/CURRENT MED MERGE: CPT | Mod: CPTII,S$GLB,, | Performed by: STUDENT IN AN ORGANIZED HEALTH CARE EDUCATION/TRAINING PROGRAM

## 2022-07-06 PROCEDURE — 99999 PR PBB SHADOW E&M-EST. PATIENT-LVL IV: CPT | Mod: PBBFAC,,, | Performed by: STUDENT IN AN ORGANIZED HEALTH CARE EDUCATION/TRAINING PROGRAM

## 2022-07-06 PROCEDURE — 1101F PR PT FALLS ASSESS DOC 0-1 FALLS W/OUT INJ PAST YR: ICD-10-PCS | Mod: CPTII,S$GLB,, | Performed by: STUDENT IN AN ORGANIZED HEALTH CARE EDUCATION/TRAINING PROGRAM

## 2022-07-06 PROCEDURE — 99214 OFFICE O/P EST MOD 30 MIN: CPT | Mod: S$GLB,,, | Performed by: STUDENT IN AN ORGANIZED HEALTH CARE EDUCATION/TRAINING PROGRAM

## 2022-07-06 PROCEDURE — 1159F MED LIST DOCD IN RCRD: CPT | Mod: CPTII,S$GLB,, | Performed by: STUDENT IN AN ORGANIZED HEALTH CARE EDUCATION/TRAINING PROGRAM

## 2022-07-06 PROCEDURE — 3078F DIAST BP <80 MM HG: CPT | Mod: CPTII,S$GLB,, | Performed by: STUDENT IN AN ORGANIZED HEALTH CARE EDUCATION/TRAINING PROGRAM

## 2022-07-06 PROCEDURE — 3051F PR MOST RECENT HEMOGLOBIN A1C LEVEL 7.0 - < 8.0%: ICD-10-PCS | Mod: CPTII,S$GLB,, | Performed by: STUDENT IN AN ORGANIZED HEALTH CARE EDUCATION/TRAINING PROGRAM

## 2022-07-06 PROCEDURE — 99214 PR OFFICE/OUTPT VISIT, EST, LEVL IV, 30-39 MIN: ICD-10-PCS | Mod: S$GLB,,, | Performed by: STUDENT IN AN ORGANIZED HEALTH CARE EDUCATION/TRAINING PROGRAM

## 2022-07-06 PROCEDURE — 1159F PR MEDICATION LIST DOCUMENTED IN MEDICAL RECORD: ICD-10-PCS | Mod: CPTII,S$GLB,, | Performed by: STUDENT IN AN ORGANIZED HEALTH CARE EDUCATION/TRAINING PROGRAM

## 2022-07-06 PROCEDURE — 3077F SYST BP >= 140 MM HG: CPT | Mod: CPTII,S$GLB,, | Performed by: STUDENT IN AN ORGANIZED HEALTH CARE EDUCATION/TRAINING PROGRAM

## 2022-07-06 PROCEDURE — 3008F PR BODY MASS INDEX (BMI) DOCUMENTED: ICD-10-PCS | Mod: CPTII,S$GLB,, | Performed by: STUDENT IN AN ORGANIZED HEALTH CARE EDUCATION/TRAINING PROGRAM

## 2022-07-06 PROCEDURE — 3062F POS MACROALBUMINURIA REV: CPT | Mod: CPTII,S$GLB,, | Performed by: STUDENT IN AN ORGANIZED HEALTH CARE EDUCATION/TRAINING PROGRAM

## 2022-07-06 PROCEDURE — 3288F PR FALLS RISK ASSESSMENT DOCUMENTED: ICD-10-PCS | Mod: CPTII,S$GLB,, | Performed by: STUDENT IN AN ORGANIZED HEALTH CARE EDUCATION/TRAINING PROGRAM

## 2022-07-06 PROCEDURE — 1126F AMNT PAIN NOTED NONE PRSNT: CPT | Mod: CPTII,S$GLB,, | Performed by: STUDENT IN AN ORGANIZED HEALTH CARE EDUCATION/TRAINING PROGRAM

## 2022-07-06 PROCEDURE — 3288F FALL RISK ASSESSMENT DOCD: CPT | Mod: CPTII,S$GLB,, | Performed by: STUDENT IN AN ORGANIZED HEALTH CARE EDUCATION/TRAINING PROGRAM

## 2022-07-06 PROCEDURE — 3066F NEPHROPATHY DOC TX: CPT | Mod: CPTII,S$GLB,, | Performed by: STUDENT IN AN ORGANIZED HEALTH CARE EDUCATION/TRAINING PROGRAM

## 2022-07-06 PROCEDURE — 1101F PT FALLS ASSESS-DOCD LE1/YR: CPT | Mod: CPTII,S$GLB,, | Performed by: STUDENT IN AN ORGANIZED HEALTH CARE EDUCATION/TRAINING PROGRAM

## 2022-07-06 PROCEDURE — 3066F PR DOCUMENTATION OF TREATMENT FOR NEPHROPATHY: ICD-10-PCS | Mod: CPTII,S$GLB,, | Performed by: STUDENT IN AN ORGANIZED HEALTH CARE EDUCATION/TRAINING PROGRAM

## 2022-07-06 PROCEDURE — 99999 PR PBB SHADOW E&M-EST. PATIENT-LVL IV: ICD-10-PCS | Mod: PBBFAC,,, | Performed by: STUDENT IN AN ORGANIZED HEALTH CARE EDUCATION/TRAINING PROGRAM

## 2022-07-06 PROCEDURE — 4010F PR ACE/ARB THEARPY RXD/TAKEN: ICD-10-PCS | Mod: CPTII,S$GLB,, | Performed by: STUDENT IN AN ORGANIZED HEALTH CARE EDUCATION/TRAINING PROGRAM

## 2022-07-06 PROCEDURE — 3077F PR MOST RECENT SYSTOLIC BLOOD PRESSURE >= 140 MM HG: ICD-10-PCS | Mod: CPTII,S$GLB,, | Performed by: STUDENT IN AN ORGANIZED HEALTH CARE EDUCATION/TRAINING PROGRAM

## 2022-07-06 PROCEDURE — 3062F PR POS MACROALBUMINURIA RESULT DOCUMENTED/REVIEW: ICD-10-PCS | Mod: CPTII,S$GLB,, | Performed by: STUDENT IN AN ORGANIZED HEALTH CARE EDUCATION/TRAINING PROGRAM

## 2022-07-06 PROCEDURE — 1126F PR PAIN SEVERITY QUANTIFIED, NO PAIN PRESENT: ICD-10-PCS | Mod: CPTII,S$GLB,, | Performed by: STUDENT IN AN ORGANIZED HEALTH CARE EDUCATION/TRAINING PROGRAM

## 2022-07-06 PROCEDURE — 3078F PR MOST RECENT DIASTOLIC BLOOD PRESSURE < 80 MM HG: ICD-10-PCS | Mod: CPTII,S$GLB,, | Performed by: STUDENT IN AN ORGANIZED HEALTH CARE EDUCATION/TRAINING PROGRAM

## 2022-07-06 PROCEDURE — 3008F BODY MASS INDEX DOCD: CPT | Mod: CPTII,S$GLB,, | Performed by: STUDENT IN AN ORGANIZED HEALTH CARE EDUCATION/TRAINING PROGRAM

## 2022-07-06 PROCEDURE — 3051F HG A1C>EQUAL 7.0%<8.0%: CPT | Mod: CPTII,S$GLB,, | Performed by: STUDENT IN AN ORGANIZED HEALTH CARE EDUCATION/TRAINING PROGRAM

## 2022-07-06 RX ORDER — ISOSORBIDE DINITRATE 20 MG/1
40 TABLET ORAL 3 TIMES DAILY
Qty: 180 TABLET | Refills: 1 | Status: SHIPPED | OUTPATIENT
Start: 2022-07-06 | End: 2022-08-30 | Stop reason: SDUPTHER

## 2022-07-06 NOTE — PROGRESS NOTES
External diabetic eye exam record hyperlinked in Health Maintenance.    Care team updated.     Patient outreach regarding colon cancer screen.

## 2022-07-06 NOTE — PROGRESS NOTES
[facility] Note    Subjective:    The HPI and pertinent ROS is included in the Diagnostic Impression Remarks section at the end of the note. Please see below for further details. Chief complaint is at end of note.     Medication List with Changes/Refills   Current Medications    ALCOHOL SWABS (BD ALCOHOL SWABS) PADM    Apply 1 each topically as needed.    ASPIRIN (ECOTRIN) 81 MG EC TABLET    Take 1 tablet (81 mg total) by mouth once daily.    ATORVASTATIN (LIPITOR) 80 MG TABLET    Take 1 tablet (80 mg total) by mouth every evening.    BLOOD SUGAR DIAGNOSTIC (ACCU-CHEK OLGA PLUS TEST STRP) STRP    1 each by Misc.(Non-Drug; Combo Route) route daily as needed.    BLOOD SUGAR DIAGNOSTIC STRP    To check BG 2 times daily, to use with insurance preferred meter    CARVEDILOL (COREG) 12.5 MG TABLET    Take 1 tablet (12.5 mg total) by mouth 2 (two) times daily with meals.    FUROSEMIDE (LASIX) 20 MG TABLET    Take 1 tablet (20 mg total) by mouth 3 (three) times a week.    GLIPIZIDE (GLUCOTROL) 10 MG TABLET    Take 1 tablet (10 mg total) by mouth 2 (two) times daily with meals.    HYDRALAZINE (APRESOLINE) 50 MG TABLET    Take 1 tablet (50 mg total) by mouth every 8 (eight) hours.    LANCETS MISC    To check BG 2 times daily, to use with insurance preferred meter    MULTIVITAMIN W-MINERALS/LUTEIN (CENTRUM SILVER ORAL)    Take 1 capsule by mouth once daily.    NITROGLYCERIN (NITROSTAT) 0.3 MG SL TABLET    Place 1 tablet (0.3 mg total) under the tongue every 5 (five) minutes as needed for Chest pain.    SITAGLIPTIN (JANUVIA) 100 MG TAB    Take 1 tablet (100 mg total) by mouth once daily.    TICAGRELOR (BRILINTA) 90 MG TABLET    Take 1 tablet (90 mg total) by mouth 2 (two) times a day.   Changed and/or Refilled Medications    Modified Medication Previous Medication    ISOSORBIDE DINITRATE (ISORDIL) 20 MG TABLET isosorbide dinitrate (ISORDIL) 20 MG tablet       Take 2 tablets (40 mg total) by mouth 3 (three)  times daily.    Take 2 tablets (40 mg total) by mouth 3 (three) times daily.   Discontinued Medications    METFORMIN (GLUCOPHAGE) 1000 MG TABLET    Take 1 tablet (1,000 mg total) by mouth 2 (two) times daily with meals.     Modified Medications    Modified Medication Previous Medication    ISOSORBIDE DINITRATE (ISORDIL) 20 MG TABLET isosorbide dinitrate (ISORDIL) 20 MG tablet       Take 2 tablets (40 mg total) by mouth 3 (three) times daily.    Take 2 tablets (40 mg total) by mouth 3 (three) times daily.       Data reviewed 274}  Previous medical records reviewed and summarized in plan section at end of note.      If you are due for any health screening(s) below please notify me so we can arrange them to be ordered and scheduled to maintain your health.     Health Maintenance Due   Topic Date Due    COVID-19 Vaccine (1) Never done    TETANUS VACCINE  Never done    Shingles Vaccine (1 of 2) Never done    Colorectal Cancer Screening  Never done       The following portions of the patient's history were reviewed and updated as appropriate: allergies, current medications, past family history, past medical history, past social history, past surgical history and problem list.    He  has a past medical history of Anticoagulant long-term use, Cancer, Coronary artery disease, Diabetes mellitus type II, Hypertension, and Renal disorder.  He  has a past surgical history that includes stents; Appendectomy; Hernia repair; ANGIOGRAM, CORONARY, WITH LEFT HEART CATHETERIZATION (N/A, 11/27/2021); Coronary angiography (Left, 11/30/2021); ANGIOGRAM, CORONARY, WITH LEFT HEART CATHETERIZATION (N/A, 04/18/2022); Left heart catheterization (Left, 04/20/2022); Percutaneous transluminal angioplasty (N/A, 04/20/2022); Fracture surgery; Skin biopsy; and Left heart catheterization (Left, 6/24/2022).    He  reports that he has never smoked. He has never used smokeless tobacco. He reports previous alcohol use. He reports that he does not use  "drugs.  He family history includes Diabetes in his mother.    Review of patient's allergies indicates:   Allergen Reactions    Morphine Nausea And Vomiting    Vicodin [hydrocodone-acetaminophen] Nausea And Vomiting       Tobacco Use: Low Risk     Smoking Tobacco Use: Never Smoker    Smokeless Tobacco Use: Never Used     Physical Examination  General appearance: alert, cooperative, no distress  Neck: no thyromegaly, no neck stiffness  Lungs: clear to auscultation, no wheezes, rales or rhonchi, symmetric air entry  Heart: normal rate, regular rhythm, normal S1, S2, no murmurs, rubs, clicks or gallops  Abdomen: soft, nontender, nondistended, no rigidity, rebound, or guarding.   Back: no point tenderness over spine  Extremities: peripheral pulses normal, no unilateral leg swelling or calf tenderness   Neurological:alert, oriented, normal speech, no new focal findings or movement disorder noted from baseline  Skin: senile purpura on extremities       Protective Sensation (w/ 10 gram monofilament):  Right: Intact  Left: Intact    Visual Inspection:  Normal -  Bilateral    Pedal Pulses:   Right: Present  Left: Present    Posterior tibialis:   Right:Present  Left: Present        BP Readings from Last 3 Encounters:   07/06/22 (!) 150/58   06/24/22 135/64   06/17/22 (!) 122/57     Wt Readings from Last 3 Encounters:   07/06/22 90.3 kg (199 lb 1.2 oz)   06/24/22 86.2 kg (190 lb)   06/17/22 86 kg (189 lb 9.5 oz)     BP (!) 150/58 (BP Location: Right arm, Patient Position: Sitting, BP Method: Large (Manual))   Pulse (!) 53   Temp 98.2 °F (36.8 °C) (Oral)   Resp 14   Ht 5' 9" (1.753 m)   Wt 90.3 kg (199 lb 1.2 oz)   SpO2 99%   BMI 29.40 kg/m²    274}  Laboratory: I have reviewed old labs below:    274}    Lab Results   Component Value Date    WBC 9.37 06/24/2022    HGB 11.2 (L) 06/24/2022    HCT 33.5 (L) 06/24/2022    MCV 92 06/24/2022     06/24/2022     06/24/2022    K 4.5 06/24/2022     06/24/2022 " "   CALCIUM 9.6 06/24/2022    PHOS 4.1 04/19/2022    CO2 19 (L) 06/24/2022     (H) 06/24/2022    BUN 50 (H) 06/24/2022    CREATININE 2.0 (H) 06/24/2022    ANIONGAP 11 06/24/2022    ESTGFRAFRICA 37.2 (A) 06/24/2022    EGFRNONAA 32.2 (A) 06/24/2022    PROT 7.0 06/15/2022    ALBUMIN 4.5 06/21/2022    BILITOT 0.5 06/21/2022    ALKPHOS 82 06/15/2022    ALT 48 (H) 06/21/2022    AST 35 06/21/2022    INR 1.4 06/16/2022    CHOL 118 04/01/2022    TRIG 74 04/01/2022    HDL 36 (L) 04/01/2022    LDLCALC 67 04/01/2022    TSH 2.280 11/26/2021    PSA 1.2 12/03/2013    HGBA1C 7.4 (H) 04/01/2022    MICROALBUR 3.1 01/26/2021     Lab reviewed by me: Particular labs of significance that I will monitor, workup, or treat to improve are mentioned below in diagnostic impression remarks.    Imaging/EKG: I have reviewed the pertinent results and my findings are noted in remarks.  274}    CC:   Chief Complaint   Patient presents with    Hospital Follow Up           274}    Assessment/Plan  Amadeo Levin is a 71 y.o. male who presents to clinic with:  1. Hospital discharge follow-up    2. Type 2 diabetes mellitus with hyperglycemia, without long-term current use of insulin    3. Hypertension associated with diabetes    4. Hyperlipidemia associated with type 2 diabetes mellitus    5. CKD stage 3 due to type 2 diabetes mellitus    6. Coronary artery disease, unspecified vessel or lesion type, unspecified whether angina present, unspecified whether native or transplanted heart       274}  Diagnostic Impression Remarks + HPI     Documentation entered by me for this encounter may have been done in part using speech-recognition technology. Although I have made an effort to ensure accuracy, "sound like" errors may exist and should be interpreted in context.      Hospital [id number] well no chest pain or shortness of breath feels great has changes diet with low-salt very healthy patient is active continue current meds refill nitrate.  " Will check electrolytes since he has been started on Lasix   Diabetes-stable has improved his diet dramatically and he stop metformin while in hospital feels much better no diarrhea or side effects with decreased kidney function will hold on this continue other medications and diet will monitor A1c closely   Hypertension-control uncertain reports lower at home and 130 systolic will log blood pressure sent to me monitor no headache or chest pain will continue current meds check electrolytes   Hyperlipidemia-stable continue statin monitor recommend healthy diet as he is doing   CKD-stable due to high potassium holding Ace or Arb is going to see Nephrology is having a very low potassium diet could consider talk with Nephrology about starting a very low-dose of 1 of these but will lead to specialist   CAD-stable continue brilenta statin no chest pain   Senile purpura-stable monitor blood counts    Health maintenance-patient reports he had a fit kit down [employer] and the result was normal does not want to get a colonoscopy will try to get records    This is the extent of the patient's concerns at this present time. He did not feel chest pain upon exertion, dyspnea, nausea, vomiting, diaphoresis, or syncope. No pleuritic chest pain, unilateral leg swelling, calf tenderness, or calf pain. Amadeo will return to clinic in a few months for further workup and reassessment or sooner as needed. He was instructed to call the clinic or go to the emergency department if his symptoms do not improve, worsens, or if new symptoms develop. As we discussed that symptoms could worsen over the next 24 hours he was advised that if any increased swelling, pain, or numbness arise to go immediately to the ED. Patient knows to call any time if an emergency arises. Shared decision making occurred and he verbalized understanding in agreement with this plan. I discussed imaging findings, diagnosis, possibilities, treatment options,  medications, risks, and benefits. He had many questions regarding the options and long-term effects. All questions were answered. He expressed understanding after counseling regarding the diagnosis and recommendations. He was capable and demonstrated competence with understanding of these options. Shared decision making was performed resulting in him choosing the current treatment plan. Patient handout was given with instructions and recommendations. Advised the patient that if they become pregnant to alert us immediately to assess for medication changes. I also discussed the importance of close follow up to discuss labs, change or modify his medications if needed, monitor side effects, and further evaluation of medical problems.     Additional workup planned: see labs ordered below.    See below for labs and meds ordered with associated diagnosis      1. Hospital discharge follow-up    2. Type 2 diabetes mellitus with hyperglycemia, without long-term current use of insulin  - Diabetes Digital Medicine (DDMP) Enrollment Order  - Hemoglobin A1C; Future  - Hemoglobin A1C    3. Hypertension associated with diabetes  - isosorbide dinitrate (ISORDIL) 20 MG tablet; Take 2 tablets (40 mg total) by mouth 3 (three) times daily.  Dispense: 180 tablet; Refill: 1  - Hypertension Digital Medicine (HDMP) Enrollment Order  - Basic Metabolic Panel; Future  - Magnesium; Future  - Magnesium, RBC; Future  - Basic Metabolic Panel  - Magnesium  - Magnesium, RBC    4. Hyperlipidemia associated with type 2 diabetes mellitus    5. CKD stage 3 due to type 2 diabetes mellitus    6. Coronary artery disease, unspecified vessel or lesion type, unspecified whether angina present, unspecified whether native or transplanted heart  - isosorbide dinitrate (ISORDIL) 20 MG tablet; Take 2 tablets (40 mg total) by mouth 3 (three) times daily.  Dispense: 180 tablet; Refill: 1      Angelito Adams MD   274}  07/06/2022

## 2022-07-07 NOTE — Clinical Note
Is this patient a high probability for COVID-19?: No   Diagnosis: Congestive heart failure, unspecified HF chronicity, unspecified heart failure type [5569008]   Future Attending Provider: DARREN BEGUM [[id number]]   Admitting Provider:: DARREN BEGUM [[id number]]   Special Needs:: Fall Risk [15]  
Strong peripheral pulses/Capillary refill less/equal to 2 seconds

## 2022-07-08 LAB
BUN SERPL-MCNC: 32 MG/DL (ref 8–27)
BUN/CREAT SERPL: 18 (ref 10–24)
CALCIUM SERPL-MCNC: 8.8 MG/DL (ref 8.6–10.2)
CHLORIDE SERPL-SCNC: 110 MMOL/L (ref 96–106)
CO2 SERPL-SCNC: 21 MMOL/L (ref 20–29)
CREAT SERPL-MCNC: 1.78 MG/DL (ref 0.76–1.27)
EST. GFR  (NO RACE VARIABLE): 40 ML/MIN/1.73
GLUCOSE SERPL-MCNC: 155 MG/DL (ref 65–99)
HBA1C MFR BLD: 7.2 % (ref 4.8–5.6)
MAGNESIUM RBC-MCNC: 6.1 MG/DL (ref 4.2–6.8)
MAGNESIUM SERPL-MCNC: 2.4 MG/DL (ref 1.6–2.3)
POTASSIUM SERPL-SCNC: 4.7 MMOL/L (ref 3.5–5.2)
SODIUM SERPL-SCNC: 142 MMOL/L (ref 134–144)

## 2022-07-11 ENCOUNTER — TELEPHONE (OUTPATIENT)
Dept: NEPHROLOGY | Facility: CLINIC | Age: 72
End: 2022-07-11
Payer: MEDICARE

## 2022-07-11 NOTE — TELEPHONE ENCOUNTER
----- Message from Yanique Toledo, Patient Care Assistant sent at 7/11/2022  8:59 AM CDT -----  Regarding: appointment  Contact: pt  Type:  Sooner Appointment Request    Caller is requesting a sooner appointment.  Caller declined first available appointment listed below.  Caller will not accept being placed on the waitlist and is requesting a message be sent to doctor.    Name of Caller:  pt   When is the first available appointment?  2022  Symptoms:  new pt est care   Best Call Back Number:  [phone] (home)     Additional Information:  please call pt to advise. Thanks!

## 2022-07-13 ENCOUNTER — CLINICAL SUPPORT (OUTPATIENT)
Dept: FAMILY MEDICINE | Facility: CLINIC | Age: 72
End: 2022-07-13
Payer: MEDICARE

## 2022-07-13 ENCOUNTER — TELEPHONE (OUTPATIENT)
Dept: FAMILY MEDICINE | Facility: CLINIC | Age: 72
End: 2022-07-13

## 2022-07-13 ENCOUNTER — PATIENT MESSAGE (OUTPATIENT)
Dept: NEPHROLOGY | Facility: CLINIC | Age: 72
End: 2022-07-13
Payer: MEDICARE

## 2022-07-13 VITALS — SYSTOLIC BLOOD PRESSURE: 118 MMHG | DIASTOLIC BLOOD PRESSURE: 56 MMHG

## 2022-07-13 DIAGNOSIS — Z01.30 BP CHECK: Primary | ICD-10-CM

## 2022-07-13 PROCEDURE — 99999 PR PBB SHADOW E&M-EST. PATIENT-LVL I: CPT | Mod: PBBFAC,,,

## 2022-07-13 PROCEDURE — 99999 PR PBB SHADOW E&M-EST. PATIENT-LVL I: ICD-10-PCS | Mod: PBBFAC,,,

## 2022-07-13 NOTE — PROGRESS NOTES
Amadeo Levin 71 y.o. male is here today for Blood Pressure check.   History of HTN yes.    Review of patient's allergies indicates:   Allergen Reactions    Morphine Nausea And Vomiting    Vicodin [hydrocodone-acetaminophen] Nausea And Vomiting     Creatinine   Date Value Ref Range Status   07/06/2022 1.78 (H) 0.76 - 1.27 mg/dL Final     Sodium   Date Value Ref Range Status   07/06/2022 142 134 - 144 mmol/L Final     Potassium   Date Value Ref Range Status   07/06/2022 4.7 3.5 - 5.2 mmol/L Final   ]  Patient verifies taking blood pressure medications on a regular basis at the same time of the day.     Current Outpatient Medications:     alcohol swabs (BD ALCOHOL SWABS) PadM, Apply 1 each topically as needed., Disp: 100 each, Rfl: 6    aspirin (ECOTRIN) 81 MG EC tablet, Take 1 tablet (81 mg total) by mouth once daily., Disp: 30 tablet, Rfl: 11    atorvastatin (LIPITOR) 80 MG tablet, Take 1 tablet (80 mg total) by mouth every evening., Disp: 90 tablet, Rfl: 3    blood sugar diagnostic (ACCU-CHEK OLGA PLUS TEST STRP) Strp, 1 each by Misc.(Non-Drug; Combo Route) route daily as needed., Disp: 100 each, Rfl: 6    blood sugar diagnostic Strp, To check BG 2 times daily, to use with insurance preferred meter, Disp: 200 strip, Rfl: 2    carvediloL (COREG) 12.5 MG tablet, Take 1 tablet (12.5 mg total) by mouth 2 (two) times daily with meals., Disp: 180 tablet, Rfl: 3    furosemide (LASIX) 20 MG tablet, Take 1 tablet (20 mg total) by mouth 3 (three) times a week., Disp: 60 tablet, Rfl: 0    glipiZIDE (GLUCOTROL) 10 MG tablet, Take 1 tablet (10 mg total) by mouth 2 (two) times daily with meals., Disp: 180 tablet, Rfl: 1    hydrALAZINE (APRESOLINE) 50 MG tablet, Take 1 tablet (50 mg total) by mouth every 8 (eight) hours., Disp: 120 tablet, Rfl: 0    isosorbide dinitrate (ISORDIL) 20 MG tablet, Take 2 tablets (40 mg total) by mouth 3 (three) times daily., Disp: 180 tablet, Rfl: 1    lancets Misc, To check BG 2 times  daily, to use with insurance preferred meter, Disp: 100 each, Rfl: 0    MULTIVITAMIN W-MINERALS/LUTEIN (CENTRUM SILVER ORAL), Take 1 capsule by mouth once daily., Disp: , Rfl:     nitroGLYCERIN (NITROSTAT) 0.3 MG SL tablet, Place 1 tablet (0.3 mg total) under the tongue every 5 (five) minutes as needed for Chest pain., Disp: 100 tablet, Rfl: 1    SITagliptin (JANUVIA) 100 MG Tab, Take 1 tablet (100 mg total) by mouth once daily., Disp: 30 tablet, Rfl: 11    ticagrelor (BRILINTA) 90 mg tablet, Take 1 tablet (90 mg total) by mouth 2 (two) times a day., Disp: 60 tablet, Rfl: 1     Does patient have record of home blood pressure readings yes. Readings have been averaging . Reports 120's-130's for systolic when he consciously tries to  Take 3-4 minutes to relax and 140's for systolic when he gets busy and rushes to sit down and take his BP Diastolic 50's-70's. Please see media for copy of latest   BP log, under today's date. (sort by date to locate log)  Last dose of blood pressure medication was taken at around 6:00 a.m. this morning.  Patient is asymptomatic.   [facility] [place] and is walking 1.22 miles at a somewhat brisk pace and is doing the UBE, upper arm cycle forward   for half a mile and backwards for half a mile. States he has lost a significant amount of weight and does try to be careful with his sodium consumption.    Initial BP today was 120/56  Blood pressure reading after 15 minutes was 118/58, Pulse:60  Pt. Not scheduled fo fuov until 2/3/2023 w/Dr. Adams so he  Agreed to return for 2 wk nurse visit on 7/27 and will have BP log  Which reflects his blood pressures only after resting 5-10 minutes  Dr. Adams notified.

## 2022-07-13 NOTE — TELEPHONE ENCOUNTER
Amadeo Levin 71 y.o. male is here today for Blood Pressure check.   History of HTN yes.    Review of patient's allergies indicates:   Allergen Reactions    Morphine Nausea And Vomiting    Vicodin [hydrocodone-acetaminophen] Nausea And Vomiting     Creatinine   Date Value Ref Range Status   07/06/2022 1.78 (H) 0.76 - 1.27 mg/dL Final     Sodium   Date Value Ref Range Status   07/06/2022 142 134 - 144 mmol/L Final     Potassium   Date Value Ref Range Status   07/06/2022 4.7 3.5 - 5.2 mmol/L Final   ]  Patient verifies taking blood pressure medications on a regular basis at the same time of the day.     Current Outpatient Medications:     alcohol swabs (BD ALCOHOL SWABS) PadM, Apply 1 each topically as needed., Disp: 100 each, Rfl: 6    aspirin (ECOTRIN) 81 MG EC tablet, Take 1 tablet (81 mg total) by mouth once daily., Disp: 30 tablet, Rfl: 11    atorvastatin (LIPITOR) 80 MG tablet, Take 1 tablet (80 mg total) by mouth every evening., Disp: 90 tablet, Rfl: 3    blood sugar diagnostic (ACCU-CHEK OLGA PLUS TEST STRP) Strp, 1 each by Misc.(Non-Drug; Combo Route) route daily as needed., Disp: 100 each, Rfl: 6    blood sugar diagnostic Strp, To check BG 2 times daily, to use with insurance preferred meter, Disp: 200 strip, Rfl: 2    carvediloL (COREG) 12.5 MG tablet, Take 1 tablet (12.5 mg total) by mouth 2 (two) times daily with meals., Disp: 180 tablet, Rfl: 3    furosemide (LASIX) 20 MG tablet, Take 1 tablet (20 mg total) by mouth 3 (three) times a week., Disp: 60 tablet, Rfl: 0    glipiZIDE (GLUCOTROL) 10 MG tablet, Take 1 tablet (10 mg total) by mouth 2 (two) times daily with meals., Disp: 180 tablet, Rfl: 1    hydrALAZINE (APRESOLINE) 50 MG tablet, Take 1 tablet (50 mg total) by mouth every 8 (eight) hours., Disp: 120 tablet, Rfl: 0    isosorbide dinitrate (ISORDIL) 20 MG tablet, Take 2 tablets (40 mg total) by mouth 3 (three) times daily., Disp: 180 tablet, Rfl: 1    lancets Misc, To check BG 2 times  daily, to use with insurance preferred meter, Disp: 100 each, Rfl: 0    MULTIVITAMIN W-MINERALS/LUTEIN (CENTRUM SILVER ORAL), Take 1 capsule by mouth once daily., Disp: , Rfl:     nitroGLYCERIN (NITROSTAT) 0.3 MG SL tablet, Place 1 tablet (0.3 mg total) under the tongue every 5 (five) minutes as needed for Chest pain., Disp: 100 tablet, Rfl: 1    SITagliptin (JANUVIA) 100 MG Tab, Take 1 tablet (100 mg total) by mouth once daily., Disp: 30 tablet, Rfl: 11    ticagrelor (BRILINTA) 90 mg tablet, Take 1 tablet (90 mg total) by mouth 2 (two) times a day., Disp: 60 tablet, Rfl: 1     Does patient have record of home blood pressure readings yes. Readings have been averaging . Reports 120's-130's for systolic when he consciously tries to  Take 3-4 minutes to relax and 140's for systolic when he gets busy and rushes to sit down and take his BP Diastolic 50's-70's. Please see media for copy of latest   BP log, under today's date. (sort by date to locate log)  Last dose of blood pressure medication was taken at around 6:00 a.m. this morning.  Patient is asymptomatic.   Pt reports [facility] is going [facility] he lives in [place] and is walking 1.22 miles at a somewhat brisk pace and is doing the UBE, upper arm cycle forward   for half a mile and backwards for half a mile. States he has lost a significant amount of weight and does try to be careful with his sodium consumption.  Pt. Declined HTN digital medicine at this time.  Initial BP today was 120/56  Blood pressure reading after 15 minutes was 118/58, Pulse:60  Pt. Not scheduled fo fuov until 2/3/2023 w/Dr. Adams so he agreed to return for 2 wk nurse visit on 7/27 and will have BP log, which reflects his blood pressures only after resting 5-10 minutes  Dr. Adams notified.

## 2022-07-27 ENCOUNTER — CLINICAL SUPPORT (OUTPATIENT)
Dept: FAMILY MEDICINE | Facility: CLINIC | Age: 72
End: 2022-07-27
Payer: MEDICARE

## 2022-07-27 ENCOUNTER — TELEPHONE (OUTPATIENT)
Dept: FAMILY MEDICINE | Facility: CLINIC | Age: 72
End: 2022-07-27

## 2022-07-27 VITALS — SYSTOLIC BLOOD PRESSURE: 134 MMHG | DIASTOLIC BLOOD PRESSURE: 66 MMHG | HEART RATE: 64 BPM

## 2022-07-27 DIAGNOSIS — Z01.30 BP CHECK: Primary | ICD-10-CM

## 2022-07-27 PROCEDURE — 99999 PR PBB SHADOW E&M-EST. PATIENT-LVL I: CPT | Mod: PBBFAC,,,

## 2022-07-27 PROCEDURE — 99999 PR PBB SHADOW E&M-EST. PATIENT-LVL I: ICD-10-PCS | Mod: PBBFAC,,,

## 2022-07-27 RX ORDER — HYDRALAZINE HYDROCHLORIDE 50 MG/1
50 TABLET, FILM COATED ORAL EVERY 8 HOURS
Qty: 120 TABLET | Refills: 0 | Status: SHIPPED | OUTPATIENT
Start: 2022-07-27 | End: 2022-08-30 | Stop reason: SDUPTHER

## 2022-07-27 NOTE — TELEPHONE ENCOUNTER
Amadeo Levin 71 y.o. male is here today for Blood Pressure check.   History of HTN yes.    Review of patient's allergies indicates:   Allergen Reactions    Morphine Nausea And Vomiting    Vicodin [hydrocodone-acetaminophen] Nausea And Vomiting     Creatinine   Date Value Ref Range Status   07/06/2022 1.78 (H) 0.76 - 1.27 mg/dL Final     Sodium   Date Value Ref Range Status   07/06/2022 142 134 - 144 mmol/L Final     Potassium   Date Value Ref Range Status   07/06/2022 4.7 3.5 - 5.2 mmol/L Final   ]  Patient verifies taking blood pressure medications on a regular basis at the same time of the day.     Current Outpatient Medications:     alcohol swabs (BD ALCOHOL SWABS) PadM, Apply 1 each topically as needed., Disp: 100 each, Rfl: 6    aspirin (ECOTRIN) 81 MG EC tablet, Take 1 tablet (81 mg total) by mouth once daily., Disp: 30 tablet, Rfl: 11    atorvastatin (LIPITOR) 80 MG tablet, Take 1 tablet (80 mg total) by mouth every evening., Disp: 90 tablet, Rfl: 3    blood sugar diagnostic (ACCU-CHEK OLGA PLUS TEST STRP) Strp, 1 each by Misc.(Non-Drug; Combo Route) route daily as needed., Disp: 100 each, Rfl: 6    blood sugar diagnostic Strp, To check BG 2 times daily, to use with insurance preferred meter, Disp: 200 strip, Rfl: 2    carvediloL (COREG) 12.5 MG tablet, Take 1 tablet (12.5 mg total) by mouth 2 (two) times daily with meals., Disp: 180 tablet, Rfl: 3    furosemide (LASIX) 20 MG tablet, Take 1 tablet (20 mg total) by mouth 3 (three) times a week., Disp: 60 tablet, Rfl: 0    glipiZIDE (GLUCOTROL) 10 MG tablet, Take 1 tablet (10 mg total) by mouth 2 (two) times daily with meals., Disp: 180 tablet, Rfl: 1    hydrALAZINE (APRESOLINE) 50 MG tablet, Take 1 tablet (50 mg total) by mouth every 8 (eight) hours., Disp: 120 tablet, Rfl: 0    isosorbide dinitrate (ISORDIL) 20 MG tablet, Take 2 tablets (40 mg total) by mouth 3 (three) times daily., Disp: 180 tablet, Rfl: 1    lancets Misc, To check BG 2 times  daily, to use with insurance preferred meter, Disp: 100 each, Rfl: 0    MULTIVITAMIN W-MINERALS/LUTEIN (CENTRUM SILVER ORAL), Take 1 capsule by mouth once daily., Disp: , Rfl:     nitroGLYCERIN (NITROSTAT) 0.3 MG SL tablet, Place 1 tablet (0.3 mg total) under the tongue every 5 (five) minutes as needed for Chest pain., Disp: 100 tablet, Rfl: 1    SITagliptin (JANUVIA) 100 MG Tab, Take 1 tablet (100 mg total) by mouth once daily., Disp: 30 tablet, Rfl: 11    ticagrelor (BRILINTA) 90 mg tablet, Take 1 tablet (90 mg total) by mouth 2 (two) times a day., Disp: 60 tablet, Rfl: 1     Does patient have record of home blood pressure readings yes. Readings have been averaging 120's-130's/50's-60's (copy is on your desk)  Last dose of blood pressure medication was taken at  6:00 a.m. this morning  Patient is asymptomatic.     Initial BP today was 136/68.   Blood pressure reading after 15 minutes was 134/66, Pulse:64  Annual sched for 2/3/2023 with Dr. Bryan Adams notified.

## 2022-07-27 NOTE — PROGRESS NOTES
Amadeo Levin 71 y.o. male is here today for Blood Pressure check.   History of HTN yes.    Review of patient's allergies indicates:   Allergen Reactions    Morphine Nausea And Vomiting    Vicodin [hydrocodone-acetaminophen] Nausea And Vomiting     Creatinine   Date Value Ref Range Status   07/06/2022 1.78 (H) 0.76 - 1.27 mg/dL Final     Sodium   Date Value Ref Range Status   07/06/2022 142 134 - 144 mmol/L Final     Potassium   Date Value Ref Range Status   07/06/2022 4.7 3.5 - 5.2 mmol/L Final   ]  Patient verifies taking blood pressure medications on a regular basis at the same time of the day.     Current Outpatient Medications:     alcohol swabs (BD ALCOHOL SWABS) PadM, Apply 1 each topically as needed., Disp: 100 each, Rfl: 6    aspirin (ECOTRIN) 81 MG EC tablet, Take 1 tablet (81 mg total) by mouth once daily., Disp: 30 tablet, Rfl: 11    atorvastatin (LIPITOR) 80 MG tablet, Take 1 tablet (80 mg total) by mouth every evening., Disp: 90 tablet, Rfl: 3    blood sugar diagnostic (ACCU-CHEK OLGA PLUS TEST STRP) Strp, 1 each by Misc.(Non-Drug; Combo Route) route daily as needed., Disp: 100 each, Rfl: 6    blood sugar diagnostic Strp, To check BG 2 times daily, to use with insurance preferred meter, Disp: 200 strip, Rfl: 2    carvediloL (COREG) 12.5 MG tablet, Take 1 tablet (12.5 mg total) by mouth 2 (two) times daily with meals., Disp: 180 tablet, Rfl: 3    furosemide (LASIX) 20 MG tablet, Take 1 tablet (20 mg total) by mouth 3 (three) times a week., Disp: 60 tablet, Rfl: 0    glipiZIDE (GLUCOTROL) 10 MG tablet, Take 1 tablet (10 mg total) by mouth 2 (two) times daily with meals., Disp: 180 tablet, Rfl: 1    hydrALAZINE (APRESOLINE) 50 MG tablet, Take 1 tablet (50 mg total) by mouth every 8 (eight) hours., Disp: 120 tablet, Rfl: 0    isosorbide dinitrate (ISORDIL) 20 MG tablet, Take 2 tablets (40 mg total) by mouth 3 (three) times daily., Disp: 180 tablet, Rfl: 1    lancets Misc, To check BG 2 times  daily, to use with insurance preferred meter, Disp: 100 each, Rfl: 0    MULTIVITAMIN W-MINERALS/LUTEIN (CENTRUM SILVER ORAL), Take 1 capsule by mouth once daily., Disp: , Rfl:     nitroGLYCERIN (NITROSTAT) 0.3 MG SL tablet, Place 1 tablet (0.3 mg total) under the tongue every 5 (five) minutes as needed for Chest pain., Disp: 100 tablet, Rfl: 1    SITagliptin (JANUVIA) 100 MG Tab, Take 1 tablet (100 mg total) by mouth once daily., Disp: 30 tablet, Rfl: 11    ticagrelor (BRILINTA) 90 mg tablet, Take 1 tablet (90 mg total) by mouth 2 (two) times a day., Disp: 60 tablet, Rfl: 1     Does patient have record of home blood pressure readings yes. Readings have been averaging 120's-130's/50's-60's , and copy of log in media with today's date.  Last dose of blood pressure medication was taken at  6:00 a.m. this morning  Patient is asymptomatic.     Initial BP today was 136/68.   Blood pressure reading after 15 minutes was 134/66, Pulse:64  Annual sched for 2/3/2023 with Dr. Bryan Adams notified.

## 2022-07-28 ENCOUNTER — PATIENT MESSAGE (OUTPATIENT)
Dept: FAMILY MEDICINE | Facility: CLINIC | Age: 72
End: 2022-07-28
Payer: MEDICARE

## 2022-07-28 ENCOUNTER — TELEPHONE (OUTPATIENT)
Dept: FAMILY MEDICINE | Facility: CLINIC | Age: 72
End: 2022-07-28
Payer: MEDICARE

## 2022-08-03 DIAGNOSIS — E11.65 TYPE 2 DIABETES MELLITUS WITH HYPERGLYCEMIA, WITHOUT LONG-TERM CURRENT USE OF INSULIN: Primary | ICD-10-CM

## 2022-08-03 RX ORDER — LANCETS
1 EACH MISCELLANEOUS 2 TIMES DAILY
Qty: 200 EACH | Refills: 3 | Status: SHIPPED | OUTPATIENT
Start: 2022-08-03

## 2022-08-03 RX ORDER — GLIPIZIDE 10 MG/1
10 TABLET ORAL 2 TIMES DAILY WITH MEALS
Qty: 180 TABLET | Refills: 3 | Status: SHIPPED | OUTPATIENT
Start: 2022-08-03

## 2022-08-03 RX ORDER — METFORMIN HYDROCHLORIDE 500 MG/1
500 TABLET ORAL 2 TIMES DAILY WITH MEALS
Qty: 180 TABLET | Refills: 3 | Status: SHIPPED | OUTPATIENT
Start: 2022-08-03 | End: 2023-08-03

## 2022-08-03 NOTE — TELEPHONE ENCOUNTER
No new care gaps identified.  [facility] Embedded Care Gaps. Reference number: [id number]. 8/03/2022   11:29:24 AM CDT

## 2022-08-03 NOTE — TELEPHONE ENCOUNTER
----- Message from Penelope Mooney MA sent at 8/3/2022  9:25 AM CDT -----  Spoke to pt  Needs test strips and lancets for ACCUCHEK MACHINE sent to [facility], tests twice daily  Saw [facility] 7/27 for his b/p, discussed that he cut his metformin 1000mg in half twice daily and so far his sugars have been in the normal range and he is NOT having diarrhea. Would like to stay on 500mg twice daily but needs an rx sent to [facility] 90 day supply..Also on Januvia and glipizide.  Please call pt @ [phone]  Thanks !  Please do NOT respond directly back to me, any questions, reply to staff box since this inbox is not routinely monitored

## 2022-08-09 DIAGNOSIS — I25.10 CORONARY ARTERY DISEASE INVOLVING NATIVE CORONARY ARTERY OF NATIVE HEART WITHOUT ANGINA PECTORIS: Primary | ICD-10-CM

## 2022-08-09 RX ORDER — CLOPIDOGREL BISULFATE 75 MG/1
75 TABLET ORAL DAILY
Qty: 90 TABLET | Refills: 3 | Status: SHIPPED | OUTPATIENT
Start: 2022-08-09

## 2022-08-09 RX ORDER — CLOPIDOGREL BISULFATE 75 MG/1
75 TABLET ORAL DAILY
COMMUNITY
End: 2022-08-09 | Stop reason: SDUPTHER

## 2022-08-09 NOTE — TELEPHONE ENCOUNTER
Patient called stating that he cannot afford his brilinta. Spoke to Dr. Vernon. Changed to Plavix 75 mg daily. Instructed patient to take loading dose of 600mg then one pill daily. Patient verbalized understanding.

## 2022-08-18 DIAGNOSIS — E11.65 TYPE 2 DIABETES MELLITUS WITH HYPERGLYCEMIA, WITHOUT LONG-TERM CURRENT USE OF INSULIN: ICD-10-CM

## 2022-08-18 NOTE — TELEPHONE ENCOUNTER
----- Message from Penelope Mooney MA sent at 8/18/2022  9:55 AM CDT -----  Pt is still waiting on his Accuchek test strips , test twice daily, pharmacy says they never received the rx , but it was sent 8/3  Please call in again to  [facility] @ [phone]  And please call pt when called in  Thanks !  Please do NOT respond directly back to me, any questions, reply to staff box since this inbox is not routinely monitored

## 2022-08-18 NOTE — TELEPHONE ENCOUNTER
No new care gaps identified.  [facility] Embedded Care Gaps. Reference number: [id number]. 8/18/2022   10:55:06 AM LORIT

## 2022-08-23 ENCOUNTER — TELEPHONE (OUTPATIENT)
Dept: FAMILY MEDICINE | Facility: CLINIC | Age: 72
End: 2022-08-23
Payer: MEDICARE

## 2022-08-23 NOTE — TELEPHONE ENCOUNTER
----- Message from Yanique Toledo, Patient Care Assistant sent at 8/23/2022  8:16 AM CDT -----  Regarding: returning call  Contact: pt  Type:  Patient Returning Call    Who Called:  pt   Who Left Message for Patient:  not sure   Does the patient know what this is regarding?:  not sure   Best Call Back Number:  [phone] (home)     Additional Information:  please call pt to advise. Thanks!

## 2022-08-30 DIAGNOSIS — I25.10 CORONARY ARTERY DISEASE, UNSPECIFIED VESSEL OR LESION TYPE, UNSPECIFIED WHETHER ANGINA PRESENT, UNSPECIFIED WHETHER NATIVE OR TRANSPLANTED HEART: ICD-10-CM

## 2022-08-30 DIAGNOSIS — E11.59 HYPERTENSION ASSOCIATED WITH DIABETES: ICD-10-CM

## 2022-08-30 DIAGNOSIS — I15.2 HYPERTENSION ASSOCIATED WITH DIABETES: ICD-10-CM

## 2022-08-30 RX ORDER — ISOSORBIDE DINITRATE 20 MG/1
40 TABLET ORAL 3 TIMES DAILY
Qty: 540 TABLET | Refills: 3 | Status: SHIPPED | OUTPATIENT
Start: 2022-08-30 | End: 2023-08-30

## 2022-08-30 RX ORDER — HYDRALAZINE HYDROCHLORIDE 50 MG/1
50 TABLET, FILM COATED ORAL EVERY 8 HOURS
Qty: 270 TABLET | Refills: 3 | Status: SHIPPED | OUTPATIENT
Start: 2022-08-30 | End: 2023-08-30

## 2022-08-30 NOTE — TELEPHONE ENCOUNTER
No new care gaps identified.  [facility] Embedded Care Gaps. Reference number: [id number]. 8/30/2022   9:34:11 AM LORIT

## 2022-10-05 ENCOUNTER — TELEPHONE (OUTPATIENT)
Dept: ADMINISTRATIVE | Facility: CLINIC | Age: 72
End: 2022-10-05
Payer: MEDICARE

## 2022-10-06 ENCOUNTER — PATIENT MESSAGE (OUTPATIENT)
Dept: FAMILY MEDICINE | Facility: CLINIC | Age: 72
End: 2022-10-06
Payer: MEDICARE

## 2023-04-10 NOTE — TELEPHONE ENCOUNTER
Spoke with pt to let him know that he needs to bring in his bottles for a nurse visit.    Klisyri Counseling:  I discussed with the patient the risks of Klisyri including but not limited to erythema, scaling, itching, weeping, crusting, and pain.

## (undated) DEVICE — KIT PROBE COVER WITH GEL

## (undated) DEVICE — PAD DEFIB CADENCE ADULT R2

## (undated) DEVICE — GUIDEWIRE EMERALD 150CM PTFE

## (undated) DEVICE — WIRE WHISPER MS 014 X 190

## (undated) DEVICE — CATHETER RADIAL TIG 4.0 OPTITORQUE 5X110

## (undated) DEVICE — PROTECTION STATION PLUS

## (undated) DEVICE — CATH BLLN FG APEX MR 3.00X15MM

## (undated) DEVICE — SPIKE CONTRAST CONTROLLER

## (undated) DEVICE — CATH BLLN FG APEX MR 2.50X12MM

## (undated) DEVICE — CATH B-RAIL NOVOSTE BETA 3.5F

## (undated) DEVICE — MICROCATHETER MAMBA FLEX 135CM

## (undated) DEVICE — CATH BLLN FG APEX MR 3.00X20MM

## (undated) DEVICE — OMNIPAQUE 350 200ML

## (undated) DEVICE — CATH MINI TREK 1.20MMX15MM

## (undated) DEVICE — SHEATH INTRODUCER 8FR 11CM

## (undated) DEVICE — KIT CO-PILOT

## (undated) DEVICE — KIT INFLATION MERIT (IN8152, HP9200E)

## (undated) DEVICE — CATH EAGLE EYE PLATINUM

## (undated) DEVICE — KIT CUSTOM MANIFOLD

## (undated) DEVICE — SEE MEDLINE ITEM 156894

## (undated) DEVICE — BOWL STERILE LARGE 32OZ

## (undated) DEVICE — GUIDEWIRE SION BLUE STR 190CM

## (undated) DEVICE — CATH BLLN FG APEX MR 2.50X15MM

## (undated) DEVICE — INFLATOR ENCORE 26 BLLN INFL

## (undated) DEVICE — GUIDEWIRE J TIP EXCHANGE FIXED CORE 260

## (undated) DEVICE — GUIDEWIRE RUNTHROUGH 180CM

## (undated) DEVICE — CATH BLLN FG APEX MR 2.75X20MM

## (undated) DEVICE — GUIDE WIRE BMW 014 X190

## (undated) DEVICE — HEMOSTAT VASC BAND REG 24CM

## (undated) DEVICE — CATHETER GUIDE LAUNCHER AL 1 6X110

## (undated) DEVICE — GUIDEWIRE SUPRA CORE 035 190CM

## (undated) DEVICE — CATH NC QUANTUM APEX MR 2.5X12

## (undated) DEVICE — SHEATH INTRODUCER 6FR 11CM

## (undated) DEVICE — SHEATH INTRODUCER SLENDER 6FX10CM

## (undated) DEVICE — GUIDEWIRE PTCA FIELDER XT STR TIP 190CM

## (undated) DEVICE — GUIDEWIRE DOUBLE ENDED .035 DIA. 150CML

## (undated) DEVICE — Device

## (undated) DEVICE — MAMBA FLEX 135CM

## (undated) DEVICE — HEMOSTAT VASC BAND LONG 27CM

## (undated) DEVICE — CATHETER GUIDE LAUNCHER JR4 6FX110

## (undated) DEVICE — CATHETER BALLOON EUPHORA 2.50X15MM

## (undated) DEVICE — KIT MICROINTRODUCE MINI 5X10CM

## (undated) DEVICE — GUIDEWIRE RUNTHROUGH 180 HYPERCOAT

## (undated) DEVICE — KIT MICROINTRO 4F .018X40X7CM

## (undated) DEVICE — SHEATH PINNACLE 6FRX10CM W/GUIDEWIRE

## (undated) DEVICE — DEVICE PERCLOSE SUT CLSR 6FR

## (undated) DEVICE — GUIDE LAUNCHER 8F JR 4.0

## (undated) DEVICE — GUIDEWIRE FIELDER XT.014X190CM

## (undated) DEVICE — BLLN SAPPHIRE SC 2.5 X 15

## (undated) DEVICE — CATH NC QUANTUM APEX MR 4X20

## (undated) DEVICE — VALVE BLEEDBACK CONTROL 1003330

## (undated) DEVICE — CATH BLLN FG APEX MR 3.50X15MM

## (undated) DEVICE — CATHETER DIAGNOSTIC DXTERITY 5F PIGST

## (undated) DEVICE — CATH TELESCOPE GUIDE EXT 6FR